# Patient Record
Sex: MALE | Race: WHITE | NOT HISPANIC OR LATINO
[De-identification: names, ages, dates, MRNs, and addresses within clinical notes are randomized per-mention and may not be internally consistent; named-entity substitution may affect disease eponyms.]

---

## 2017-01-26 ENCOUNTER — APPOINTMENT (OUTPATIENT)
Dept: ELECTROPHYSIOLOGY | Facility: CLINIC | Age: 82
End: 2017-01-26

## 2017-02-22 ENCOUNTER — APPOINTMENT (OUTPATIENT)
Dept: ELECTROPHYSIOLOGY | Facility: HOSPITAL | Age: 82
End: 2017-02-22

## 2017-02-22 DIAGNOSIS — I49.5 SICK SINUS SYNDROME: ICD-10-CM

## 2017-09-05 ENCOUNTER — INPATIENT (INPATIENT)
Facility: HOSPITAL | Age: 82
LOS: 14 days | Discharge: OTHER INSTITUTION | DRG: 199 | End: 2017-09-20
Attending: SURGERY | Admitting: SURGERY
Payer: MEDICARE

## 2017-09-05 VITALS
DIASTOLIC BLOOD PRESSURE: 68 MMHG | HEART RATE: 58 BPM | SYSTOLIC BLOOD PRESSURE: 133 MMHG | RESPIRATION RATE: 20 BRPM | WEIGHT: 169.98 LBS | TEMPERATURE: 99 F | OXYGEN SATURATION: 95 %

## 2017-09-05 DIAGNOSIS — J93.9 PNEUMOTHORAX, UNSPECIFIED: ICD-10-CM

## 2017-09-05 LAB
ALBUMIN SERPL ELPH-MCNC: 4.2 G/DL — SIGNIFICANT CHANGE UP (ref 3.3–5)
ALP SERPL-CCNC: 132 U/L — HIGH (ref 40–120)
ALT FLD-CCNC: 28 U/L RC — SIGNIFICANT CHANGE UP (ref 10–45)
ANION GAP SERPL CALC-SCNC: 13 MMOL/L — SIGNIFICANT CHANGE UP (ref 5–17)
APTT BLD: 32.1 SEC — SIGNIFICANT CHANGE UP (ref 27.5–37.4)
AST SERPL-CCNC: 39 U/L — SIGNIFICANT CHANGE UP (ref 10–40)
BASE EXCESS BLDV CALC-SCNC: 2.3 MMOL/L — HIGH (ref -2–2)
BASOPHILS # BLD AUTO: 0 K/UL — SIGNIFICANT CHANGE UP (ref 0–0.2)
BASOPHILS NFR BLD AUTO: 0.1 % — SIGNIFICANT CHANGE UP (ref 0–2)
BILIRUB SERPL-MCNC: 0.6 MG/DL — SIGNIFICANT CHANGE UP (ref 0.2–1.2)
BLD GP AB SCN SERPL QL: NEGATIVE — SIGNIFICANT CHANGE UP
BUN SERPL-MCNC: 23 MG/DL — SIGNIFICANT CHANGE UP (ref 7–23)
CA-I SERPL-SCNC: 1.29 MMOL/L — SIGNIFICANT CHANGE UP (ref 1.12–1.3)
CALCIUM SERPL-MCNC: 10.4 MG/DL — SIGNIFICANT CHANGE UP (ref 8.4–10.5)
CHLORIDE BLDV-SCNC: 105 MMOL/L — SIGNIFICANT CHANGE UP (ref 96–108)
CHLORIDE SERPL-SCNC: 100 MMOL/L — SIGNIFICANT CHANGE UP (ref 96–108)
CO2 BLDV-SCNC: 30 MMOL/L — SIGNIFICANT CHANGE UP (ref 22–30)
CO2 SERPL-SCNC: 27 MMOL/L — SIGNIFICANT CHANGE UP (ref 22–31)
CREAT SERPL-MCNC: 1.17 MG/DL — SIGNIFICANT CHANGE UP (ref 0.5–1.3)
EOSINOPHIL # BLD AUTO: 0.1 K/UL — SIGNIFICANT CHANGE UP (ref 0–0.5)
EOSINOPHIL NFR BLD AUTO: 1.3 % — SIGNIFICANT CHANGE UP (ref 0–6)
GAS PNL BLDV: 136 MMOL/L — SIGNIFICANT CHANGE UP (ref 136–145)
GAS PNL BLDV: SIGNIFICANT CHANGE UP
GLUCOSE BLDV-MCNC: 107 MG/DL — HIGH (ref 70–99)
GLUCOSE SERPL-MCNC: 109 MG/DL — HIGH (ref 70–99)
HCO3 BLDV-SCNC: 29 MMOL/L — SIGNIFICANT CHANGE UP (ref 21–29)
HCT VFR BLD CALC: 42.5 % — SIGNIFICANT CHANGE UP (ref 39–50)
HCT VFR BLDA CALC: 43 % — SIGNIFICANT CHANGE UP (ref 39–50)
HGB BLD CALC-MCNC: 14 G/DL — SIGNIFICANT CHANGE UP (ref 13–17)
HGB BLD-MCNC: 14 G/DL — SIGNIFICANT CHANGE UP (ref 13–17)
INR BLD: 1.04 RATIO — SIGNIFICANT CHANGE UP (ref 0.88–1.16)
LACTATE BLDV-MCNC: 2.6 MMOL/L — HIGH (ref 0.7–2)
LYMPHOCYTES # BLD AUTO: 1.6 K/UL — SIGNIFICANT CHANGE UP (ref 1–3.3)
LYMPHOCYTES # BLD AUTO: 14.7 % — SIGNIFICANT CHANGE UP (ref 13–44)
MCHC RBC-ENTMCNC: 32.8 GM/DL — SIGNIFICANT CHANGE UP (ref 32–36)
MCHC RBC-ENTMCNC: 33.1 PG — SIGNIFICANT CHANGE UP (ref 27–34)
MCV RBC AUTO: 101 FL — HIGH (ref 80–100)
MONOCYTES # BLD AUTO: 1.2 K/UL — HIGH (ref 0–0.9)
MONOCYTES NFR BLD AUTO: 10.9 % — SIGNIFICANT CHANGE UP (ref 2–14)
NEUTROPHILS # BLD AUTO: 7.8 K/UL — HIGH (ref 1.8–7.4)
NEUTROPHILS NFR BLD AUTO: 73.1 % — SIGNIFICANT CHANGE UP (ref 43–77)
PCO2 BLDV: 55 MMHG — HIGH (ref 35–50)
PH BLDV: 7.34 — LOW (ref 7.35–7.45)
PLATELET # BLD AUTO: 257 K/UL — SIGNIFICANT CHANGE UP (ref 150–400)
PO2 BLDV: 37 MMHG — SIGNIFICANT CHANGE UP (ref 25–45)
POTASSIUM BLDV-SCNC: 3.9 MMOL/L — SIGNIFICANT CHANGE UP (ref 3.5–5)
POTASSIUM SERPL-MCNC: 4.2 MMOL/L — SIGNIFICANT CHANGE UP (ref 3.5–5.3)
POTASSIUM SERPL-SCNC: 4.2 MMOL/L — SIGNIFICANT CHANGE UP (ref 3.5–5.3)
PROT SERPL-MCNC: 7.5 G/DL — SIGNIFICANT CHANGE UP (ref 6–8.3)
PROTHROM AB SERPL-ACNC: 11.2 SEC — SIGNIFICANT CHANGE UP (ref 9.8–12.7)
RBC # BLD: 4.21 M/UL — SIGNIFICANT CHANGE UP (ref 4.2–5.8)
RBC # FLD: 11.7 % — SIGNIFICANT CHANGE UP (ref 10.3–14.5)
RH IG SCN BLD-IMP: POSITIVE — SIGNIFICANT CHANGE UP
SAO2 % BLDV: 63 % — LOW (ref 67–88)
SODIUM SERPL-SCNC: 140 MMOL/L — SIGNIFICANT CHANGE UP (ref 135–145)
WBC # BLD: 10.7 K/UL — HIGH (ref 3.8–10.5)
WBC # FLD AUTO: 10.7 K/UL — HIGH (ref 3.8–10.5)

## 2017-09-05 PROCEDURE — 72125 CT NECK SPINE W/O DYE: CPT | Mod: 26

## 2017-09-05 PROCEDURE — 99223 1ST HOSP IP/OBS HIGH 75: CPT | Mod: GC

## 2017-09-05 PROCEDURE — 99285 EMERGENCY DEPT VISIT HI MDM: CPT | Mod: 25,GC

## 2017-09-05 PROCEDURE — 71260 CT THORAX DX C+: CPT | Mod: 26

## 2017-09-05 PROCEDURE — 70450 CT HEAD/BRAIN W/O DYE: CPT | Mod: 26

## 2017-09-05 PROCEDURE — 71010: CPT | Mod: 26

## 2017-09-05 PROCEDURE — 93010 ELECTROCARDIOGRAM REPORT: CPT

## 2017-09-05 PROCEDURE — 99223 1ST HOSP IP/OBS HIGH 75: CPT

## 2017-09-05 PROCEDURE — 74177 CT ABD & PELVIS W/CONTRAST: CPT | Mod: 26

## 2017-09-05 RX ORDER — LATANOPROST 0.05 MG/ML
1 SOLUTION/ DROPS OPHTHALMIC; TOPICAL AT BEDTIME
Qty: 0 | Refills: 0 | Status: DISCONTINUED | OUTPATIENT
Start: 2017-09-05 | End: 2017-09-20

## 2017-09-05 RX ORDER — ENOXAPARIN SODIUM 100 MG/ML
40 INJECTION SUBCUTANEOUS EVERY 24 HOURS
Qty: 0 | Refills: 0 | Status: DISCONTINUED | OUTPATIENT
Start: 2017-09-05 | End: 2017-09-15

## 2017-09-05 RX ORDER — ACETAMINOPHEN 500 MG
1000 TABLET ORAL ONCE
Qty: 0 | Refills: 0 | Status: COMPLETED | OUTPATIENT
Start: 2017-09-05 | End: 2017-09-05

## 2017-09-05 RX ORDER — HALOPERIDOL DECANOATE 100 MG/ML
2.5 INJECTION INTRAMUSCULAR ONCE
Qty: 0 | Refills: 0 | Status: COMPLETED | OUTPATIENT
Start: 2017-09-05 | End: 2017-09-05

## 2017-09-05 RX ORDER — HYDROMORPHONE HYDROCHLORIDE 2 MG/ML
0.5 INJECTION INTRAMUSCULAR; INTRAVENOUS; SUBCUTANEOUS ONCE
Qty: 0 | Refills: 0 | Status: DISCONTINUED | OUTPATIENT
Start: 2017-09-05 | End: 2017-09-05

## 2017-09-05 RX ORDER — CITALOPRAM 10 MG/1
10 TABLET, FILM COATED ORAL DAILY
Qty: 0 | Refills: 0 | Status: DISCONTINUED | OUTPATIENT
Start: 2017-09-05 | End: 2017-09-17

## 2017-09-05 RX ORDER — HALOPERIDOL DECANOATE 100 MG/ML
5 INJECTION INTRAMUSCULAR ONCE
Qty: 0 | Refills: 0 | Status: COMPLETED | OUTPATIENT
Start: 2017-09-05 | End: 2017-09-05

## 2017-09-05 RX ORDER — LIDOCAINE 4 G/100G
1 CREAM TOPICAL EVERY 24 HOURS
Qty: 0 | Refills: 0 | Status: DISCONTINUED | OUTPATIENT
Start: 2017-09-05 | End: 2017-09-20

## 2017-09-05 RX ORDER — FENTANYL CITRATE 50 UG/ML
50 INJECTION INTRAVENOUS ONCE
Qty: 0 | Refills: 0 | Status: DISCONTINUED | OUTPATIENT
Start: 2017-09-05 | End: 2017-09-05

## 2017-09-05 RX ORDER — ACETAMINOPHEN 500 MG
1000 TABLET ORAL ONCE
Qty: 0 | Refills: 0 | Status: COMPLETED | OUTPATIENT
Start: 2017-09-05 | End: 2017-09-06

## 2017-09-05 RX ORDER — ACETAMINOPHEN 500 MG
1000 TABLET ORAL ONCE
Qty: 0 | Refills: 0 | Status: COMPLETED | OUTPATIENT
Start: 2017-09-06 | End: 2017-09-06

## 2017-09-05 RX ORDER — DONEPEZIL HYDROCHLORIDE 10 MG/1
10 TABLET, FILM COATED ORAL AT BEDTIME
Qty: 0 | Refills: 0 | Status: DISCONTINUED | OUTPATIENT
Start: 2017-09-05 | End: 2017-09-20

## 2017-09-05 RX ORDER — LIDOCAINE 4 G/100G
1 CREAM TOPICAL ONCE
Qty: 0 | Refills: 0 | Status: COMPLETED | OUTPATIENT
Start: 2017-09-05 | End: 2017-09-05

## 2017-09-05 RX ORDER — SODIUM CHLORIDE 9 MG/ML
1000 INJECTION, SOLUTION INTRAVENOUS
Qty: 0 | Refills: 0 | Status: DISCONTINUED | OUTPATIENT
Start: 2017-09-05 | End: 2017-09-06

## 2017-09-05 RX ORDER — HYDROMORPHONE HYDROCHLORIDE 2 MG/ML
0.25 INJECTION INTRAMUSCULAR; INTRAVENOUS; SUBCUTANEOUS
Qty: 0 | Refills: 0 | Status: DISCONTINUED | OUTPATIENT
Start: 2017-09-05 | End: 2017-09-07

## 2017-09-05 RX ORDER — MEMANTINE HYDROCHLORIDE 10 MG/1
10 TABLET ORAL EVERY 12 HOURS
Qty: 0 | Refills: 0 | Status: DISCONTINUED | OUTPATIENT
Start: 2017-09-05 | End: 2017-09-11

## 2017-09-05 RX ORDER — LEVOTHYROXINE SODIUM 125 MCG
50 TABLET ORAL DAILY
Qty: 0 | Refills: 0 | Status: DISCONTINUED | OUTPATIENT
Start: 2017-09-05 | End: 2017-09-20

## 2017-09-05 RX ORDER — HYDRALAZINE HCL 50 MG
10 TABLET ORAL ONCE
Qty: 0 | Refills: 0 | Status: COMPLETED | OUTPATIENT
Start: 2017-09-05 | End: 2017-09-05

## 2017-09-05 RX ORDER — DEXMEDETOMIDINE HYDROCHLORIDE IN 0.9% SODIUM CHLORIDE 4 UG/ML
0.4 INJECTION INTRAVENOUS
Qty: 200 | Refills: 0 | Status: DISCONTINUED | OUTPATIENT
Start: 2017-09-05 | End: 2017-09-06

## 2017-09-05 RX ADMIN — HYDROMORPHONE HYDROCHLORIDE 0.5 MILLIGRAM(S): 2 INJECTION INTRAMUSCULAR; INTRAVENOUS; SUBCUTANEOUS at 14:12

## 2017-09-05 RX ADMIN — HYDROMORPHONE HYDROCHLORIDE 0.25 MILLIGRAM(S): 2 INJECTION INTRAMUSCULAR; INTRAVENOUS; SUBCUTANEOUS at 20:55

## 2017-09-05 RX ADMIN — Medication 400 MILLIGRAM(S): at 20:25

## 2017-09-05 RX ADMIN — SODIUM CHLORIDE 75 MILLILITER(S): 9 INJECTION, SOLUTION INTRAVENOUS at 15:57

## 2017-09-05 RX ADMIN — FENTANYL CITRATE 50 MICROGRAM(S): 50 INJECTION INTRAVENOUS at 13:29

## 2017-09-05 RX ADMIN — Medication 400 MILLIGRAM(S): at 12:06

## 2017-09-05 RX ADMIN — HYDROMORPHONE HYDROCHLORIDE 0.25 MILLIGRAM(S): 2 INJECTION INTRAMUSCULAR; INTRAVENOUS; SUBCUTANEOUS at 21:20

## 2017-09-05 RX ADMIN — LIDOCAINE 1 PATCH: 4 CREAM TOPICAL at 01:00

## 2017-09-05 RX ADMIN — Medication 1000 MILLIGRAM(S): at 12:40

## 2017-09-05 RX ADMIN — Medication 1000 MILLIGRAM(S): at 20:55

## 2017-09-05 RX ADMIN — HALOPERIDOL DECANOATE 5 MILLIGRAM(S): 100 INJECTION INTRAMUSCULAR at 20:15

## 2017-09-05 RX ADMIN — MEMANTINE HYDROCHLORIDE 10 MILLIGRAM(S): 10 TABLET ORAL at 18:52

## 2017-09-05 RX ADMIN — LIDOCAINE 1 PATCH: 4 CREAM TOPICAL at 13:37

## 2017-09-05 RX ADMIN — Medication 10 MILLIGRAM(S): at 22:15

## 2017-09-05 RX ADMIN — HALOPERIDOL DECANOATE 5 MILLIGRAM(S): 100 INJECTION INTRAMUSCULAR at 14:30

## 2017-09-05 RX ADMIN — HALOPERIDOL DECANOATE 2.5 MILLIGRAM(S): 100 INJECTION INTRAMUSCULAR at 16:25

## 2017-09-05 RX ADMIN — CITALOPRAM 10 MILLIGRAM(S): 10 TABLET, FILM COATED ORAL at 18:52

## 2017-09-05 NOTE — H&P ADULT - NSHPPHYSICALEXAM_GEN_ALL_CORE
Gen: awake and alert, pleasantly demented  Neuro: EOMI, CN grossly intact, no focal deficits  HEENT: moist mucous membranes, NCAT  Chest: respirations unlabored, mildly tender to palpation on R side, no ecchymosis  abd: soft, nontender, nondistended  Ext: warm, well perfused Gen: awake and alert, pleasantly demented  Neuro: EOMI, CN grossly intact, no focal deficits  HEENT: moist mucous membranes, NCAT  Chest: respirations unlabored, mildly tender to palpation on R side, no ecchymosis  abd: soft, nontender, nondistended  Skin: no rashes or lesions  Ext: warm, well perfused  Psych: confused, otherwise normal mood/affect

## 2017-09-05 NOTE — H&P ADULT - NSHPLABSRESULTS_GEN_ALL_CORE
14.0   10.7  )-----------( 257      ( 05 Sep 2017 11:59 )             42.5     09-05    140  |  100  |  23  ----------------------------<  109<H>  4.2   |  27  |  1.17    Ca    10.4      05 Sep 2017 11:59    TPro  7.5  /  Alb  4.2  /  TBili  0.6  /  DBili  x   /  AST  39  /  ALT  28  /  AlkPhos  132<H>  09-05    < from: CT Cervical Spine No Cont (09.05.17 @ 14:02) >    Brain CT: No acute hemorrhage or major distribution infarct. Diffuse mottling of the left parietal calvarium may be neoplastic in origin. Comparison with prior studies if available is recommended. A follow-up nuclear medicine bone scan can be obtained for further evaluation.    Cervical spine CT: No acute fracture or traumatic subluxation. Multilevel degenerative changes. Right apical pneumothorax. Please see chest CT scan of the same day for further evaluation    < end of copied text >    < from: CT Abdomen and Pelvis w/ IV Cont (09.05.17 @ 14:01) >    1.  Acute mildly displaced  fractures of the right fifth through eighth ribs.  2.  Interval placement of a right-sided pigtail catheter with reexpansion of the right lung with a residual trace pneumothorax.  3.  No evidence of aortic or visceral organ injury.  4.  Mild diffuse bladder wall thickening which may be secondary to underdistention. Correlate with urinalysis to exclude infection.  5.  Enlarged prostate.    < end of copied text >

## 2017-09-05 NOTE — ED PROVIDER NOTE - ATTENDING CONTRIBUTION TO CARE
Attending [employer].  Agree with above.  Pt is an 88 yr old male about whom I received call from Dr. Mcarthur re: pt Teofilo Goldberg Pt seen at [facility] after he fell last night and found to have 5 broken ribs and subq air.  Concern for pneumothorax.  Pt alert and pleasantly demented on arrival.  Pt reportedly slipped and fell in the bathroom unwitnessed by wife.  Denies hitting head but severely demented.  No blood thinners.  Pt has palpable deformity to R anterior chest.  No resp distress or paradoxic CW motion.  Palpable crepitus to R latera chest wall.  No abdominal TTP though wife states he endorsed R abdominal pain last night.  Surgery consult called on arrival after review of outpt CXR.  Imaging broadened, pt moved to trauma capable room for anticipated chest tube.  Surgery recommending chest tube prior to further imaging.

## 2017-09-05 NOTE — ED ADULT NURSE REASSESSMENT NOTE - NS ED NURSE REASSESS COMMENT FT1
chest tube placed to RUQ, to low wall suction, drained 10ml of blood, c/o of pain to chest tube site will notify MD for pain meds

## 2017-09-05 NOTE — ED ADULT NURSE NOTE - PSH
Artificial cardiac pacemaker    H/O carotid endarterectomy  right  H/O shoulder surgery  left bypass  S/P hernia repair    S/P knee replacement  B/L  Stented coronary artery

## 2017-09-05 NOTE — H&P ADULT - ASSESSMENT
88M s/p fall with multiple rib fractures and ptx,  pigtail catheter placed in ED by trauma attending/team.    -CT chest/abd/pelvis  -pain control  -continue pigtail  -SICU for monitoring    pt seen and examined with attending, Dr. Epstein.

## 2017-09-05 NOTE — H&P ADULT - HISTORY OF PRESENT ILLNESS
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  Pt was taking a shower yesterday at 5pm and was found down - water was off and pt started shower at 430pm.  Son helped him up when he fell again onto his rear.  He was complaining of R sided chest pain and his family gave him aspirin and advil and ice packs.  He was still having pain this am so they brought him to an urgent care center where they did a CXT showing rib fractures and ptx and was sent to ED.    In ED, pt was breathing comfortably on room air, pleasantly demented.  Wife, son and daughter in law at bedside. 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  Pt was taking a shower yesterday at 5pm and was found down - water was off and pt started shower at 430pm.  Son helped him up when he fell again onto his rear.  He was complaining of R sided chest pain and his family gave him aspirin and advil and ice packs.  He was still having pain this am so they brought him to an urgent care center where they did a CXR showing rib fractures and ptx and was sent to ED.    In ED, pt was breathing comfortably on room air, pleasantly demented.  Wife, son and daughter in law at bedside.

## 2017-09-05 NOTE — ED ADULT NURSE NOTE - OBJECTIVE STATEMENT
89 y/o M pt w/ pmh of HTN, CAD, hypothyroid, dementia, pacemaker, present to ED sent from right side rib pain, rib fx times 5 and pneumothorax, pt slipped on fall out the shower, - LOC, denies hitting head, fell onto right side, on exam VSS, + pain and tenderness to right side, 89 y/o M pt w/ pmh of HTN, CAD, hypothyroid, dementia, pacemaker, present to ED sent from right side rib pain, rib fx times 5 and pneumothorax, pt slipped on fall out the shower, - LOC, denies hitting head, fell onto right side, on exam VSS, + pain and tenderness to right side, sat 97% on room, labs drawn and sent, MD at bedside, diminish lungs sound to right lung on auscultation, subcutaneous emphysema,

## 2017-09-05 NOTE — H&P ADULT - ATTENDING COMMENTS
Multiple R rib fractures  - multimodal pain control  - given age, multiple fractures, dementia will admit to SICU for close resp monitoring and pulm toilet    R traumatic pneumothorax  - chest tube placed in ED. keep to suction    Dementia, confused and pulling at tubes  - at risk for delirium, will minimize sedating drugs  - will likely need 1:1 to prevent dislodgement of chest tube

## 2017-09-05 NOTE — ED PROVIDER NOTE - PROGRESS NOTE DETAILS
Chest tube placed by Dr. Epstein, patient reports mild pain, will redose pain medicine, discussed with patient and family plan for CT scans and then to ICU for further care. On way upstairs patient became agitated, trying to get out of bed, family at beside, unable to deescalate verbally, 5 of haldol given, patient pulled out 3 way stop cock which was reattached, new Tegaderm applied, chest tube still in appropriate position. SICU resident at beside to take patient upstairs. [employer]:  PT's traumatic injuries appear to be limited to R chest wall.  Chest tube placed by trauma.  Pt admitted to trauma.  Given dementia and delirium he has required 5 mg haldol for pt and staff safety and to enable pt to keep chest tube in place for tx.  Deescalation attempted repeatedly with verbal and staff and family unable to sufficiently deescalate verbally.  Following admission to trauma pt's CT head read with concern for possible malignancy 2/2 changes in parietal bone.  Surgery resident called to update and to discuss with family/advise trauma team.  Unknown if 2/2 known malignancy or new finding.

## 2017-09-05 NOTE — H&P ADULT - NSHPSOCIALHISTORY_GEN_ALL_CORE
Lives at home with wife, son and daughter in law.  Former smoker, family does not allow alcohol any more.

## 2017-09-05 NOTE — H&P ADULT - NSHPREVIEWOFSYSTEMS_GEN_ALL_CORE
Mild cough for about 2 weeks, upset stomach for about 2 days. Otherwise negative except where noted in HPI. Mild cough for about 2 weeks, upset stomach for about 2 days.

## 2017-09-05 NOTE — ED ADULT NURSE NOTE - PMH
CAD (coronary artery disease)    Carotid artery disease    Dementia    Glaucoma    HTN (hypertension)    Hypothyroid    Parotid neoplasm    Skin cancer

## 2017-09-05 NOTE — CONSULT NOTE ADULT - ASSESSMENT
88yM s/p fall w/ R 5-8 rib fractures and PTX    Neuro: Dementia, likely vascular in etiology  - pain control w/ scheduled IV Tylenol and Dilaudid PRN  - c/w home donepezil and memantine  - c/w citalopram for depression  - Precedex gtt for sedation and one to one observation as Pts family states he can become agitated and confused    Resp: R 5-8 rib fractures R PTX, s/p R pigtail placement  - pigtail catheter for decompression of R PTX  - IS, OOB, ambulate  - monitor O2 sats  - f/u daily CXR    CV: h/o HTN, CAD s/p stent and PPM  - holding home metoprolol as HR is 50  - monitor VS q1hr    GI:  - Regular diet    :  - monitor I/Os  - replete lytes PRN    ID:  - no active infectious processes at this time  - monitor WBC w/ daily CBC  - monitor for fevers  - early ambulation and IS use to prevent atelectasis and subsequent pneumonia    Endo:  - no active concerns    Heme:  - monitor CBC  - lovenox for DVT ppx    Dispo: continue w/ SICU care

## 2017-09-05 NOTE — ED PROVIDER NOTE - OBJECTIVE STATEMENT
88 year old male past medical history Coronary Artery Disease, dementia, HTN, hypothyroid, pacemaker, who presents to the ED for right thoracic pain. Patient had fall last night while taking shower, unwitnessed, was on floor for about 30 minutes. Went to urgent care and was found to have rib fractures and pneumothorax. Reports mild right thoracic pain, worse with deep inspiration and cough. No shortness of breath now. Denies hitting head, LOC, neck pain. As per wife acting usual self. No nausea, vomiting, dizziness, lightheadedness. No fevers or chills.     primary medical doctor: Dr. Omar Tariq

## 2017-09-05 NOTE — ED PROVIDER NOTE - MEDICAL DECISION MAKING DETAILS
88 year old male past medical history Coronary Artery Disease, dementia, HTN, hypothyroid, pacemaker, who presents to the ED for right thoracic pain. Patient with multiple rib fractures and pneumothorax, STAT portable called to confirm. Patient stable. Will obtain labwork, trauma Cts, pain control. Will need chest tube. Trauma consult. Will likely need SICU.

## 2017-09-06 LAB
ANION GAP SERPL CALC-SCNC: 11 MMOL/L — SIGNIFICANT CHANGE UP (ref 5–17)
BUN SERPL-MCNC: 19 MG/DL — SIGNIFICANT CHANGE UP (ref 7–23)
CALCIUM SERPL-MCNC: 9.1 MG/DL — SIGNIFICANT CHANGE UP (ref 8.4–10.5)
CHLORIDE SERPL-SCNC: 100 MMOL/L — SIGNIFICANT CHANGE UP (ref 96–108)
CO2 SERPL-SCNC: 25 MMOL/L — SIGNIFICANT CHANGE UP (ref 22–31)
CREAT SERPL-MCNC: 0.87 MG/DL — SIGNIFICANT CHANGE UP (ref 0.5–1.3)
GAS PNL BLDA: SIGNIFICANT CHANGE UP
GAS PNL BLDA: SIGNIFICANT CHANGE UP
GLUCOSE SERPL-MCNC: 138 MG/DL — HIGH (ref 70–99)
HCT VFR BLD CALC: 35.2 % — LOW (ref 39–50)
HCT VFR BLD CALC: 36.6 % — LOW (ref 39–50)
HGB BLD-MCNC: 12.1 G/DL — LOW (ref 13–17)
HGB BLD-MCNC: 12.8 G/DL — LOW (ref 13–17)
MAGNESIUM SERPL-MCNC: 1.9 MG/DL — SIGNIFICANT CHANGE UP (ref 1.6–2.6)
MCHC RBC-ENTMCNC: 34.3 PG — HIGH (ref 27–34)
MCHC RBC-ENTMCNC: 34.4 GM/DL — SIGNIFICANT CHANGE UP (ref 32–36)
MCHC RBC-ENTMCNC: 34.7 PG — HIGH (ref 27–34)
MCHC RBC-ENTMCNC: 34.9 GM/DL — SIGNIFICANT CHANGE UP (ref 32–36)
MCV RBC AUTO: 99.5 FL — SIGNIFICANT CHANGE UP (ref 80–100)
MCV RBC AUTO: 99.9 FL — SIGNIFICANT CHANGE UP (ref 80–100)
PHOSPHATE SERPL-MCNC: 2.6 MG/DL — SIGNIFICANT CHANGE UP (ref 2.5–4.5)
PLATELET # BLD AUTO: 163 K/UL — SIGNIFICANT CHANGE UP (ref 150–400)
PLATELET # BLD AUTO: 180 K/UL — SIGNIFICANT CHANGE UP (ref 150–400)
POTASSIUM SERPL-MCNC: 4.1 MMOL/L — SIGNIFICANT CHANGE UP (ref 3.5–5.3)
POTASSIUM SERPL-SCNC: 4.1 MMOL/L — SIGNIFICANT CHANGE UP (ref 3.5–5.3)
RBC # BLD: 3.53 M/UL — LOW (ref 4.2–5.8)
RBC # BLD: 3.68 M/UL — LOW (ref 4.2–5.8)
RBC # FLD: 11.5 % — SIGNIFICANT CHANGE UP (ref 10.3–14.5)
RBC # FLD: 11.6 % — SIGNIFICANT CHANGE UP (ref 10.3–14.5)
SODIUM SERPL-SCNC: 136 MMOL/L — SIGNIFICANT CHANGE UP (ref 135–145)
WBC # BLD: 10 K/UL — SIGNIFICANT CHANGE UP (ref 3.8–10.5)
WBC # BLD: 7.6 K/UL — SIGNIFICANT CHANGE UP (ref 3.8–10.5)
WBC # FLD AUTO: 10 K/UL — SIGNIFICANT CHANGE UP (ref 3.8–10.5)
WBC # FLD AUTO: 7.6 K/UL — SIGNIFICANT CHANGE UP (ref 3.8–10.5)

## 2017-09-06 PROCEDURE — 93010 ELECTROCARDIOGRAM REPORT: CPT

## 2017-09-06 PROCEDURE — 99233 SBSQ HOSP IP/OBS HIGH 50: CPT | Mod: GC

## 2017-09-06 PROCEDURE — 71010: CPT | Mod: 26,76

## 2017-09-06 RX ORDER — DOCUSATE SODIUM 100 MG
100 CAPSULE ORAL THREE TIMES A DAY
Qty: 0 | Refills: 0 | Status: DISCONTINUED | OUTPATIENT
Start: 2017-09-06 | End: 2017-09-15

## 2017-09-06 RX ORDER — SENNA PLUS 8.6 MG/1
2 TABLET ORAL AT BEDTIME
Qty: 0 | Refills: 0 | Status: DISCONTINUED | OUTPATIENT
Start: 2017-09-06 | End: 2017-09-15

## 2017-09-06 RX ORDER — ATROPA BELLADONNA AND OPIUM 16.2; 6 MG/1; MG/1
1 SUPPOSITORY RECTAL ONCE
Qty: 0 | Refills: 0 | Status: DISCONTINUED | OUTPATIENT
Start: 2017-09-06 | End: 2017-09-07

## 2017-09-06 RX ORDER — TAMSULOSIN HYDROCHLORIDE 0.4 MG/1
0.4 CAPSULE ORAL AT BEDTIME
Qty: 0 | Refills: 0 | Status: DISCONTINUED | OUTPATIENT
Start: 2017-09-06 | End: 2017-09-20

## 2017-09-06 RX ORDER — HALOPERIDOL DECANOATE 100 MG/ML
5 INJECTION INTRAMUSCULAR ONCE
Qty: 0 | Refills: 0 | Status: DISCONTINUED | OUTPATIENT
Start: 2017-09-06 | End: 2017-09-07

## 2017-09-06 RX ORDER — HYDRALAZINE HCL 50 MG
20 TABLET ORAL ONCE
Qty: 0 | Refills: 0 | Status: COMPLETED | OUTPATIENT
Start: 2017-09-06 | End: 2017-09-06

## 2017-09-06 RX ORDER — QUETIAPINE FUMARATE 200 MG/1
25 TABLET, FILM COATED ORAL AT BEDTIME
Qty: 0 | Refills: 0 | Status: DISCONTINUED | OUTPATIENT
Start: 2017-09-06 | End: 2017-09-08

## 2017-09-06 RX ORDER — LIDOCAINE 4 G/100G
1 CREAM TOPICAL THREE TIMES A DAY
Qty: 0 | Refills: 0 | Status: DISCONTINUED | OUTPATIENT
Start: 2017-09-06 | End: 2017-09-10

## 2017-09-06 RX ORDER — DEXTROSE MONOHYDRATE, SODIUM CHLORIDE, AND POTASSIUM CHLORIDE 50; .745; 4.5 G/1000ML; G/1000ML; G/1000ML
1000 INJECTION, SOLUTION INTRAVENOUS
Qty: 0 | Refills: 0 | Status: DISCONTINUED | OUTPATIENT
Start: 2017-09-06 | End: 2017-09-07

## 2017-09-06 RX ORDER — HYDRALAZINE HCL 50 MG
10 TABLET ORAL EVERY 4 HOURS
Qty: 0 | Refills: 0 | Status: DISCONTINUED | OUTPATIENT
Start: 2017-09-06 | End: 2017-09-20

## 2017-09-06 RX ORDER — PANTOPRAZOLE SODIUM 20 MG/1
40 TABLET, DELAYED RELEASE ORAL DAILY
Qty: 0 | Refills: 0 | Status: DISCONTINUED | OUTPATIENT
Start: 2017-09-06 | End: 2017-09-06

## 2017-09-06 RX ADMIN — Medication 100 MILLIGRAM(S): at 15:18

## 2017-09-06 RX ADMIN — LATANOPROST 1 DROP(S): 0.05 SOLUTION/ DROPS OPHTHALMIC; TOPICAL at 22:24

## 2017-09-06 RX ADMIN — Medication 1000 MILLIGRAM(S): at 04:30

## 2017-09-06 RX ADMIN — Medication 10 MILLIGRAM(S): at 22:12

## 2017-09-06 RX ADMIN — SODIUM CHLORIDE 75 MILLILITER(S): 9 INJECTION, SOLUTION INTRAVENOUS at 07:00

## 2017-09-06 RX ADMIN — CITALOPRAM 10 MILLIGRAM(S): 10 TABLET, FILM COATED ORAL at 12:43

## 2017-09-06 RX ADMIN — LIDOCAINE 1 APPLICATION(S): 4 CREAM TOPICAL at 22:52

## 2017-09-06 RX ADMIN — Medication 20 MILLIGRAM(S): at 02:54

## 2017-09-06 RX ADMIN — Medication 400 MILLIGRAM(S): at 09:00

## 2017-09-06 RX ADMIN — SENNA PLUS 2 TABLET(S): 8.6 TABLET ORAL at 22:15

## 2017-09-06 RX ADMIN — TAMSULOSIN HYDROCHLORIDE 0.4 MILLIGRAM(S): 0.4 CAPSULE ORAL at 22:15

## 2017-09-06 RX ADMIN — LIDOCAINE 1 PATCH: 4 CREAM TOPICAL at 12:43

## 2017-09-06 RX ADMIN — QUETIAPINE FUMARATE 25 MILLIGRAM(S): 200 TABLET, FILM COATED ORAL at 22:52

## 2017-09-06 RX ADMIN — Medication 400 MILLIGRAM(S): at 04:00

## 2017-09-06 RX ADMIN — DONEPEZIL HYDROCHLORIDE 10 MILLIGRAM(S): 10 TABLET, FILM COATED ORAL at 22:15

## 2017-09-06 RX ADMIN — Medication 10 MILLIGRAM(S): at 08:03

## 2017-09-06 RX ADMIN — Medication 100 MILLIGRAM(S): at 22:15

## 2017-09-06 RX ADMIN — DEXTROSE MONOHYDRATE, SODIUM CHLORIDE, AND POTASSIUM CHLORIDE 50 MILLILITER(S): 50; .745; 4.5 INJECTION, SOLUTION INTRAVENOUS at 22:15

## 2017-09-06 RX ADMIN — Medication 400 MILLIGRAM(S): at 15:58

## 2017-09-06 RX ADMIN — ENOXAPARIN SODIUM 40 MILLIGRAM(S): 100 INJECTION SUBCUTANEOUS at 12:43

## 2017-09-06 RX ADMIN — MEMANTINE HYDROCHLORIDE 10 MILLIGRAM(S): 10 TABLET ORAL at 19:06

## 2017-09-06 RX ADMIN — Medication 1000 MILLIGRAM(S): at 16:15

## 2017-09-06 NOTE — CONSULT NOTE ADULT - ASSESSMENT
87 y/o M w/ hx of HTN HLD CAD s/p PPM, dementia aaox 1-2 at baseline, glaucoma, hypothyroidism and parotid neoplasm who presented s/p fall in the bathtub found to have  R rib 5-8 fractures and pneumothorax s/p chest tube     Rib Fx / pneumonthorax s/p chest tube c/w pain control monitor management as per primary team    Bladder wall thickening - consider UA/UC r/o UTI in setting of fall in elderly demented pt    HTN HLD CAD s/p PPM - bp hr controlled monitor c/w telemetry     Dementia - c/w namenda sedation PRN in effort to prevent further exacerbation of pneumo / rib fx     DVT PPX      case discussed with Outpt PCP Dr Omar Tariq and Pulmonary Dr Vides.  pt daughter counseled extensively

## 2017-09-06 NOTE — PROVIDER CONTACT NOTE (OTHER) - BACKGROUND
Pt admitted s/p fall with R rib fx, pneumothorax. PMH: CAD, dementia, glaucoma, HTN, hypothyroid, parotid neoplasm, skin CA, pacemaker, R carotid endartectomy

## 2017-09-06 NOTE — PROGRESS NOTE ADULT - ASSESSMENT
88yM s/p fall w/ R 5-8 rib fractures and PTX    Neuro: Dementia, likely vascular in etiology  - pain control w/ scheduled IV Tylenol and Dilaudid PRN  - c/w home donepezil and memantine  - c/w citalopram for depression  - Precedex gtt for sedation and one to one observation as Pts family states he can become agitated and confused    Resp: R 5-8 rib fractures R PTX, s/p R pigtail placement  - pigtail catheter for decompression of R PTX  - pigtail to WS t/d  - IS, OOB, ambulate  - monitor O2 sats  - f/u daily CXR    CV: h/o HTN, CAD s/p stent and PPM  - holding home metoprolol as HR is 50  - monitor VS q1hr    GI:  - Regular diet    :  - monitor I/Os  - replete lytes PRN  - Read for urinary retention  - Flomax    ID:  - no active infectious processes at this time  - monitor WBC w/ daily CBC  - monitor for fevers  - early ambulation and IS use to prevent atelectasis and subsequent pneumonia    Endo:  - c/w home synthroid    Heme:  - monitor CBC  - lovenox for DVT ppx    Dispo: continue w/ SICU care

## 2017-09-06 NOTE — PROGRESS NOTE ADULT - SUBJECTIVE AND OBJECTIVE BOX
ACS Progress Note    S: Patient seen and examined. Agitated overnight requiring precedex, which he was on when examined this AM. Patient had urinary retention last night and received a brunner catheter. Hypertensive requiring multiple pushes of hydralazine.    O:  Vital Signs Last 24 Hrs  T(C): 36.6 (06 Sep 2017 11:00), Max: 37.2 (06 Sep 2017 07:00)  T(F): 97.9 (06 Sep 2017 11:00), Max: 98.9 (06 Sep 2017 07:00)  HR: 49 (06 Sep 2017 12:00) (49 - 54)  BP: 97/52 (06 Sep 2017 12:00) (91/52 - 205/86)  BP(mean): 72 (06 Sep 2017 12:00) (69 - 137)  RR: 17 (06 Sep 2017 12:00) (14 - 34)  SpO2: 95% (06 Sep 2017 12:00) (91% - 98%)    I&O's Detail    05 Sep 2017 07:01  -  06 Sep 2017 07:00  --------------------------------------------------------  IN:    dexmedetomidine Infusion: 333.8 mL    IV PiggyBack: 200 mL    lactated ringers.: 1125 mL    Oral Fluid: 150 mL  Total IN: 1808.8 mL    OUT:    Indwelling Catheter - Urethral: 970 mL  Total OUT: 970 mL    Total NET: 838.8 mL    06 Sep 2017 07:01  -  06 Sep 2017 12:55  --------------------------------------------------------  IN:    dexmedetomidine Infusion: 33 mL    lactated ringers.: 375 mL  Total IN: 408 mL    OUT:    Indwelling Catheter - Urethral: 640 mL  Total OUT: 640 mL    Total NET: -232 mL    MEDICATIONS  (STANDING):  lactated ringers. 1000 milliLiter(s) (75 mL/Hr) IV Continuous <Continuous>  acetaminophen  IVPB. 1000 milliGRAM(s) IV Intermittent once  enoxaparin Injectable 40 milliGRAM(s) SubCutaneous every 24 hours  citalopram 10 milliGRAM(s) Oral daily  donepezil 10 milliGRAM(s) Oral at bedtime  latanoprost 0.005% Ophthalmic Solution 1 Drop(s) Both EYES at bedtime  memantine 10 milliGRAM(s) Oral every 12 hours  levothyroxine 50 MICROGram(s) Oral daily  lidocaine   Patch 1 Patch Transdermal every 24 hours  docusate sodium 100 milliGRAM(s) Oral three times a day  senna 2 Tablet(s) Oral at bedtime  tamsulosin 0.4 milliGRAM(s) Oral at bedtime    MEDICATIONS  (PRN):  HYDROmorphone  Injectable 0.25 milliGRAM(s) IV Push every 3 hours PRN Severe Pain (7 - 10)  hydrALAZINE Injectable 10 milliGRAM(s) IV Push every 4 hours PRN Systolic greater than 180                       12.1   7.6   )-----------( 163      ( 06 Sep 2017 05:46 )             35.2     09-06    136  |  100  |  19  ----------------------------<  138<H>  4.1   |  25  |  0.87    Ca    9.1      06 Sep 2017 05:46  Phos  2.6     09-06  Mg     1.9     09-06    TPro  7.5  /  Alb  4.2  /  TBili  0.6  /  DBili  x   /  AST  39  /  ALT  28  /  AlkPhos  132<H>  09-05      Physical Exam:  Gen: Laying in bed, NAD, sedated on precedex  Resp: Unlabored breathing, satting well on RA  Abd: soft, NT, ND    Lines:   IV: patent, in place.

## 2017-09-06 NOTE — PROGRESS NOTE ADULT - ASSESSMENT
87 y/o M w/ hx of CAD s/p PPM, dementia, glaucoma, hypothyroisim and parotid neoplasm who presented with R rib 5-8 fractures and pneumothorax after falling in bathtub. He is s/p R chest PTC placment with improvement of PTX. Agitation/confusion has been an issue for patient, requiring sedation with precedex overnight.    - R chest PTC, to water seal, consider removal if lung remains expanded  - Daily CXR  - Pain control  - Wean sedation as tolerated, take measures to orient patient  - Read for urinary retention  - BP control  - Monitor vitals, UOP  - f/u labs  - DVT ppx  - Care per SICU  - Dispo planning    Antwon Marie MD

## 2017-09-06 NOTE — PROGRESS NOTE ADULT - ATTENDING COMMENTS
I examined this patient on AM rounds with the SICU team.  All relevant studies reviewed.  Agree with above.  I spoke with the patient's daughter at length.  Issues:  Dementia with behavioral disturbance:  alternates between somnolence and agitation (similar to home) taken off precedex this morning.    Acute respiratory failure following traumatic closed fracture of 4 ribs and evacuation of right sided pneumothorax with "pigtail."  No distress today.  No residual pneumothorax on CXR  Known CAD of native coronaries.  Has PPM which we will interrogate today given asymptomatic bradycardia (less than 50) without obvious pacing "."  Overall stable.  d/w Trauma team.  Consider floor transfer tomorrow.

## 2017-09-06 NOTE — CONSULT NOTE ADULT - ASSESSMENT
R pneumothorax with multiple R displaced rib fractures post presumed mechanical fall (though unwitnessed)  Dementia  CAD  HYpertension    REC:    Chest tube currently on water seal: remove CT if lung remains expanded on pending PM cxr  DVT prophylaxis  Pain management  Cardiac monitoring

## 2017-09-06 NOTE — PROVIDER CONTACT NOTE (OTHER) - SITUATION
UO for 0700 0cc, large amount of bloody drainage leaking around brunner catheter. Bladder scan 212cc.

## 2017-09-06 NOTE — PROGRESS NOTE ADULT - SUBJECTIVE AND OBJECTIVE BOX
HISTORY  88y Man w/ PMH sig for HTN, CAD s/p stent, dementia, s/p b/l TKR, L "shoulder surgery", R CEA approximately 2 years ago at [facility], and resection of b/l "neck cancer" approximately 15 years ago. He presented to [facility] ED on the morning of 9/5/17 after slipping and falling getting out of the shower. He was found down after approximately 30 minutes. When his son attempted to help him up, he slipped again, landing on his back/buttocks. In the trauma bay, he was found to have a large right sided pneumothorax. A pigtail catheter was placed in the R anterior chest. A follow up chest CT showed resolution of the R PTX and acute mildly displaced  fractures of the R 4-8 ribs. On bedside exam after transfer to SICU, he denies LOC, head ache, vision changes, nausea, emesis, SoB, CP, abd pain, limb pain, numbness, or tingling.    24 HOUR EVENTS: Pt was agitated and confused over night requiring a precedex gtt and multiple pushes of IV haldol. According to family, he frequently sundowns at baseline. He had not urinated since before admission. A Read was placed w/ return of approximately 900 cc of urine. His agitation improved after the Read was placed. He was also intermittently hypertensive overnight requiring two IVP Hydralazine, 10mg and 20mg, w/ appropriate BP response.    SUBJECTIVE/ROS:  [ ] A ten-point review of systems was otherwise negative except as noted.  [x] Due to altered mental status/intubation, subjective information were not able to be obtained from the patient. History was obtained, to the extent possible, from review of the chart and collateral sources of information.      NEURO  RASS: 0 to +2    GCS:     CAM ICU: Positive  Exam: Sedated, agitated when awakened, oriented to person  Meds: dexmedetomidine Infusion 0.4 MICROgram(s)/kG/Hr IV Continuous <Continuous>  acetaminophen  IVPB. 1000 milliGRAM(s) IV Intermittent once  acetaminophen  IVPB. 1000 milliGRAM(s) IV Intermittent once  HYDROmorphone  Injectable 0.25 milliGRAM(s) IV Push every 3 hours PRN Severe Pain (7 - 10)  citalopram 10 milliGRAM(s) Oral daily  donepezil 10 milliGRAM(s) Oral at bedtime  memantine 10 milliGRAM(s) Oral every 12 hours    [x] Adequacy of sedation and pain control has been assessed and adjusted      RESPIRATORY  RR: 15 (09-06-17 @ 06:00) (14 - 34)  SpO2: 94% (09-06-17 @ 06:00) (91% - 98%)  Wt(kg): --  Exam: unlabored, clear to auscultation bilaterally, R pigtail in place w/ serosang drain, no air leak.  ABG - ( 06 Sep 2017 05:43 )  pH: 7.42  /  pCO2: 40    /  pO2: 69    / HCO3: 26    / Base Excess: 1.6   /  SaO2: 95      Lactate: x          Meds:       CARDIOVASCULAR  HR: 49 (09-06-17 @ 06:00) (49 - 58)  BP: 168/74 (09-06-17 @ 06:00) (112/54 - 205/86)  BP(mean): 107 (09-06-17 @ 06:00) (76 - 137)  ABP: --  ABP(mean): --  Wt(kg): --  CVP(cm H2O): --  VBG - ( 05 Sep 2017 11:59 )  pH: 7.34  /  pCO2: 55    /  pO2: 37    / HCO3: 29    / Base Excess: 2.3   /  SaO2: 63     Lactate: 2.6          Exam: regular rate and rhythm  Cardiac Rhythm: Livan, paced at 50bpm  Perfusion     [x]Adequate   [ ]Inadequate  Mentation   [ ]Normal       [x]Reduced  Extremities  [x]Warm         [ ]Cool  Volume Status [ ]Hypervolemic [x]Euvolemic [ ]Hypovolemic  Meds:       GI/NUTRITION  Exam: soft, nontender, nondistended  Diet: Regular  Meds: docusate sodium 100 milliGRAM(s) Oral three times a day  senna 2 Tablet(s) Oral at bedtime      GENITOURINARY  I&O's Detail    09-05 @ 07:01  -  09-06 @ 06:59  --------------------------------------------------------  IN:    dexmedetomidine Infusion: 306.6 mL    IV PiggyBack: 100 mL    lactated ringers.: 1050 mL    Oral Fluid: 150 mL  Total IN: 1606.6 mL    OUT:    Indwelling Catheter - Urethral: 970 mL  Total OUT: 970 mL    Total NET: 636.6 mL        Weight (kg): 77.7 (09-05 @ 14:45)  09-06    136  |  100  |  19  ----------------------------<  138<H>  4.1   |  25  |  0.87    Ca    9.1      06 Sep 2017 05:46  Phos  2.6     09-06  Mg     1.9     09-06    TPro  7.5  /  Alb  4.2  /  TBili  0.6  /  DBili  x   /  AST  39  /  ALT  28  /  AlkPhos  132<H>  09-05    [x] Read catheter, indication: Urinary retention  Meds: lactated ringers. 1000 milliLiter(s) IV Continuous <Continuous>        HEMATOLOGIC  Meds: enoxaparin Injectable 40 milliGRAM(s) SubCutaneous every 24 hours    [x] VTE Prophylaxis                        12.1   7.6   )-----------( 163      ( 06 Sep 2017 05:46 )             35.2     PT/INR - ( 05 Sep 2017 11:59 )   PT: 11.2 sec;   INR: 1.04 ratio         PTT - ( 05 Sep 2017 11:59 )  PTT:32.1 sec  Transfusion     [ ] PRBC   [ ] Platelets   [ ] FFP   [ ] Cryoprecipitate      INFECTIOUS DISEASES  WBC Count: 7.6 K/uL (09-06 @ 05:46)  WBC Count: 10.7 K/uL (09-05 @ 11:59)    RECENT CULTURES:    Meds:       ENDOCRINE  CAPILLARY BLOOD GLUCOSE        Meds: levothyroxine 50 MICROGram(s) Oral daily        ACCESS DEVICES:  [x] Peripheral IV  [ ] Central Venous Line	[ ] R	[ ] L	[ ] IJ	[ ] Fem	[ ] SC	Placed:   [ ] Arterial Line		[ ] R	[ ] L	[ ] Fem	[ ] Rad	[ ] Ax	Placed:   [ ] PICC:					[ ] Mediport  [x] Urinary Catheter, Date Placed: 9/6  [x] Necessity of urinary, arterial, and venous catheters discussed    OTHER MEDICATIONS:  latanoprost 0.005% Ophthalmic Solution 1 Drop(s) Both EYES at bedtime  lidocaine   Patch 1 Patch Transdermal every 24 hours      CODE STATUS:      IMAGING: HISTORY  88y Man w/ PMH sig for HTN, CAD s/p stent, dementia, s/p b/l TKR, L "shoulder surgery", R CEA approximately 2 years ago at [facility], and resection of b/l "neck cancer" approximately 15 years ago. He presented to [facility] ED on the morning of 9/5/17 after slipping and falling getting out of the shower. He was found down after approximately 30 minutes. When his son attempted to help him up, he slipped again, landing on his back/buttocks. In the trauma bay, he was found to have a large right sided pneumothorax. A pigtail catheter was placed in the R anterior chest. A follow up chest CT showed resolution of the R PTX and acute mildly displaced  fractures of the R 4-8 ribs. On bedside exam after transfer to SICU, he denies LOC, head ache, vision changes, nausea, emesis, SoB, CP, abd pain, limb pain, numbness, or tingling.    24 HOUR EVENTS: Pt was agitated and confused over night requiring a precedex gtt and multiple pushes of IV haldol. According to family, he frequently sundowns at baseline. He had not urinated since before admission. A Read was placed w/ return of approximately 900 cc of urine. His agitation improved after the Read was placed. He was also intermittently hypertensive overnight requiring two IVP Hydralazine, 10mg and 20mg, w/ appropriate BP response.    SUBJECTIVE/ROS:  [ ] A ten-point review of systems was otherwise negative except as noted.  [x] Due to altered mental status/intubation, subjective information were not able to be obtained from the patient. History was obtained, to the extent possible, from review of the chart and collateral sources of information.      NEURO  RASS: 0 to +2    GCS:     CAM ICU: Positive  Exam: Sedated, agitated when awakened, oriented to person  Meds: dexmedetomidine Infusion 0.4 MICROgram(s)/kG/Hr IV Continuous <Continuous>  acetaminophen  IVPB. 1000 milliGRAM(s) IV Intermittent once  acetaminophen  IVPB. 1000 milliGRAM(s) IV Intermittent once  HYDROmorphone  Injectable 0.25 milliGRAM(s) IV Push every 3 hours PRN Severe Pain (7 - 10)  citalopram 10 milliGRAM(s) Oral daily  donepezil 10 milliGRAM(s) Oral at bedtime  memantine 10 milliGRAM(s) Oral every 12 hours    [x] Adequacy of sedation and pain control has been assessed and adjusted      RESPIRATORY  RR: 15 (09-06-17 @ 06:00) (14 - 34)  SpO2: 94% (09-06-17 @ 06:00) (91% - 98%)  Wt(kg): --  Exam: unlabored, clear to auscultation bilaterally, R pigtail in place w/ serosang drain, no air leak.  ABG - ( 06 Sep 2017 05:43 )  pH: 7.42  /  pCO2: 40    /  pO2: 69    / HCO3: 26    / Base Excess: 1.6   /  SaO2: 95      Lactate: x          Meds:       CARDIOVASCULAR  HR: 49 (09-06-17 @ 06:00) (49 - 58)  BP: 168/74 (09-06-17 @ 06:00) (112/54 - 205/86)  BP(mean): 107 (09-06-17 @ 06:00) (76 - 137)  ABP: --  ABP(mean): --  Wt(kg): --  CVP(cm H2O): --  VBG - ( 05 Sep 2017 11:59 )  pH: 7.34  /  pCO2: 55    /  pO2: 37    / HCO3: 29    / Base Excess: 2.3   /  SaO2: 63     Lactate: 2.6          Exam: regular rate and rhythm  Cardiac Rhythm: Livan, paced at 50bpm  Perfusion     [x]Adequate   [ ]Inadequate  Mentation   [ ]Normal       [x]Reduced  Extremities  [x]Warm         [ ]Cool  Volume Status [ ]Hypervolemic [x]Euvolemic [ ]Hypovolemic  Meds:       GI/NUTRITION  Exam: soft, nontender, nondistended  Diet: Regular  Meds: docusate sodium 100 milliGRAM(s) Oral three times a day  senna 2 Tablet(s) Oral at bedtime      GENITOURINARY    I&O's Detail    05 Sep 2017 07:01  -  06 Sep 2017 07:00  --------------------------------------------------------  IN:    dexmedetomidine Infusion: 306.6 mL    IV PiggyBack: 200 mL    lactated ringers.: 1050 mL    Oral Fluid: 150 mL  Total IN: 1706.6 mL    OUT:    Indwelling Catheter - Urethral: 970 mL  Total OUT: 970 mL    Total NET: 736.6 mL        Weight (kg): 77.7 (09-05 @ 14:45)  09-06    136  |  100  |  19  ----------------------------<  138<H>  4.1   |  25  |  0.87    Ca    9.1      06 Sep 2017 05:46  Phos  2.6     09-06  Mg     1.9     09-06    TPro  7.5  /  Alb  4.2  /  TBili  0.6  /  DBili  x   /  AST  39  /  ALT  28  /  AlkPhos  132<H>  09-05    [x] Read catheter, indication: Urinary retention  Meds: lactated ringers. 1000 milliLiter(s) IV Continuous <Continuous>        HEMATOLOGIC  Meds: enoxaparin Injectable 40 milliGRAM(s) SubCutaneous every 24 hours    [x] VTE Prophylaxis                        12.1   7.6   )-----------( 163      ( 06 Sep 2017 05:46 )             35.2     PT/INR - ( 05 Sep 2017 11:59 )   PT: 11.2 sec;   INR: 1.04 ratio         PTT - ( 05 Sep 2017 11:59 )  PTT:32.1 sec  Transfusion     [ ] PRBC   [ ] Platelets   [ ] FFP   [ ] Cryoprecipitate      INFECTIOUS DISEASES  WBC Count: 7.6 K/uL (09-06 @ 05:46)  WBC Count: 10.7 K/uL (09-05 @ 11:59)    RECENT CULTURES:    Meds:       ENDOCRINE  CAPILLARY BLOOD GLUCOSE        Meds: levothyroxine 50 MICROGram(s) Oral daily        ACCESS DEVICES:  [x] Peripheral IV  [ ] Central Venous Line	[ ] R	[ ] L	[ ] IJ	[ ] Fem	[ ] SC	Placed:   [ ] Arterial Line		[ ] R	[ ] L	[ ] Fem	[ ] Rad	[ ] Ax	Placed:   [ ] PICC:					[ ] Mediport  [x] Urinary Catheter, Date Placed: 9/6  [x] Necessity of urinary, arterial, and venous catheters discussed    OTHER MEDICATIONS:  latanoprost 0.005% Ophthalmic Solution 1 Drop(s) Both EYES at bedtime  lidocaine   Patch 1 Patch Transdermal every 24 hours      CODE STATUS:      IMAGING:

## 2017-09-06 NOTE — CONSULT NOTE ADULT - ASSESSMENT
Urinary retention  - cont Flomax  - Check urine culture  - Cont CBI 88M with dementia, recent fall s/p multiple rib fracture and PTX s/p chest tube. Found to have urinary retention, 900 cc, with gross hematuria. Likely 2/2 traumatic brunner.   - cont Flomax  - Check urine culture  - Cont CBI, wean as tolerated 88M with dementia, recent fall s/p multiple rib fracture and PTX s/p chest tube. Found to have urinary retention, 900 cc, with gross hematuria. Likely 2/2 traumatic brunner. Patient also tugging at catheter.     - cont Flomax  - Check urine culture  - Cont CBI, wean as tolerated   - Belladonna/Opium suppositories PRN for bladder spasms/discomfort

## 2017-09-07 LAB
ANION GAP SERPL CALC-SCNC: 12 MMOL/L — SIGNIFICANT CHANGE UP (ref 5–17)
BUN SERPL-MCNC: 21 MG/DL — SIGNIFICANT CHANGE UP (ref 7–23)
CA-I BLD-SCNC: 1.26 MMOL/L — SIGNIFICANT CHANGE UP (ref 1.12–1.3)
CALCIUM SERPL-MCNC: 9.6 MG/DL — SIGNIFICANT CHANGE UP (ref 8.4–10.5)
CHLORIDE SERPL-SCNC: 100 MMOL/L — SIGNIFICANT CHANGE UP (ref 96–108)
CO2 SERPL-SCNC: 25 MMOL/L — SIGNIFICANT CHANGE UP (ref 22–31)
CREAT SERPL-MCNC: 1.06 MG/DL — SIGNIFICANT CHANGE UP (ref 0.5–1.3)
GAS PNL BLDA: SIGNIFICANT CHANGE UP
GAS PNL BLDA: SIGNIFICANT CHANGE UP
GLUCOSE SERPL-MCNC: 136 MG/DL — HIGH (ref 70–99)
HCT VFR BLD CALC: 35.1 % — LOW (ref 39–50)
HGB BLD-MCNC: 12.1 G/DL — LOW (ref 13–17)
MAGNESIUM SERPL-MCNC: 2.8 MG/DL — HIGH (ref 1.6–2.6)
MCHC RBC-ENTMCNC: 34.4 GM/DL — SIGNIFICANT CHANGE UP (ref 32–36)
MCHC RBC-ENTMCNC: 34.5 PG — HIGH (ref 27–34)
MCV RBC AUTO: 100 FL — SIGNIFICANT CHANGE UP (ref 80–100)
PHOSPHATE SERPL-MCNC: 2.9 MG/DL — SIGNIFICANT CHANGE UP (ref 2.5–4.5)
PLATELET # BLD AUTO: 194 K/UL — SIGNIFICANT CHANGE UP (ref 150–400)
POTASSIUM SERPL-MCNC: 4.2 MMOL/L — SIGNIFICANT CHANGE UP (ref 3.5–5.3)
POTASSIUM SERPL-SCNC: 4.2 MMOL/L — SIGNIFICANT CHANGE UP (ref 3.5–5.3)
RBC # BLD: 3.5 M/UL — LOW (ref 4.2–5.8)
RBC # FLD: 11.8 % — SIGNIFICANT CHANGE UP (ref 10.3–14.5)
SODIUM SERPL-SCNC: 137 MMOL/L — SIGNIFICANT CHANGE UP (ref 135–145)
WBC # BLD: 12.7 K/UL — HIGH (ref 3.8–10.5)
WBC # FLD AUTO: 12.7 K/UL — HIGH (ref 3.8–10.5)

## 2017-09-07 PROCEDURE — 99233 SBSQ HOSP IP/OBS HIGH 50: CPT | Mod: GC

## 2017-09-07 PROCEDURE — 71010: CPT | Mod: 26,76

## 2017-09-07 PROCEDURE — 99223 1ST HOSP IP/OBS HIGH 75: CPT

## 2017-09-07 RX ORDER — ACETAMINOPHEN 500 MG
1000 TABLET ORAL ONCE
Qty: 0 | Refills: 0 | Status: COMPLETED | OUTPATIENT
Start: 2017-09-07 | End: 2017-09-07

## 2017-09-07 RX ORDER — MAGNESIUM SULFATE 500 MG/ML
2 VIAL (ML) INJECTION ONCE
Qty: 0 | Refills: 0 | Status: COMPLETED | OUTPATIENT
Start: 2017-09-07 | End: 2017-09-07

## 2017-09-07 RX ORDER — HALOPERIDOL DECANOATE 100 MG/ML
5 INJECTION INTRAMUSCULAR ONCE
Qty: 0 | Refills: 0 | Status: COMPLETED | OUTPATIENT
Start: 2017-09-07 | End: 2017-09-08

## 2017-09-07 RX ORDER — HALOPERIDOL DECANOATE 100 MG/ML
5 INJECTION INTRAMUSCULAR ONCE
Qty: 0 | Refills: 0 | Status: COMPLETED | OUTPATIENT
Start: 2017-09-07 | End: 2017-09-07

## 2017-09-07 RX ADMIN — ATROPA BELLADONNA AND OPIUM 1 SUPPOSITORY(S): 16.2; 6 SUPPOSITORY RECTAL at 02:38

## 2017-09-07 RX ADMIN — QUETIAPINE FUMARATE 25 MILLIGRAM(S): 200 TABLET, FILM COATED ORAL at 21:00

## 2017-09-07 RX ADMIN — LIDOCAINE 1 PATCH: 4 CREAM TOPICAL at 12:53

## 2017-09-07 RX ADMIN — DONEPEZIL HYDROCHLORIDE 10 MILLIGRAM(S): 10 TABLET, FILM COATED ORAL at 21:00

## 2017-09-07 RX ADMIN — Medication 1000 MILLIGRAM(S): at 17:30

## 2017-09-07 RX ADMIN — HALOPERIDOL DECANOATE 5 MILLIGRAM(S): 100 INJECTION INTRAMUSCULAR at 02:38

## 2017-09-07 RX ADMIN — Medication 100 MILLIGRAM(S): at 21:00

## 2017-09-07 RX ADMIN — LATANOPROST 1 DROP(S): 0.05 SOLUTION/ DROPS OPHTHALMIC; TOPICAL at 21:01

## 2017-09-07 RX ADMIN — ENOXAPARIN SODIUM 40 MILLIGRAM(S): 100 INJECTION SUBCUTANEOUS at 12:53

## 2017-09-07 RX ADMIN — DEXTROSE MONOHYDRATE, SODIUM CHLORIDE, AND POTASSIUM CHLORIDE 50 MILLILITER(S): 50; .745; 4.5 INJECTION, SOLUTION INTRAVENOUS at 08:08

## 2017-09-07 RX ADMIN — CITALOPRAM 10 MILLIGRAM(S): 10 TABLET, FILM COATED ORAL at 12:53

## 2017-09-07 RX ADMIN — MEMANTINE HYDROCHLORIDE 10 MILLIGRAM(S): 10 TABLET ORAL at 18:11

## 2017-09-07 RX ADMIN — SENNA PLUS 2 TABLET(S): 8.6 TABLET ORAL at 21:00

## 2017-09-07 RX ADMIN — Medication 100 MILLIGRAM(S): at 13:03

## 2017-09-07 RX ADMIN — Medication 1000 MILLIGRAM(S): at 20:49

## 2017-09-07 RX ADMIN — LIDOCAINE 1 PATCH: 4 CREAM TOPICAL at 00:44

## 2017-09-07 RX ADMIN — LIDOCAINE 1 APPLICATION(S): 4 CREAM TOPICAL at 07:13

## 2017-09-07 RX ADMIN — TAMSULOSIN HYDROCHLORIDE 0.4 MILLIGRAM(S): 0.4 CAPSULE ORAL at 21:00

## 2017-09-07 RX ADMIN — ATROPA BELLADONNA AND OPIUM 1 SUPPOSITORY(S): 16.2; 6 SUPPOSITORY RECTAL at 03:36

## 2017-09-07 RX ADMIN — Medication 400 MILLIGRAM(S): at 08:18

## 2017-09-07 RX ADMIN — Medication 400 MILLIGRAM(S): at 20:19

## 2017-09-07 RX ADMIN — Medication 50 GRAM(S): at 02:38

## 2017-09-07 RX ADMIN — Medication 255 MILLIMOLE(S): at 06:50

## 2017-09-07 RX ADMIN — LIDOCAINE 1 APPLICATION(S): 4 CREAM TOPICAL at 21:01

## 2017-09-07 RX ADMIN — LIDOCAINE 1 APPLICATION(S): 4 CREAM TOPICAL at 13:03

## 2017-09-07 NOTE — PROGRESS NOTE ADULT - SUBJECTIVE AND OBJECTIVE BOX
Patient is a 88y old  Male who presents with a chief complaint of unwitnessed fall, multiple rib fractures with ptx on xray at urgent center (05 Sep 2017 13:34)      SUBJECTIVE / OVERNIGHT EVENTS:    pt resting in bed confused conversive family friend bedside.  pt denies any pain fever chills      Vital Signs Last 24 Hrs  T(C): 37.5 (07 Sep 2017 15:00), Max: 37.5 (07 Sep 2017 15:00)  T(F): 99.5 (07 Sep 2017 15:00), Max: 99.5 (07 Sep 2017 15:00)  HR: 77 (07 Sep 2017 15:00) (58 - 99)  BP: 123/63 (07 Sep 2017 15:00) (110/54 - 208/89)  BP(mean): 91 (07 Sep 2017 15:00) (78 - 137)  RR: 28 (07 Sep 2017 15:00) (18 - 28)  SpO2: 94% (07 Sep 2017 15:00) (93% - 95%)  I&O's Summary    06 Sep 2017 07:01  -  07 Sep 2017 07:00  --------------------------------------------------------  IN: 1595.5 mL / OUT: 830 mL / NET: 765.5 mL    07 Sep 2017 07:01  -  07 Sep 2017 16:31  --------------------------------------------------------  IN: 550 mL / OUT: 375 mL / NET: 175 mL            LABS:                        12.1   12.7  )-----------( 194      ( 07 Sep 2017 05:23 )             35.1     09-07    137  |  100  |  21  ----------------------------<  136<H>  4.2   |  25  |  1.06    Ca    9.6      07 Sep 2017 05:23  Phos  2.9     09-07  Mg     2.8     09-07        CAPILLARY BLOOD GLUCOSE                RADIOLOGY & ADDITIONAL TESTS:    Imaging Personally Reviewed:  [x] YES  [ ] NO    Consultant(s) Notes Reviewed:  [x] YES  [ ] NO      MEDICATIONS  (STANDING):  enoxaparin Injectable 40 milliGRAM(s) SubCutaneous every 24 hours  citalopram 10 milliGRAM(s) Oral daily  donepezil 10 milliGRAM(s) Oral at bedtime  latanoprost 0.005% Ophthalmic Solution 1 Drop(s) Both EYES at bedtime  memantine 10 milliGRAM(s) Oral every 12 hours  levothyroxine 50 MICROGram(s) Oral daily  lidocaine   Patch 1 Patch Transdermal every 24 hours  docusate sodium 100 milliGRAM(s) Oral three times a day  senna 2 Tablet(s) Oral at bedtime  tamsulosin 0.4 milliGRAM(s) Oral at bedtime  lidocaine 2% Gel 1 Application(s) Topical three times a day    MEDICATIONS  (PRN):  HYDROmorphone  Injectable 0.25 milliGRAM(s) IV Push every 3 hours PRN Severe Pain (7 - 10)  hydrALAZINE Injectable 10 milliGRAM(s) IV Push every 4 hours PRN Systolic greater than 180  QUEtiapine 25 milliGRAM(s) Oral at bedtime PRN aggitation      Care Discussed with Consultants/Other Providers [x] YES  [ ] NO    HEALTH ISSUES - PROBLEM Dx:

## 2017-09-07 NOTE — PROVIDER CONTACT NOTE (OTHER) - SITUATION
CBI restarted for hematuria.  patient c/o pain at brunner, continually reaching for cath and becoming increasingly agitated and aggressive.  no response to haldol ordered by Anand SHANNON.

## 2017-09-07 NOTE — PROVIDER CONTACT NOTE (OTHER) - ASSESSMENT
severe agitation, hitting RN and PCA, trying to get out of bed, no response to haldol given as per MD order. VSS for patient baseline. CBI maintained, noted leaking bloody drainage around site. pt continually pulling at brunner cath.

## 2017-09-07 NOTE — PROGRESS NOTE ADULT - ATTENDING COMMENTS
Examined on AM multidisciplinary rounds with the SICU team.   All relevant studies reviewed.  Agree with above.  Issues include:  Respiratory failure secondary to trauma with hypoxemia.  Moderate A-a gradient but no respiratory distress.   Pneumothorax:  resolved "pigtail" removed  Dementia with agitation alternating with somnolence - baseline  Bradycardia:  s/p interogation of PPM.  Functioning.  No further episodes.  Closed fracture of multiple ribs.  Appears to have adequate pain control.  Will monitor respiratory status throughout the day, recheck ABG (A-a gradient)

## 2017-09-07 NOTE — PROGRESS NOTE ADULT - ASSESSMENT
Postraumatic (fall) multiple R rib fractures and large R lpneumothorax   Respansion R lung post R chest tube insertion  Dementia    REC    Mobilize with PT as tolerated  f/u CXR post CT removal  DVT prophylaxis  Aspiration precautions

## 2017-09-07 NOTE — PROGRESS NOTE PEDS - ASSESSMENT
88M with dementia, recent fall s/p multiple rib fracture and PTX s/p chest tube, now with resolving traumatic gross hematuria    - wean CBI as tolerated  - leave brunner in place

## 2017-09-07 NOTE — PROGRESS NOTE ADULT - ASSESSMENT
88M s/p fall w/ R 5-8 rib fractures and PTX, urinary retention & now hematuria s/p brunner insertion, requiring continuous bladder irrigation.     Neuro: Dementia, likely vascular in etiology  - pain control w/ scheduled IV Tylenol and Dilaudid PRN  - c/w home donepezil and memantine  - c/w citalopram for depression  - con't home seroquel at bedtime  - one to one observation as family states he can become agitated and confused    Resp: R 5-8 rib fractures R PTX, s/p R pigtail placement  - pigtail catheter for decompression of R PTX  - pigtail to WS; remove today.  - IS, OOB, ambulate  - monitor O2 sats  - f/u daily CXR    CV: h/o HTN, CAD s/p stent and PPM  - f/u Cardiology EP interrogation of ppm  - holding home metoprolol as HR is 50  - monitor VS q1hr    GI:  - Regular diet    :  - monitor I/Os  - replete lytes PRN  - 3 way Brunner w/ CBI, per urology  - Flomax    ID:  - no active infectious processes at this time  - monitor WBC w/ daily CBC  - monitor for fevers  - early ambulation and IS use to prevent atelectasis and subsequent pneumonia    Endo:  - c/w home synthroid    Heme:  - monitor CBC  - lovenox for DVT ppx    Dispo: FULL CODE; continue w/ SICU care

## 2017-09-07 NOTE — PROGRESS NOTE ADULT - ASSESSMENT
87 y/o M w/ hx of CAD s/p PPM, dementia, glaucoma, hypothyroisim and parotid neoplasm who presented with R rib 5-8 fractures and pneumothorax after falling in bathtub. He is s/p R chest PTC placment with improvement of PTX. Agitation/confusion has been an issue for patient, requiring sedation with precedex overnight.    - R chest PTC, to water seal, consider removal if lung remains expanded  - Daily CXR  - Pain control  - Wean sedation as tolerated, take measures to orient patient  - Read for urinary retention  - BP control  - Monitor vitals, UOP  - f/u labs  - DVT ppx  - Care per SICU  - Dispo planning    Antwon Marie MD 89 y/o M w/ hx of CAD s/p PPM, dementia, glaucoma, hypothyroisim and parotid neoplasm who presented with R rib 5-8 fractures and pneumothorax after falling in bathtub. He is s/p R chest PTC placment with improvement of PTX. Chest tube water sealed yesterday and patient is breathing well. Agitation/confusion has been an issue for patient, requiring sedation with precedex overnight.    - R chest PTC, to water seal, consider removal if lung remains expanded  - Daily CXR  - Pain control  - Wean sedation as tolerated, take measures to orient patient  - Read for urinary retention  - BP control  - Monitor vitals, UOP  - f/u labs  - DVT ppx  - Care per SICU  - Dispo planning    Antwon Marie MD 87 y/o M w/ hx of CAD s/p PPM, dementia, glaucoma, hypothyroisim and parotid neoplasm who presented with R rib 5-8 fractures and pneumothorax after falling in bathtub. He is s/p R chest PTC placment with improvement of PTX. Chest tube water sealed yesterday and patient is breathing well. Brunner was clogged and urology was consulted overnight to irrigate.     Plan:   - Pigtail chest catheter to WS, likely remove today, follow up with CXR  - Holding metroprolol for bradycardia, VS q1h, EP interrogating ppm  - Regular diet  - 3 way brunner with CBI per urology   - added seroquel at bedtime for agitation  - DVT ppx

## 2017-09-07 NOTE — PROGRESS NOTE PEDS - SUBJECTIVE AND OBJECTIVE BOX
Subjective  urine clear on CBI  occasional bladder spasms overnight    Objective  Vital signs  T(F): , Max: 99 (09-06-17 @ 19:00)  HR: 93 (09-07-17 @ 11:00)  BP: 141/63 (09-07-17 @ 11:00)  SpO2: 93% (09-07-17 @ 11:00)  CBI - clear    Gen: NAD  Abd: soft, non-tender  : Read secure, draining as above    Labs  09-07 @ 05:23    WBC 12.7  / Hct 35.1 (prev 36.6)  / SCr 1.06

## 2017-09-07 NOTE — PROGRESS NOTE ADULT - SUBJECTIVE AND OBJECTIVE BOX
Daily Progress Note    S: Patient seen and examined. Agitated overnight requiring precedex, which he was on when examined this AM. Patient had urinary retention last night and received a brunner catheter. Hypertensive requiring multiple pushes of hydralazine.    O:  Vital Signs Last 24 Hrs  T(C): 36.6 (06 Sep 2017 11:00), Max: 37.2 (06 Sep 2017 07:00)  T(F): 97.9 (06 Sep 2017 11:00), Max: 98.9 (06 Sep 2017 07:00)  HR: 49 (06 Sep 2017 12:00) (49 - 54)  BP: 97/52 (06 Sep 2017 12:00) (91/52 - 205/86)  BP(mean): 72 (06 Sep 2017 12:00) (69 - 137)  RR: 17 (06 Sep 2017 12:00) (14 - 34)  SpO2: 95% (06 Sep 2017 12:00) (91% - 98%)    I&O's Detail    05 Sep 2017 07:01  -  06 Sep 2017 07:00  --------------------------------------------------------  IN:    dexmedetomidine Infusion: 333.8 mL    IV PiggyBack: 200 mL    lactated ringers.: 1125 mL    Oral Fluid: 150 mL  Total IN: 1808.8 mL    OUT:    Indwelling Catheter - Urethral: 970 mL  Total OUT: 970 mL    Total NET: 838.8 mL    06 Sep 2017 07:01  -  06 Sep 2017 12:55  --------------------------------------------------------  IN:    dexmedetomidine Infusion: 33 mL    lactated ringers.: 375 mL  Total IN: 408 mL    OUT:    Indwelling Catheter - Urethral: 640 mL  Total OUT: 640 mL    Total NET: -232 mL    MEDICATIONS  (STANDING):  lactated ringers. 1000 milliLiter(s) (75 mL/Hr) IV Continuous <Continuous>  acetaminophen  IVPB. 1000 milliGRAM(s) IV Intermittent once  enoxaparin Injectable 40 milliGRAM(s) SubCutaneous every 24 hours  citalopram 10 milliGRAM(s) Oral daily  donepezil 10 milliGRAM(s) Oral at bedtime  latanoprost 0.005% Ophthalmic Solution 1 Drop(s) Both EYES at bedtime  memantine 10 milliGRAM(s) Oral every 12 hours  levothyroxine 50 MICROGram(s) Oral daily  lidocaine   Patch 1 Patch Transdermal every 24 hours  docusate sodium 100 milliGRAM(s) Oral three times a day  senna 2 Tablet(s) Oral at bedtime  tamsulosin 0.4 milliGRAM(s) Oral at bedtime    MEDICATIONS  (PRN):  HYDROmorphone  Injectable 0.25 milliGRAM(s) IV Push every 3 hours PRN Severe Pain (7 - 10)  hydrALAZINE Injectable 10 milliGRAM(s) IV Push every 4 hours PRN Systolic greater than 180                       12.1   7.6   )-----------( 163      ( 06 Sep 2017 05:46 )             35.2     09-06    136  |  100  |  19  ----------------------------<  138<H>  4.1   |  25  |  0.87    Ca    9.1      06 Sep 2017 05:46  Phos  2.6     09-06  Mg     1.9     09-06    TPro  7.5  /  Alb  4.2  /  TBili  0.6  /  DBili  x   /  AST  39  /  ALT  28  /  AlkPhos  132<H>  09-05      Physical Exam:  Gen: Laying in bed, NAD, sedated on precedex  Resp: Unlabored breathing, satting well on RA  Abd: soft, NT, ND    Lines:   IV: patent, in place. Daily Progress Note    S: Patient seen and examined am. Overnight, patient's brunner stopped working and was irrigated multiple times with bloody return with clots. pigtail catheter placed to water seal. Patient was also agitated overnight and started on home seroquel at bedtime with haldol given PRN.    O:  Vital Signs Last 24 Hrs  T(C): 36.6 (06 Sep 2017 11:00), Max: 37.2 (06 Sep 2017 07:00)  T(F): 97.9 (06 Sep 2017 11:00), Max: 98.9 (06 Sep 2017 07:00)  HR: 49 (06 Sep 2017 12:00) (49 - 54)  BP: 97/52 (06 Sep 2017 12:00) (91/52 - 205/86)  BP(mean): 72 (06 Sep 2017 12:00) (69 - 137)  RR: 17 (06 Sep 2017 12:00) (14 - 34)  SpO2: 95% (06 Sep 2017 12:00) (91% - 98%)    I&O's Detail    05 Sep 2017 07:01  -  06 Sep 2017 07:00  --------------------------------------------------------  IN:    dexmedetomidine Infusion: 333.8 mL    IV PiggyBack: 200 mL    lactated ringers.: 1125 mL    Oral Fluid: 150 mL  Total IN: 1808.8 mL    OUT:    Indwelling Catheter - Urethral: 970 mL  Total OUT: 970 mL    Total NET: 838.8 mL    06 Sep 2017 07:01  -  06 Sep 2017 12:55  --------------------------------------------------------  IN:    dexmedetomidine Infusion: 33 mL    lactated ringers.: 375 mL  Total IN: 408 mL    OUT:    Indwelling Catheter - Urethral: 640 mL  Total OUT: 640 mL    Total NET: -232 mL    MEDICATIONS  (STANDING):  lactated ringers. 1000 milliLiter(s) (75 mL/Hr) IV Continuous <Continuous>  acetaminophen  IVPB. 1000 milliGRAM(s) IV Intermittent once  enoxaparin Injectable 40 milliGRAM(s) SubCutaneous every 24 hours  citalopram 10 milliGRAM(s) Oral daily  donepezil 10 milliGRAM(s) Oral at bedtime  latanoprost 0.005% Ophthalmic Solution 1 Drop(s) Both EYES at bedtime  memantine 10 milliGRAM(s) Oral every 12 hours  levothyroxine 50 MICROGram(s) Oral daily  lidocaine   Patch 1 Patch Transdermal every 24 hours  docusate sodium 100 milliGRAM(s) Oral three times a day  senna 2 Tablet(s) Oral at bedtime  tamsulosin 0.4 milliGRAM(s) Oral at bedtime    MEDICATIONS  (PRN):  HYDROmorphone  Injectable 0.25 milliGRAM(s) IV Push every 3 hours PRN Severe Pain (7 - 10)  hydrALAZINE Injectable 10 milliGRAM(s) IV Push every 4 hours PRN Systolic greater than 180                       12.1   7.6   )-----------( 163      ( 06 Sep 2017 05:46 )             35.2     09-06    136  |  100  |  19  ----------------------------<  138<H>  4.1   |  25  |  0.87    Ca    9.1      06 Sep 2017 05:46  Phos  2.6     09-06  Mg     1.9     09-06    TPro  7.5  /  Alb  4.2  /  TBili  0.6  /  DBili  x   /  AST  39  /  ALT  28  /  AlkPhos  132<H>  09-05      Physical Exam:  Gen: Laying in bed, NAD  Resp: Unlabored breathing, satting well on RA  Abd: soft, NT, ND    Lines:   IV: patent, in place.

## 2017-09-07 NOTE — PROGRESS NOTE ADULT - ASSESSMENT
89 y/o M w/ hx of HTN HLD CAD s/p PPM, dementia aaox 1-2 at baseline, glaucoma, hypothyroidism and parotid neoplasm who presented s/p fall in the bathtub found to have  R rib 5-8 fractures and pneumothorax s/p chest tube and removal.   Pneumothorax resolved.      Rib Fx / pneumothorax chest tube out repeat cxr no pneumothorax.  pain controlled     Bradycardia ? BB stopped by primary team Cardio consult Dr Jovel called and pending.  dicussed with Dr Jovel.  order ECHO    Bladder wall thickening - consider UA/UC discussed with surgery team r/o UTI in setting of fall in elderly demented pt    HTN HLD CAD s/p PPM - bp  controlled monitor c/w telemetry     Dementia - c/w namenda sedation PRN in effort to prevent further exacerbation of pneumo / rib fx     DVT PPX      case discussed with Outpt PCP Dr Omar Tariq

## 2017-09-07 NOTE — PROGRESS NOTE ADULT - SUBJECTIVE AND OBJECTIVE BOX
88M PMHx HTN, CAD s/p stent, dementia, s/p b/l TKR, L "shoulder surgery", R CEA approximately 2 years ago at [place], and resection of b/l "neck cancer" approximately 15 years ago. He presented to [facility] ED on the morning of 9/5/17 after slipping and falling getting out of the shower. He was found down after approximately 30 minutes. When his son attempted to help him up, he slipped again, landing on his back/buttocks. In the trauma bay, he was found to have a large right sided pneumothorax. A pigtail catheter was placed in the R anterior chest. A follow up chest CT showed resolution of the R PTX and acute mildly displaced fractures of the R 4-8 ribs. Admitted to SICU for observation.    24 HOUR EVENTS:   -Yesterday am UOP via brunner stopped; attempts to flush/reposition failed to return urine.  -brunner removed & replaced, however bloody return w/ clots  -urology consulted, and started continuous bladder irrigation.  -R anterior chest pigtail catheter placed to water seal; CXR stable.  -significantly confused and agitated overnight.  -restarted on home seroquel at bedtime; given 1x 5 IV haldol for agitation. 88M PMHx HTN, CAD s/p stent, dementia, s/p b/l TKR, L "shoulder surgery", R CEA approximately 2 years ago at [place], and resection of b/l "neck cancer" approximately 15 years ago. He presented to [facility] ED on the morning of 9/5/17 after slipping and falling getting out of the shower. He was found down after approximately 30 minutes. When his son attempted to help him up, he slipped again, landing on his back/buttocks. In the trauma bay, he was found to have a large right sided pneumothorax. A pigtail catheter was placed in the R anterior chest. A follow up chest CT showed resolution of the R PTX and acute mildly displaced fractures of the R 4-8 ribs. Admitted to SICU for observation.    24 HOUR EVENTS:   -Yesterday am UOP via brunner stopped; attempts to flush/reposition failed to return urine.  -brunner removed & replaced, however bloody return w/ clots  -urology consulted, and started continuous bladder irrigation.  -R anterior chest pigtail catheter placed to water seal; CXR stable.  -significantly confused and agitated overnight.  -restarted on home seroquel at bedtime; given haldol for agitation overnight      SUBJECTIVE/ROS:  [ ] A ten-point review of systems was otherwise negative except as noted.  [x] Due to altered mental status/intubation, subjective information were not able to be obtained from the patient. History was obtained, to the extent possible, from review of the chart and collateral sources of information.      NEURO  RASS:  +1  Exam: A&Ox1 (self); confused; waxing/waning sensorium.  Meds: HYDROmorphone  Injectable 0.25 milliGRAM(s) IV Push every 3 hours PRN Severe Pain (7 - 10)  citalopram 10 milliGRAM(s) Oral daily  donepezil 10 milliGRAM(s) Oral at bedtime  memantine 10 milliGRAM(s) Oral every 12 hours  QUEtiapine 25 milliGRAM(s) Oral at bedtime PRN aggitation    [x] Adequacy of sedation and pain control has been assessed and adjusted      RESPIRATORY  RR: 26 (09-07-17 @ 05:00) (15 - 29)  SpO2: 94% (09-07-17 @ 05:00) (93% - 97%)  Wt(kg): --  Exam: unlabored, clear to auscultation bilaterally  Mechanical Ventilation:   ABG - ( 06 Sep 2017 13:04 )  pH: 7.47  /  pCO2: 33    /  pO2: 93    / HCO3: 23    / Base Excess: .6    /  SaO2: 98      Lactate: x          [n/a ] Extubation Readiness Assessed  Meds: n/a      CARDIOVASCULAR  HR: 66 (09-07-17 @ 05:00) (49 - 95)  BP: 157/70 (09-07-17 @ 05:00) (91/52 - 208/89)  BP(mean): 100 (09-07-17 @ 05:00) (69 - 137)  ABP: --  ABP(mean): --  Wt(kg): --  CVP(cm H2O): --  VBG - ( 05 Sep 2017 11:59 )  pH: 7.34  /  pCO2: 55    /  pO2: 37    / HCO3: 29    / Base Excess: 2.3   /  SaO2: 63     Lactate: 2.6            Exam: regular  Cardiac Rhythm: paced at 50bpm  Perfusion     [x]Adequate   [ ]Inadequate  Mentation   [ ]Normal       [x]Reduced  Extremities  [x]Warm         [ ]Cool  Volume Status [ ]Hypervolemic [x]Euvolemic [ ]Hypovolemic  Meds: tamsulosin 0.4 milliGRAM(s) Oral at bedtime  hydrALAZINE Injectable 10 milliGRAM(s) IV Push every 4 hours PRN Systolic greater than 180        GI/NUTRITION  Exam: soft, NT, ND  Diet:  Meds: docusate sodium 100 milliGRAM(s) Oral three times a day  senna 2 Tablet(s) Oral at bedtime      GENITOURINARY  I&O's Detail  ************************        09-06    136  |  100  |  19  ----------------------------<  138<H>  4.1   |  25  |  0.87    Ca    9.1      06 Sep 2017 05:46  Phos  2.6     09-06  Mg     1.9     09-06    TPro  7.5  /  Alb  4.2  /  TBili  0.6  /  DBili  x   /  AST  39  /  ALT  28  /  AlkPhos  132<H>  09-05    [x] Brunner catheter, indication: urinary retention & hematuria  Meds: dextrose 5% + sodium chloride 0.45% with potassium chloride 20 mEq/L 1000 milliLiter(s) IV Continuous <Continuous>        HEMATOLOGIC  Meds: enoxaparin Injectable 40 milliGRAM(s) SubCutaneous every 24 hours    [x] VTE Prophylaxis                        12.8   10.0  )-----------( 180      ( 06 Sep 2017 13:10 )             36.6     PT/INR - ( 05 Sep 2017 11:59 )   PT: 11.2 sec;   INR: 1.04 ratio         PTT - ( 05 Sep 2017 11:59 )  PTT:32.1 sec  Transfusion: none     [ ] PRBC   [ ] Platelets   [ ] FFP   [ ] Cryoprecipitate      INFECTIOUS DISEASES  T(C): 37.2 (09-07-17 @ 03:00), Max: 37.2 (09-06-17 @ 07:00)  Wt(kg): --  WBC Count: 10.0 K/uL (09-06 @ 13:10)  WBC Count: 7.6 K/uL (09-06 @ 05:46)    Recent Cultures: none    Meds: none      ENDOCRINE  Capillary Blood Glucose    Meds: levothyroxine 50 MICROGram(s) Oral daily        ACCESS DEVICES:  [x] Peripheral IV  [ ] Central Venous Line	[ ] R	[ ] L	[ ] IJ	[ ] Fem	[ ] SC	Placed:   [ ] Arterial Line		[ ] R	[ ] L	[ ] Fem	[ ] Rad	[ ] Ax	Placed:   [ ] PICC:					[ ] Mediport  [x] Urinary Catheter, Date Placed: 9/6  [x] Necessity of urinary, arterial, and venous catheters discussed    OTHER MEDICATIONS:  latanoprost 0.005% Ophthalmic Solution 1 Drop(s) Both EYES at bedtime  lidocaine   Patch 1 Patch Transdermal every 24 hours  lidocaine 2% Gel 1 Application(s) Topical three times a day      CODE STATUS: FULL    IMAGING:  CXR  9/6/17    Indication: Multiple rib fractures. Right pneumothorax.    Impression:    The heart is normal in size. A right chest tube was placed and there is   reexpansion of the right lung. No pneumothorax. No acute consolidation   could be identified. A pacer is in good position. Subcutaneous emphysema   is seen on the right. Status post left shoulder replacement. Metallic   clips are seen in the soft tissues of the right neck. 88M PMHx HTN, CAD s/p stent, dementia, s/p b/l TKR, L "shoulder surgery", R CEA approximately 2 years ago at [place], and resection of b/l "neck cancer" approximately 15 years ago. He presented to [facility] ED on the morning of 9/5/17 after slipping and falling getting out of the shower. He was found down after approximately 30 minutes. When his son attempted to help him up, he slipped again, landing on his back/buttocks. In the trauma bay, he was found to have a large right sided pneumothorax. A pigtail catheter was placed in the R anterior chest. A follow up chest CT showed resolution of the R PTX and acute mildly displaced fractures of the R 4-8 ribs. Admitted to SICU for observation.    24 HOUR EVENTS:   -Yesterday am UOP via brunner stopped; attempts to flush/reposition failed to return urine.  -brunner removed & replaced, however bloody return w/ clots  -urology consulted, and started continuous bladder irrigation.  -R anterior chest pigtail catheter placed to water seal; CXR stable.  -significantly confused and agitated overnight.  -restarted on home seroquel at bedtime; given haldol for agitation overnight      SUBJECTIVE/ROS:  [ ] A ten-point review of systems was otherwise negative except as noted.  [x] Due to altered mental status/intubation, subjective information were not able to be obtained from the patient. History was obtained, to the extent possible, from review of the chart and collateral sources of information.      NEURO  RASS:  +1  Exam: A&Ox1 (self); confused; waxing/waning sensorium.  Meds: HYDROmorphone  Injectable 0.25 milliGRAM(s) IV Push every 3 hours PRN Severe Pain (7 - 10)  citalopram 10 milliGRAM(s) Oral daily  donepezil 10 milliGRAM(s) Oral at bedtime  memantine 10 milliGRAM(s) Oral every 12 hours  QUEtiapine 25 milliGRAM(s) Oral at bedtime PRN aggitation    [x] Adequacy of sedation and pain control has been assessed and adjusted      RESPIRATORY  RR: 26 (09-07-17 @ 05:00) (15 - 29)  SpO2: 94% (09-07-17 @ 05:00) (93% - 97%)  Wt(kg): --  Exam: unlabored, clear to auscultation bilaterally  Mechanical Ventilation:   ABG - ( 06 Sep 2017 13:04 )  pH: 7.47  /  pCO2: 33    /  pO2: 93    / HCO3: 23    / Base Excess: .6    /  SaO2: 98      Lactate: x          [n/a ] Extubation Readiness Assessed  Meds: n/a      CARDIOVASCULAR  HR: 66 (09-07-17 @ 05:00) (49 - 95)  BP: 157/70 (09-07-17 @ 05:00) (91/52 - 208/89)  BP(mean): 100 (09-07-17 @ 05:00) (69 - 137)  ABP: --  ABP(mean): --  Wt(kg): --  CVP(cm H2O): --  VBG - ( 05 Sep 2017 11:59 )  pH: 7.34  /  pCO2: 55    /  pO2: 37    / HCO3: 29    / Base Excess: 2.3   /  SaO2: 63     Lactate: 2.6            Exam: regular  Cardiac Rhythm: paced at 50bpm  Perfusion     [x]Adequate   [ ]Inadequate  Mentation   [ ]Normal       [x]Reduced  Extremities  [x]Warm         [ ]Cool  Volume Status [ ]Hypervolemic [x]Euvolemic [ ]Hypovolemic  Meds: tamsulosin 0.4 milliGRAM(s) Oral at bedtime  hydrALAZINE Injectable 10 milliGRAM(s) IV Push every 4 hours PRN Systolic greater than 180        GI/NUTRITION  Exam: soft, NT, ND  Diet:  Meds: docusate sodium 100 milliGRAM(s) Oral three times a day  senna 2 Tablet(s) Oral at bedtime      GENITOURINARY  I&O's Detail  I&O's Detail    05 Sep 2017 07:01  -  06 Sep 2017 07:00  --------------------------------------------------------  IN:    dexmedetomidine Infusion: 333.8 mL    IV PiggyBack: 200 mL    lactated ringers.: 1125 mL    Oral Fluid: 150 mL  Total IN: 1808.8 mL    OUT:    Chest Tube: 75 mL    Indwelling Catheter - Urethral: 970 mL  Total OUT: 1045 mL    Total NET: 763.8 mL      06 Sep 2017 07:01  -  07 Sep 2017 06:49  --------------------------------------------------------  IN:    dexmedetomidine Infusion: 33 mL    dextrose 5% + sodium chloride 0.45% with potassium chloride 20 mEq/L: 450 mL    IV PiggyBack: 150 mL    lactated ringers.: 900 mL  Total IN: 1533 mL    OUT:    Chest Tube: 55 mL    Indwelling Catheter - Urethral: 775 mL  Total OUT: 830 mL    Total NET: 703 mL          09-06    136  |  100  |  19  ----------------------------<  138<H>  4.1   |  25  |  0.87    Ca    9.1      06 Sep 2017 05:46  Phos  2.6     09-06  Mg     1.9     09-06    TPro  7.5  /  Alb  4.2  /  TBili  0.6  /  DBili  x   /  AST  39  /  ALT  28  /  AlkPhos  132<H>  09-05    [x] Brunner catheter, indication: urinary retention & hematuria  Meds: dextrose 5% + sodium chloride 0.45% with potassium chloride 20 mEq/L 1000 milliLiter(s) IV Continuous <Continuous>        HEMATOLOGIC  Meds: enoxaparin Injectable 40 milliGRAM(s) SubCutaneous every 24 hours    [x] VTE Prophylaxis                        12.8   10.0  )-----------( 180      ( 06 Sep 2017 13:10 )             36.6     PT/INR - ( 05 Sep 2017 11:59 )   PT: 11.2 sec;   INR: 1.04 ratio         PTT - ( 05 Sep 2017 11:59 )  PTT:32.1 sec  Transfusion: none     [ ] PRBC   [ ] Platelets   [ ] FFP   [ ] Cryoprecipitate      INFECTIOUS DISEASES  T(C): 37.2 (09-07-17 @ 03:00), Max: 37.2 (09-06-17 @ 07:00)  Wt(kg): --  WBC Count: 10.0 K/uL (09-06 @ 13:10)  WBC Count: 7.6 K/uL (09-06 @ 05:46)    Recent Cultures: none    Meds: none      ENDOCRINE  Capillary Blood Glucose    Meds: levothyroxine 50 MICROGram(s) Oral daily        ACCESS DEVICES:  [x] Peripheral IV  [ ] Central Venous Line	[ ] R	[ ] L	[ ] IJ	[ ] Fem	[ ] SC	Placed:   [ ] Arterial Line		[ ] R	[ ] L	[ ] Fem	[ ] Rad	[ ] Ax	Placed:   [ ] PICC:					[ ] Mediport  [x] Urinary Catheter, Date Placed: 9/6  [x] Necessity of urinary, arterial, and venous catheters discussed    OTHER MEDICATIONS:  latanoprost 0.005% Ophthalmic Solution 1 Drop(s) Both EYES at bedtime  lidocaine   Patch 1 Patch Transdermal every 24 hours  lidocaine 2% Gel 1 Application(s) Topical three times a day      CODE STATUS: FULL    IMAGING:  CXR  9/6/17    Indication: Multiple rib fractures. Right pneumothorax.    Impression:    The heart is normal in size. A right chest tube was placed and there is   reexpansion of the right lung. No pneumothorax. No acute consolidation   could be identified. A pacer is in good position. Subcutaneous emphysema   is seen on the right. Status post left shoulder replacement. Metallic   clips are seen in the soft tissues of the right neck.

## 2017-09-07 NOTE — PROCEDURE NOTE - ADDITIONAL PROCEDURE DETAILS
1) Indication for interrogation: Bradycardia noted on Tele  2) Measured data WNL, NL PM function, Pt is not PM dependent  3) Stored data revealed no events for review.  4) Review of stored lazara events on Tele revealed NSR at rate  with undersensing.   5) Changes made: Ventricular output adjusted based on measured ventricular pacing thresholds, this is being done to allow for 2:1 safety margin and to preserve battery life.    [id number]

## 2017-09-07 NOTE — PROGRESS NOTE ADULT - SUBJECTIVE AND OBJECTIVE BOX
Follow-up Pulm Progress Note  Stefan Vides MD  [phone]    No new respiratory events overnight.   Events noted: Chest tube removed this AM  Pre CXR without PTX, segmental ateleectasis  AFebrile and hemodynamically stable  Awake, confused, yet without c/o CP or SOB    Medications:  Vital Signs Last 24 Hrs  T(C): 37.2 (07 Sep 2017 07:00), Max: 37.2 (06 Sep 2017 15:00)  T(F): 98.9 (07 Sep 2017 07:00), Max: 99 (06 Sep 2017 19:00)  HR: 99 (07 Sep 2017 07:00) (49 - 99)  BP: 141/73 (07 Sep 2017 07:00) (97/52 - 208/89)  BP(mean): 92 (07 Sep 2017 07:00) (72 - 137)  RR: 22 (07 Sep 2017 07:00) (15 - 29)  SpO2: 94% (07 Sep 2017 07:00) (93% - 97%)  ABG - ( 06 Sep 2017 13:04 )  pH: 7.47  /  pCO2: 33    /  pO2: 93    / HCO3: 23    / Base Excess: .6    /  SaO2: 98          09-06 @ 07:01  -  09-07 @ 07:00  --------------------------------------------------------  IN: 1595.5 mL / OUT: 830 mL / NET: 765.5 mL        LABS:                        12.1   12.7  )-----------( 194      ( 07 Sep 2017 05:23 )             35.1     09-07    137  |  100  |  21  ----------------------------<  136<H>  4.2   |  25  |  1.06    Ca    9.6      07 Sep 2017 05:23  Phos  2.9     09-07  Mg     2.8     09-07    TPro  7.5  /  Alb  4.2  /  TBili  0.6  /  DBili  x   /  AST  39  /  ALT  28  /  AlkPhos  132<H>  09-05      PT/INR - ( 05 Sep 2017 11:59 )   PT: 11.2 sec;   INR: 1.04 ratio         PTT - ( 05 Sep 2017 11:59 )  PTT:32.1 sec        CULTURES:        Physical Examination:  PULM:   CVS: Regular rate and rhythm, no murmurs, rubs, or gallops  ABD: Soft, non-tender  EXT:  No clubbing, cyanosis, or edema    RADIOLOGY REVIEWED  CXR:    CT chest:    TTE:

## 2017-09-08 ENCOUNTER — TRANSCRIPTION ENCOUNTER (OUTPATIENT)
Age: 82
End: 2017-09-08

## 2017-09-08 DIAGNOSIS — R31.9 HEMATURIA, UNSPECIFIED: ICD-10-CM

## 2017-09-08 LAB
ANION GAP SERPL CALC-SCNC: 13 MMOL/L — SIGNIFICANT CHANGE UP (ref 5–17)
BUN SERPL-MCNC: 21 MG/DL — SIGNIFICANT CHANGE UP (ref 7–23)
CA-I BLD-SCNC: 1.24 MMOL/L — SIGNIFICANT CHANGE UP (ref 1.12–1.3)
CALCIUM SERPL-MCNC: 9.8 MG/DL — SIGNIFICANT CHANGE UP (ref 8.4–10.5)
CHLORIDE SERPL-SCNC: 100 MMOL/L — SIGNIFICANT CHANGE UP (ref 96–108)
CO2 SERPL-SCNC: 26 MMOL/L — SIGNIFICANT CHANGE UP (ref 22–31)
CREAT SERPL-MCNC: 1.11 MG/DL — SIGNIFICANT CHANGE UP (ref 0.5–1.3)
GLUCOSE SERPL-MCNC: 130 MG/DL — HIGH (ref 70–99)
HCT VFR BLD CALC: 33.9 % — LOW (ref 39–50)
HGB BLD-MCNC: 11.6 G/DL — LOW (ref 13–17)
MAGNESIUM SERPL-MCNC: 2.2 MG/DL — SIGNIFICANT CHANGE UP (ref 1.6–2.6)
MCHC RBC-ENTMCNC: 34.4 GM/DL — SIGNIFICANT CHANGE UP (ref 32–36)
MCHC RBC-ENTMCNC: 34.5 PG — HIGH (ref 27–34)
MCV RBC AUTO: 100 FL — SIGNIFICANT CHANGE UP (ref 80–100)
PHOSPHATE SERPL-MCNC: 2.5 MG/DL — SIGNIFICANT CHANGE UP (ref 2.5–4.5)
PLATELET # BLD AUTO: 204 K/UL — SIGNIFICANT CHANGE UP (ref 150–400)
POTASSIUM SERPL-MCNC: 3.9 MMOL/L — SIGNIFICANT CHANGE UP (ref 3.5–5.3)
POTASSIUM SERPL-SCNC: 3.9 MMOL/L — SIGNIFICANT CHANGE UP (ref 3.5–5.3)
RBC # BLD: 3.37 M/UL — LOW (ref 4.2–5.8)
RBC # FLD: 12 % — SIGNIFICANT CHANGE UP (ref 10.3–14.5)
SODIUM SERPL-SCNC: 139 MMOL/L — SIGNIFICANT CHANGE UP (ref 135–145)
WBC # BLD: 10.9 K/UL — HIGH (ref 3.8–10.5)
WBC # FLD AUTO: 10.9 K/UL — HIGH (ref 3.8–10.5)

## 2017-09-08 PROCEDURE — 71010: CPT | Mod: 26

## 2017-09-08 PROCEDURE — 99231 SBSQ HOSP IP/OBS SF/LOW 25: CPT

## 2017-09-08 PROCEDURE — 93010 ELECTROCARDIOGRAM REPORT: CPT

## 2017-09-08 PROCEDURE — 99232 SBSQ HOSP IP/OBS MODERATE 35: CPT

## 2017-09-08 RX ORDER — LIDOCAINE 4 G/100G
1 CREAM TOPICAL
Qty: 0 | Refills: 0 | COMMUNITY
Start: 2017-09-08

## 2017-09-08 RX ORDER — ACETAMINOPHEN 500 MG
325 TABLET ORAL EVERY 4 HOURS
Qty: 0 | Refills: 0 | Status: DISCONTINUED | OUTPATIENT
Start: 2017-09-08 | End: 2017-09-20

## 2017-09-08 RX ORDER — QUETIAPINE FUMARATE 200 MG/1
25 TABLET, FILM COATED ORAL AT BEDTIME
Qty: 0 | Refills: 0 | Status: DISCONTINUED | OUTPATIENT
Start: 2017-09-08 | End: 2017-09-10

## 2017-09-08 RX ORDER — DOCUSATE SODIUM 100 MG
1 CAPSULE ORAL
Qty: 0 | Refills: 0 | COMMUNITY
Start: 2017-09-08

## 2017-09-08 RX ORDER — TAMSULOSIN HYDROCHLORIDE 0.4 MG/1
1 CAPSULE ORAL
Qty: 0 | Refills: 0 | COMMUNITY
Start: 2017-09-08

## 2017-09-08 RX ORDER — SENNA PLUS 8.6 MG/1
2 TABLET ORAL
Qty: 0 | Refills: 0 | COMMUNITY
Start: 2017-09-08

## 2017-09-08 RX ORDER — QUETIAPINE FUMARATE 200 MG/1
1 TABLET, FILM COATED ORAL
Qty: 0 | Refills: 0 | COMMUNITY
Start: 2017-09-08

## 2017-09-08 RX ADMIN — LIDOCAINE 1 APPLICATION(S): 4 CREAM TOPICAL at 21:31

## 2017-09-08 RX ADMIN — LIDOCAINE 1 PATCH: 4 CREAM TOPICAL at 13:00

## 2017-09-08 RX ADMIN — QUETIAPINE FUMARATE 25 MILLIGRAM(S): 200 TABLET, FILM COATED ORAL at 21:30

## 2017-09-08 RX ADMIN — MEMANTINE HYDROCHLORIDE 10 MILLIGRAM(S): 10 TABLET ORAL at 19:33

## 2017-09-08 RX ADMIN — DONEPEZIL HYDROCHLORIDE 10 MILLIGRAM(S): 10 TABLET, FILM COATED ORAL at 21:30

## 2017-09-08 RX ADMIN — TAMSULOSIN HYDROCHLORIDE 0.4 MILLIGRAM(S): 0.4 CAPSULE ORAL at 21:30

## 2017-09-08 RX ADMIN — LIDOCAINE 1 PATCH: 4 CREAM TOPICAL at 00:06

## 2017-09-08 RX ADMIN — LIDOCAINE 1 APPLICATION(S): 4 CREAM TOPICAL at 05:16

## 2017-09-08 RX ADMIN — Medication 50 MICROGRAM(S): at 05:16

## 2017-09-08 RX ADMIN — MEMANTINE HYDROCHLORIDE 10 MILLIGRAM(S): 10 TABLET ORAL at 05:16

## 2017-09-08 RX ADMIN — CITALOPRAM 10 MILLIGRAM(S): 10 TABLET, FILM COATED ORAL at 13:01

## 2017-09-08 RX ADMIN — Medication 100 MILLIGRAM(S): at 05:16

## 2017-09-08 RX ADMIN — ENOXAPARIN SODIUM 40 MILLIGRAM(S): 100 INJECTION SUBCUTANEOUS at 13:00

## 2017-09-08 RX ADMIN — Medication 100 MILLIGRAM(S): at 13:01

## 2017-09-08 RX ADMIN — HALOPERIDOL DECANOATE 5 MILLIGRAM(S): 100 INJECTION INTRAMUSCULAR at 00:06

## 2017-09-08 NOTE — PROVIDER CONTACT NOTE (OTHER) - SITUATION
pt sound wet from the lung, unable to use the spirometer, due to ribs fracture unable to cup and clap. Also pt is restless, is pulling on catheter,  poor output, and some leakage around catheter.

## 2017-09-08 NOTE — DISCHARGE NOTE ADULT - PATIENT PORTAL LINK FT
“You can access the [employer] Patient Portal, offered by [facility], by registering with the following website: [email or web address]”

## 2017-09-08 NOTE — PROGRESS NOTE ADULT - ASSESSMENT
88M s/p fall w/ R 5-8 rib fractures and PTX, urinary retention & now hematuria s/p brunner insertion, requiring continuous bladder irrigation.     Neuro: Dementia, likely vascular in etiology  - pain control w/ scheduled IV Tylenol and Dilaudid PRN  - c/w home donepezil and memantine  - c/w citalopram for depression  - con't home seroquel at bedtime  - one to one observation as family states he can become agitated and confused  -haldol futile overnight    Resp: R 5-8 rib fractures R PTX, s/p R pigtail placement and removal  - IS, OOB, ambulate  - monitor O2 sats  - f/u daily CXR    CV: h/o HTN, CAD s/p stent and PPM  - f/u Cardiology s/p  EP interrogation of ppm  - holding home metoprolol as HR is 50  - monitor VS q1hr    GI:  - Regular diet    :  - monitor I/Os  - replete lytes PRN  - 3 way Brunner holding CBI, per urology  - Flomax    ID:  - no active infectious processes at this time  - monitor WBC w/ daily CBC  - monitor for fevers  - early ambulation and IS use to prevent atelectasis and subsequent pneumonia    Endo:  - c/w home synthroid    Heme:  - monitor CBC  - lovenox for DVT ppx    Dispo: FULL CODE; awaiting bed on the floor pending clinically stable

## 2017-09-08 NOTE — DISCHARGE NOTE ADULT - HOSPITAL COURSE
89 y/o M with PMH CAD s/p PPM, dementia, glaucoma, hypothyroidism and parotid neoplasm presented to an urgent care on 09/05/17 for right thoracic pain S/P unwitnessed fall, and was referred to [facility] ED after being diagnosed with multiple rib fractures and acute mildly displaced fractures of the right 5th through 8th ribs and right pneumothorax. Right sided pigtail catheter was placed in the ED. CT chest abdomen and pelvis showed acute mildly displaced fractures of the right 5th through 8th ribs and interval placement of right pigtail catheter with reexpansion of the right lung. CT brain and CT c-spine were also performed at that time, as below. Patient was monitored in the SICU x 4 days, treated with Lovenox for DVT ppx and pain management. Pts brunner was not draining well d/t traumatic gross hematuria, and CBI was placed. Hematuria resolved. CBI was weened. Brunner remained in place to be assessed as outpatient with urology. Chest tube removed on 09/08/17. Pulm followed. Patient is to follow up with Dr. Collazo and with his primary care provider within 2 weeks of discharge. Medicine evaluated patient. PT recomended... 87 y/o M with PMH CAD s/p PPM, dementia, glaucoma, hypothyroidism and parotid neoplasm presented to an urgent care on 09/05/17 for right thoracic pain S/P unwitnessed fall, and was referred to [facility] ED after being diagnosed with multiple rib fractures and acute mildly displaced fractures of the right 5th through 8th ribs and right pneumothorax. Right sided pigtail catheter was placed in the ED. CT chest abdomen and pelvis showed acute mildly displaced fractures of the right 5th through 8th ribs and interval placement of right pigtail catheter with reexpansion of the right lung. CT brain and CT c-spine were also performed at that time, as below. Patient was monitored in the SICU x 4 days, treated with Lovenox for DVT ppx and pain management. Pts brunner was not draining well d/t traumatic gross hematuria, and CBI was placed. Hematuria resolved. CBI was weened. Brunner remained in place to be assessed as outpatient with urology. Chest tube removed on 09/08/17. Pulm followed. Patient is to follow up with Dr. Collazo and with his primary care provider within 2 weeks of discharge. Medicine evaluated patient. Physical therapy evaluated pt and recommended Subacute Rehabilitation Facility.  Pt  however, is refusing subacute rehab, and will be doing home PT instead.  Pain is now well controlled.  Pt is tolerating a diet, voiding and ambulating.  Pt is ready for discharge in stable condition.  Pt will follow up with Dr. Collazo, and his primary care provider, as an outpatient in one to two weeks. 89 y/o M with PMH CAD s/p PPM, dementia, glaucoma, hypothyroidism and parotid neoplasm presented to an urgent care on 09/05/17 for right thoracic pain S/P unwitnessed fall, and was referred to [facility] ED after being diagnosed with multiple rib fractures and acute mildly displaced fractures of the right 5th through 8th ribs and right pneumothorax. Right sided pigtail catheter was placed in the ED. CT chest abdomen and pelvis showed acute mildly displaced fractures of the right 5th through 8th ribs and interval placement of right pigtail catheter with reexpansion of the right lung. CT brain and CT c-spine were also performed at that time, as below. Patient was monitored in the SICU x 4 days, treated with Lovenox for DVT ppx and pain management. Pts brunner was not draining well d/t traumatic gross hematuria, and CBI was placed. Hematuria resolved. CBI was weened. Brunner remained in place to be assessed as outpatient with urology. Chest tube removed on 09/08/17. Pulm followed. On 9/15, H/H dropped Hct 11.6 -> 7.2 with episode of melena, followed by bloody bowel movement and LOC, Hct 6.0 given 2U pRBC, repeat Hct 8.9, brought back to SICU, hct down to 7.3 on am labs, additional 1U pRBC given. GI to perform EGD on 9/16 which revealed a small hiatus hernia, Congestive gastropathy. Biopsied, One non-bleeding duodenal ulcer with a flat pigmented spot (Ian Class IIc), Multiple non-bleeding duodenal ulcers with a clean ulcer base (Ian Class III). One non-bleeding duodenal ulcer with a nonbleeding visible vessel (Ian Class IIa). Clip (MR conditional) was placed. Normal 2nd part of the duodenum. He was placed on a PPI gtt for three days and transitioned to protonix 40 mg BID. On 9/17, patient received an additional unit of pRBC with an appropriate response. H/H on 9/19 was 10.2/29.2. Medications adjusted to treated hypertension. Metoprolol 25 mg PO q12hrs and systolic BP improved to 150 mmHg. Pt treated for hypernatremia with D5W on 9/18. Treated for hypophosphatemia on 9/17 and 9/19 with repletions. Patient is to follow up with Dr. Valle and with his primary care provider within 2 weeks of discharge. Medicine evaluated patient. Physical therapy evaluated pt and recommended Subacute Rehabilitation Facility.  Pt  however, is refusing subacute rehab, and will be doing home PT instead.  Pain is now well controlled.  Pt is tolerating a diet, voiding and ambulating.  Pt is ready for discharge in stable condition.  Pt will follow up with Dr. Valle, Dr. Griffin (Gastroenterologist) and his primary care provider, as an outpatient in one to two weeks. 87 y/o M with PMH CAD s/p PPM, dementia, glaucoma, hypothyroidism and parotid neoplasm presented to an urgent care on 09/05/17 for right thoracic pain S/P unwitnessed fall, and was referred to [facility] ED after being diagnosed with multiple rib fractures and acute mildly displaced fractures of the right 5th through 8th ribs and right pneumothorax. Right sided pigtail catheter was placed in the ED. CT chest abdomen and pelvis showed acute mildly displaced fractures of the right 5th through 8th ribs and interval placement of right pigtail catheter with reexpansion of the right lung. CT brain and CT c-spine were also performed at that time, as below. Patient was monitored in the SICU x 4 days, treated with Lovenox for DVT ppx and pain management. Pts brunner was not draining well d/t traumatic gross hematuria, and CBI was placed. Hematuria resolved. CBI was weened. Brunner remained in place to be assessed as outpatient with urology. Chest tube removed on 09/08/17. Pulm followed. On 9/15, H/H dropped Hct 11.6 -> 7.2 with episode of melena, followed by bloody bowel movement and LOC, Hct 6.0 given 2U pRBC, repeat Hct 8.9, brought back to SICU, hct down to 7.3 on am labs, additional 1U pRBC given. GI to perform EGD on 9/16 which revealed a small hiatus hernia, Congestive gastropathy. Biopsied, One non-bleeding duodenal ulcer with a flat pigmented spot (Ian Class IIc), Multiple non-bleeding duodenal ulcers with a clean ulcer base (Ian Class III). One non-bleeding duodenal ulcer with a nonbleeding visible vessel (Ian Class IIa). Clip (MR conditional) was placed. Normal 2nd part of the duodenum. He was placed on a PPI gtt for three days and transitioned to protonix 40 mg BID. On 9/17, patient received an additional unit of pRBC with an appropriate response. H/H on 9/19 was 10.2/29.2. Medications adjusted to treated hypertension. Metoprolol 25 mg PO q12hrs and systolic BP improved to 150 mmHg. Pt treated for hypernatremia with D5W on 9/18. Treated for hypophosphatemia on 9/17 and 9/19 with repletions. Patient is to follow up with Dr. Valle and with his primary care provider within 2 weeks of discharge. Medicine evaluated patient. Physical therapy evaluated pt and recommended Subacute Rehabilitation Facility.  Pt  however, is refusing subacute rehab, and will be doing home PT instead.  Pain is now well controlled.  Pt is tolerating a diet, voiding and ambulating.  Pt is ready for discharge in stable condition.  Pt will follow up with Dr. Valle, Dr. Griffin (Gastroenterologist) and his primary care provider, as an outpatient in one to two weeks. 87 y/o M with PMH CAD s/p PPM, dementia, glaucoma, hypothyroidism and parotid neoplasm presented to an urgent care on 09/05/17 for right thoracic pain S/P unwitnessed fall, and was referred to [facility] ED after being diagnosed with multiple rib fractures and acute mildly displaced fractures of the right 5th through 8th ribs and right pneumothorax. Right sided pigtail catheter was placed in the ED. CT chest abdomen and pelvis showed acute mildly displaced fractures of the right 5th through 8th ribs and interval placement of right pigtail catheter with reexpansion of the right lung. CT brain and CT c-spine were also performed at that time, as below. Patient was monitored in the SICU x 4 days, treated with Lovenox for DVT ppx and pain management. Pts brunner was not draining well d/t traumatic gross hematuria, and CBI was placed. Hematuria resolved. CBI was weened. Brunner remained in place to be assessed as outpatient with urology. Chest tube removed on 09/08/17. Pulm followed. On 9/15, H/H dropped Hct 11.6 -> 7.2 with episode of melena, followed by bloody bowel movement and LOC, Hct 6.0 given 2U pRBC, repeat Hct 8.9, brought back to SICU, hct down to 7.3 on am labs, additional 1U pRBC given. GI to perform EGD on 9/16 which revealed a small hiatal hernia, Congestive gastropathy. Biopsied, One non-bleeding duodenal ulcer with a flat pigmented spot (Ian Class IIc), Multiple non-bleeding duodenal ulcers with a clean ulcer base (Ian Class III). One non-bleeding duodenal ulcer with a nonbleeding visible vessel (Ian Class IIa). Clip (MR conditional) was placed. Normal 2nd part of the duodenum. He was placed on a PPI gtt for three days and transitioned to protonix 40 mg BID. On 9/17, patient received an additional unit of pRBC with an appropriate response. H/H on 9/19 was 10.2/29.2. Medications adjusted to treated hypertension. Metoprolol 25 mg PO q12hrs and systolic BP improved to 150 mmHg. Pt treated for hypernatremia with D5W on 9/18. Treated for hypophosphatemia on 9/17 and 9/19 with repletions. Patient is to follow up with Dr. Valle and with his primary care provider within 2 weeks of discharge. Medicine evaluated patient. Physical therapy evaluated pt and recommended Subacute Rehabilitation Facility.  Pt  however, is refusing subacute rehab, and will be doing home PT instead.  Pain is now well controlled.  Pt is tolerating a diet, voiding and ambulating.  Pt is ready for discharge in stable condition with home health aide arrangements made.  Pt will follow up with Dr. Kenyon, Dr. Griffin (Gastroenterologist) and his primary care provider, as an outpatient in one to two weeks. He may also follow up with outpatient psychiatry for recommendations regarding patient's agitation and dementia.

## 2017-09-08 NOTE — PROGRESS NOTE ADULT - SUBJECTIVE AND OBJECTIVE BOX
Patient is a 88y old  Male who presents with a chief complaint of unwitnessed fall, multiple rib fractures with ptx on xray (08 Sep 2017 11:31)      SUBJECTIVE / OVERNIGHT EVENTS:    pt cordial conversive confused.  no events overnight resting in bed pain is well on controlled no cp abd pain n/v/d     Vital Signs Last 24 Hrs  T(C): 36.9 (08 Sep 2017 07:50), Max: 37.5 (07 Sep 2017 15:00)  T(F): 98.4 (08 Sep 2017 07:50), Max: 99.5 (07 Sep 2017 15:00)  HR: 60 (08 Sep 2017 07:50) (60 - 99)  BP: 173/75 (08 Sep 2017 07:50) (118/57 - 173/75)  BP(mean): 97 (08 Sep 2017 06:00) (82 - 111)  RR: 20 (08 Sep 2017 07:50) (20 - 39)  SpO2: 97% (08 Sep 2017 07:50) (92% - 98%)  I&O's Summary    07 Sep 2017 07:01  -  08 Sep 2017 07:00  --------------------------------------------------------  IN: 770 mL / OUT: 920 mL / NET: -150 mL    08 Sep 2017 07:01  -  08 Sep 2017 11:51  --------------------------------------------------------  IN: 520 mL / OUT: 0 mL / NET: 520 mL            LABS:                        11.6   10.9  )-----------( 204      ( 08 Sep 2017 03:33 )             33.9     09-08    139  |  100  |  21  ----------------------------<  130<H>  3.9   |  26  |  1.11    Ca    9.8      08 Sep 2017 03:33  Phos  2.5     09-08  Mg     2.2     09-08        CAPILLARY BLOOD GLUCOSE                RADIOLOGY & ADDITIONAL TESTS:    Imaging Personally Reviewed:  [x] YES  [ ] NO    Consultant(s) Notes Reviewed:  [x] YES  [ ] NO      MEDICATIONS  (STANDING):  enoxaparin Injectable 40 milliGRAM(s) SubCutaneous every 24 hours  citalopram 10 milliGRAM(s) Oral daily  donepezil 10 milliGRAM(s) Oral at bedtime  latanoprost 0.005% Ophthalmic Solution 1 Drop(s) Both EYES at bedtime  memantine 10 milliGRAM(s) Oral every 12 hours  levothyroxine 50 MICROGram(s) Oral daily  lidocaine   Patch 1 Patch Transdermal every 24 hours  docusate sodium 100 milliGRAM(s) Oral three times a day  senna 2 Tablet(s) Oral at bedtime  tamsulosin 0.4 milliGRAM(s) Oral at bedtime  lidocaine 2% Gel 1 Application(s) Topical three times a day    MEDICATIONS  (PRN):  hydrALAZINE Injectable 10 milliGRAM(s) IV Push every 4 hours PRN Systolic greater than 180  QUEtiapine 25 milliGRAM(s) Oral at bedtime PRN aggitation      Care Discussed with Consultants/Other Providers [x] YES  [ ] NO    HEALTH ISSUES - PROBLEM Dx:

## 2017-09-08 NOTE — PROGRESS NOTE ADULT - SUBJECTIVE AND OBJECTIVE BOX
ACS Progress Note/ Transfer Note    S: Patient seen and examined. Again agitated and confused overnight, but seroquel and haldol helped. Pain well controlled with current regimen. Denies nausea/vomiting, tolerating diet.    O:  Vital Signs Last 24 Hrs  T(C): 36.9 (08 Sep 2017 07:50), Max: 37.5 (07 Sep 2017 15:00)  T(F): 98.4 (08 Sep 2017 07:50), Max: 99.5 (07 Sep 2017 15:00)  HR: 60 (08 Sep 2017 07:50) (60 - 99)  BP: 173/75 (08 Sep 2017 07:50) (118/57 - 173/75)  BP(mean): 97 (08 Sep 2017 06:00) (82 - 111)  RR: 20 (08 Sep 2017 07:50) (20 - 39)  SpO2: 97% (08 Sep 2017 07:50) (92% - 98%)    I&O's Detail    07 Sep 2017 07:01  -  08 Sep 2017 07:00  --------------------------------------------------------  IN:    dextrose 5% + sodium chloride 0.45% with potassium chloride 20 mEq/L: 200 mL    IV PiggyBack: 450 mL    Oral Fluid: 120 mL  Total IN: 770 mL    OUT:    Indwelling Catheter - Urethral: 920 mL  Total OUT: 920 mL    Total NET: -150 mL    08 Sep 2017 07:01  -  08 Sep 2017 11:22  --------------------------------------------------------  IN:    Oral Fluid: 520 mL  Total IN: 520 mL    OUT:  Total OUT: 0 mL    Total NET: 520 mL    MEDICATIONS  (STANDING):  enoxaparin Injectable 40 milliGRAM(s) SubCutaneous every 24 hours  citalopram 10 milliGRAM(s) Oral daily  donepezil 10 milliGRAM(s) Oral at bedtime  latanoprost 0.005% Ophthalmic Solution 1 Drop(s) Both EYES at bedtime  memantine 10 milliGRAM(s) Oral every 12 hours  levothyroxine 50 MICROGram(s) Oral daily  lidocaine   Patch 1 Patch Transdermal every 24 hours  docusate sodium 100 milliGRAM(s) Oral three times a day  senna 2 Tablet(s) Oral at bedtime  tamsulosin 0.4 milliGRAM(s) Oral at bedtime  lidocaine 2% Gel 1 Application(s) Topical three times a day    MEDICATIONS  (PRN):  hydrALAZINE Injectable 10 milliGRAM(s) IV Push every 4 hours PRN Systolic greater than 180  QUEtiapine 25 milliGRAM(s) Oral at bedtime PRN aggitation                        11.6   10.9  )-----------( 204      ( 08 Sep 2017 03:33 )             33.9     09-08    139  |  100  |  21  ----------------------------<  130<H>  3.9   |  26  |  1.11    Ca    9.8      08 Sep 2017 03:33  Phos  2.5     09-08  Mg     2.2     09-08    Physical Exam:  Gen: Laying in bed, NAD, patient alert, but oriented only to self  Resp: Unlabored breathing. Chest tube site dressed, dressing c/d/i.  Abd: soft, NT, ND.    Lines:   IV: patent, in place.

## 2017-09-08 NOTE — DISCHARGE NOTE ADULT - CARE PROVIDER_API CALL
Cullen Collazo), Surgery; Surgical Critical Care  [address] [id number]  Phone: [phone]  Fax: [phone]    Omar Roper  Phone: [phone]  Fax: (   )    - Cullen Collazo), Surgery; Surgical Critical Care  [address] [id number]  Phone: [phone]  Fax: [phone]    Omar Roper  Phone: [phone]  Fax: (   )    -    Hoenig, David M (MD), Urology  [facility] Dept of Urology  [address] [place] [address]  Phone: [phone]  Fax: [phone] Cullen Collazo), Surgery; Surgical Critical Care  [address] [id number]  Phone: [phone]  Fax: [phone]    Hoenig, David M (MD), Urology  [facility] Dept of Urology  [address] [place] [address]  Phone: [phone]  Fax: [phone]    Omar Roper  Phone: [phone]  Fax: (   )    -    Gary Griffin), Internal Medicine  [address] [place] [address]  Phone: [phone]  Fax: [phone] Hoenig, David M (MD), Urology  [facility] Dept of Urology  [address] [place] [address]  Phone: [phone]  Fax: [phone]    Gary Griffin), Internal Medicine  [address] [place] [address]  Phone: [phone]  Fax: [phone]    Omar Roper  Phone: [phone]  Fax: (   )    -    Albaro Kenyon), Surgery  [address] [place] [address]  Phone: [phone]  Fax: [phone]

## 2017-09-08 NOTE — PHYSICAL THERAPY INITIAL EVALUATION ADULT - PERTINENT HX OF CURRENT PROBLEM, REHAB EVAL
88M,presents to the ED with multiple R rib fractures and ptx confirmed with CXR.  Pt was taking a shower  and was found down - Son helped him up when he fell again onto his rear. Pt was c/o R sided chest pain and his family gave him aspirin and advil and ice packs.  He was still having pain the next AM so they brought him to an urgent care center where they did a CXR showing rib fractures and ptx and was sent to ED.In ED, pt was breathing comfortably on room air, pleasantly demented. cont below

## 2017-09-08 NOTE — CONSULT NOTE ADULT - ASSESSMENT
1. dementia confusion s/p fall  2 hypertension  3. possible aspiration risk  4. no acute cardiac issues    Recommend:  1. if cannot take po and using hydralazine for hypertenison would add low dose beta blocker to prevent reflex tachycardia  2. Presently there are no acute cardiac issues

## 2017-09-08 NOTE — DISCHARGE NOTE ADULT - ADDITIONAL INSTRUCTIONS
Follow up within 2 weeks with the providers below, call listed numbers to schedule appointments. You may also follow up with Outpatient Psychiatry at [facility] [phone]. Psychiatry recommends giving 0.5 mg of oral haldol up to three times a day as needed for agitation since it is less sedating than the seroquel that Mr. Goldberg came in on, a prescription has been sent to the pharmacy for you. Also, please follow up with Dr. Tariq, your PCP, for possibly resuming your beta blocker, which was stopped due to slow heart rate, and your aricept, which was stopped because it increases risk of GI bleeding.

## 2017-09-08 NOTE — PROGRESS NOTE ADULT - ASSESSMENT
89 y/o M w/ hx of CAD s/p PPM, dementia, glaucoma, hypothyroisim and parotid neoplasm who presented with R rib 5-8 fractures and pneumothorax after falling in bathtub. He is s/p R chest PTC placment with improvement of PTX. Chest tube water sealed yesterday and patient is breathing well. Read was clogged and urology was consulted overnight to irrigate.     Plan:   - Encourage IS  - PT, OOB as tolerated  - Pain control  - Holding metroprolol for bradycardia, VS q4h  - Regular diet  - seroquel at bedtime for agitation  - DVT ppx   - dispo planning: possible discharge today, waiting on PT recry Marie MD

## 2017-09-08 NOTE — PROGRESS NOTE ADULT - ASSESSMENT
Postraumatic (fall) multiple R rib fractures and large R lpneumothorax   Respansion R lung post R chest tube insertion  Dementia    REC    Mobilize with PT as tolerated  Observe off antibiotics  DVT prophylaxis  Aspiration precautions: consider swallow eval

## 2017-09-08 NOTE — PROGRESS NOTE ADULT - SUBJECTIVE AND OBJECTIVE BOX
SICU Daily Progress Note  =====================================================  Interval/Overnight Events:    Chest tube removed yesterday AM. after being placed on waterseal. Chest xray with no evidence of effusion or pneumothorax.  Patient extremely agitated overnight, even with family at bedside, given haldol 5mg x 2 after EKG with normal QTC.  As per urology, CBI discontinued, brunner left in place.    POD #  N/A        	SICU Day #    3    88M PMHx HTN, CAD s/p stent, dementia, s/p b/l TKR, L "shoulder surgery", R CEA approximately 2 years ago at [place], and resection of b/l "neck cancer" approximately 15 years ago. He presented to [facility] ED on the morning of 9/5/17 after slipping and falling getting out of the shower. He was found down after approximately 30 minutes. When his son attempted to help him up, he slipped again, landing on his back/buttocks. In the trauma bay, he was found to have a large right sided pneumothorax. A pigtail catheter was placed in the R anterior chest. A follow up chest CT showed resolution of the R PTX and acute mildly displaced fractures of the R 4-8 ribs. Admitted to SICU for observation.    Allergies: No Known Allergies      MEDICATIONS:   --------------------------------------------------------------------------------------  Neurologic Medications  citalopram 10 milliGRAM(s) Oral daily  donepezil 10 milliGRAM(s) Oral at bedtime  memantine 10 milliGRAM(s) Oral every 12 hours  QUEtiapine 25 milliGRAM(s) Oral at bedtime PRN aggitation    Respiratory Medications    Cardiovascular Medications  tamsulosin 0.4 milliGRAM(s) Oral at bedtime  hydrALAZINE Injectable 10 milliGRAM(s) IV Push every 4 hours PRN Systolic greater than 180    Gastrointestinal Medications  docusate sodium 100 milliGRAM(s) Oral three times a day  senna 2 Tablet(s) Oral at bedtime    Genitourinary Medications    Hematologic/Oncologic Medications  enoxaparin Injectable 40 milliGRAM(s) SubCutaneous every 24 hours    Antimicrobial/Immunologic Medications    Endocrine/Metabolic Medications  levothyroxine 50 MICROGram(s) Oral daily    Topical/Other Medications  latanoprost 0.005% Ophthalmic Solution 1 Drop(s) Both EYES at bedtime  lidocaine   Patch 1 Patch Transdermal every 24 hours  lidocaine 2% Gel 1 Application(s) Topical three times a day    --------------------------------------------------------------------------------------    VITAL SIGNS, INS/OUTS (last 24 hours):  --------------------------------------------------------------------------------------  ((Insert SICU Vitals/Is+Os here))***  --------------------------------------------------------------------------------------    EXAM  NEUROLOGY  RASS:   2	GCS:    Exam: Normal, NAD, alert, oriented x 0-1, appears restless  HEENT  Exam: Normocephalic, atraumatic, EOMI.    RESPIRATORY  Exam: Lungs clear to auscultation, Normal expansion/effort, currently on nasal cannula  CARDIOVASCULAR  Exam: S1, S2.  Regular rate and rhythm.    GI/NUTRITION  Exam: Abdomen soft, Non-tender, Non-distended.   CT site: c/D/i no evidence of leakage  VASCULAR  Exam: Extremities warm, pink, well-perfused.   MUSCULOSKELETAL  Exam: All extremities moving spontaneously without limitations.  SKIN  Exam: Good skin turgor, no skin breakdown.    METABOLIC/FLUIDS/ELECTROLYTES      HEMATOLOGIC  [x] VTE Prophylaxis: enoxaparin Injectable 40 milliGRAM(s) SubCutaneous every 24 hours    Transfusions:	[] PRBC	[] Platelets		[] FFP	[] Cryoprecipitate    INFECTIOUS DISEASE  Antimicrobials/Immunologic Medications:    Day #      of     ***    Tubes/Lines/Drains  ***  [x] Peripheral IV  [] Central Venous Line     	[] R	[] L	[] IJ	[] Fem	[] SC	Date Placed:   [] Arterial Line		[] R	[] L	[] Fem	[] Rad	[] Ax	Date Placed:   [] PICC		[] Midline		[] Mediport  [] Urinary Catheter		Date Placed:   [x] Necessity of urinary, arterial, and venous catheters discussed    LABS  --------------------------------------------------------------------------------------  ((Insert SICU Labs here))***  --------------------------------------------------------------------------------------    OTHER LABORATORY:     IMAGING STUDIES:   CXR:

## 2017-09-08 NOTE — PROGRESS NOTE ADULT - SUBJECTIVE AND OBJECTIVE BOX
Subjective  Continuing agitation overnight    Objective  Vital signs  T(F): , Max: 98.8 (09-07-17 @ 23:00)  HR: 69 (09-08-17 @ 12:31)  BP: 146/71 (09-08-17 @ 12:31)  SpO2: 99% (09-08-17 @ 12:31)  Read 275 - clear yellow    Gen: NAD  Abd: Soft, non-tender  : Read secure, draining as above    Labs  09-08 @ 03:33    WBC 10.9  / Hct 33.9  / SCr 1.11     09-07 @ 05:23    WBC 12.7  / Hct 35.1  / SCr 1.06

## 2017-09-08 NOTE — PHYSICAL THERAPY INITIAL EVALUATION ADULT - GENERAL OBSERVATIONS, REHAB EVAL
Pt received semi-supine in bed +2L O2 NC, IV lock, brunner catheter, 1:1 at bedside for safety in NAD

## 2017-09-08 NOTE — PROGRESS NOTE ADULT - ASSESSMENT
89 y/o M w/ hx of HTN HLD CAD s/p PPM, dementia aaox 1-2 at baseline, glaucoma, hypothyroidism and parotid neoplasm who presented s/p fall in the bathtub found to have  R rib 5-8 fractures and pneumothorax s/p chest tube and removal.   Pneumothorax resolved.      Rib Fx / pneumothorax chest tube out repeat cxr no pneumothorax.  pain controlled     Bradycardia  BB stopped by primary team      Bladder wall thickening - consider UA/UC discussed with surgery team r/o UTI in setting of fall in elderly demented pt    HTN HLD CAD s/p PPM - bp controlled monitor c/w telemetry     Dementia - c/w namenda seroquel PRN     DVT PPX

## 2017-09-08 NOTE — DISCHARGE NOTE ADULT - MEDICATION SUMMARY - MEDICATIONS TO STOP TAKING
I will STOP taking the medications listed below when I get home from the hospital:    metoprolol succinate 50 mg oral tablet, extended release  -- 1 tab(s) by mouth once a day I will STOP taking the medications listed below when I get home from the hospital:    metoprolol succinate 50 mg oral tablet, extended release  -- 1 tab(s) by mouth once a day    donepezil 10 mg oral tablet  -- 1 tab(s) by mouth once a day (at bedtime)

## 2017-09-08 NOTE — DISCHARGE NOTE ADULT - REASON FOR ADMISSION
unwitnessed fall, multiple rib fractures with ptx on xray unwitnessed fall, multiple rib fractures with ptx on xray. Hospital stay complicated by upper GI bleed

## 2017-09-08 NOTE — DISCHARGE NOTE ADULT - MEDICATION SUMMARY - MEDICATIONS TO TAKE
I will START or STAY ON the medications listed below when I get home from the hospital:    tamsulosin 0.4 mg oral capsule  -- 1 cap(s) by mouth once a day (at bedtime)  -- Indication: For BPH    citalopram 10 mg oral tablet  -- 1 tab(s) by mouth once a day  -- Indication: For mood    QUEtiapine 25 mg oral tablet  -- 1 tab(s) by mouth once a day (at bedtime), As needed, aggitation  -- Indication: For agitation    donepezil 10 mg oral tablet  -- 1 tab(s) by mouth once a day (at bedtime)  -- Indication: For dementia    lidocaine 5% topical film  -- Apply on skin to affected area once a day  -- Indication: For Rib fractures    docusate sodium 100 mg oral capsule  -- 1 cap(s) by mouth 3 times a day  -- Indication: For Constipation    senna oral tablet  -- 2 tab(s) by mouth once a day (at bedtime)  -- Indication: For Constipation    Namenda XR 28 mg oral capsule, extended release  -- 1 cap(s) by mouth once a day  -- Indication: For Dementia    latanoprost 0.005% ophthalmic solution  -- 1 drop(s) to each affected eye once a day (at bedtime)  -- Indication: For Glaucoma    levothyroxine 50 mcg (0.05 mg) oral tablet  -- 1 tab(s) by mouth once a day  -- Indication: For Hypothyroidism I will START or STAY ON the medications listed below when I get home from the hospital:    acetaminophen 325 mg oral tablet  -- 1 tab(s) by mouth every 4 hours, As needed, Mild Pain (1 - 3)  -- Indication: For Pain    tamsulosin 0.4 mg oral capsule  -- 1 cap(s) by mouth once a day (at bedtime) MDD:1  -- Indication: For Urinary    citalopram 10 mg oral tablet  -- 1 tab(s) by mouth once a day  -- Indication: For mood    QUEtiapine 25 mg oral tablet  -- 1 tab(s) by mouth once a day (at bedtime), As needed, aggitation MDD:1  -- Indication: For mood    donepezil 10 mg oral tablet  -- 1 tab(s) by mouth once a day (at bedtime)  -- Indication: For dementia    lidocaine 5% topical film  -- Apply on skin to affected area once a day  -- Indication: For Rib fractures    docusate sodium 100 mg oral capsule  -- 1 cap(s) by mouth 3 times a day  -- Indication: For Constipation    senna oral tablet  -- 2 tab(s) by mouth once a day (at bedtime)  -- Indication: For Constipation    Namenda XR 28 mg oral capsule, extended release  -- 1 cap(s) by mouth once a day  -- Indication: For Dementia    latanoprost 0.005% ophthalmic solution  -- 1 drop(s) to each affected eye once a day (at bedtime)  -- Indication: For Glaucoma    levothyroxine 50 mcg (0.05 mg) oral tablet  -- 1 tab(s) by mouth once a day  -- Indication: For Hypothyroidism I will START or STAY ON the medications listed below when I get home from the hospital:    acetaminophen 325 mg oral tablet  -- 1 tab(s) by mouth every 4 hours, As needed, Mild Pain (1 - 3)  -- Indication: For Pain    tamsulosin 0.4 mg oral capsule  -- 1 cap(s) by mouth once a day (at bedtime) MDD:1  -- Indication: For BPH    citalopram 10 mg oral tablet  -- 1 tab(s) by mouth once a day  -- Indication: For mood    haloperidol 0.5 mg oral tablet  -- 1 tab(s) by mouth 3 times a day, As Needed -for agitation   -- It is very important that you take or use this exactly as directed.  Do not skip doses or discontinue unless directed by your doctor.  May cause drowsiness.  Alcohol may intensify this effect.  Use care when operating dangerous machinery.  Obtain medical advice before taking any non-prescription drugs as some may affect the action of this medication.    -- Indication: For Agitation/Dementia    lidocaine 5% topical film  -- Apply on skin to affected area once a day  -- Indication: For Rib fractures    docusate sodium 100 mg oral capsule  -- 1 cap(s) by mouth 3 times a day  -- Indication: For Constipation    senna oral tablet  -- 2 tab(s) by mouth once a day (at bedtime)  -- Indication: For Constipation    Namenda XR 28 mg oral capsule, extended release  -- 1 cap(s) by mouth once a day  -- Indication: For Dementia    latanoprost 0.005% ophthalmic solution  -- 1 drop(s) to each affected eye once a day (at bedtime)  -- Indication: For Glaucoma    pantoprazole 40 mg oral granule, enteric coated  -- 1  by mouth 2 times a day  -- Indication: For GI bleed    levothyroxine 50 mcg (0.05 mg) oral tablet  -- 1 tab(s) by mouth once a day  -- Indication: For Hypothyroidism I will START or STAY ON the medications listed below when I get home from the hospital:    acetaminophen 325 mg oral tablet  -- 1 tab(s) by mouth every 4 hours, As needed, Mild Pain (1 - 3)  -- Indication: For Pain    tamsulosin 0.4 mg oral capsule  -- 1 cap(s) by mouth once a day (at bedtime) MDD:1  -- Indication: For Urinary retention    citalopram 10 mg oral tablet  -- 1 tab(s) by mouth once a day  -- Indication: For mood    haloperidol 0.5 mg oral tablet  -- 1 tab(s) by mouth 3 times a day, As Needed -for agitation   -- It is very important that you take or use this exactly as directed.  Do not skip doses or discontinue unless directed by your doctor.  May cause drowsiness.  Alcohol may intensify this effect.  Use care when operating dangerous machinery.  Obtain medical advice before taking any non-prescription drugs as some may affect the action of this medication.    -- Indication: For Agitation/Dementia    lidocaine 5% topical film  -- Apply on skin to affected area once a day  -- Indication: For Rib fractures    docusate sodium 100 mg oral capsule  -- 1 cap(s) by mouth 3 times a day  -- Indication: For Constipation    senna oral tablet  -- 2 tab(s) by mouth once a day (at bedtime)  -- Indication: For Constipation    Namenda XR 28 mg oral capsule, extended release  -- 1 cap(s) by mouth once a day  -- Indication: For Dementia    latanoprost 0.005% ophthalmic solution  -- 1 drop(s) to each affected eye once a day (at bedtime)  -- Indication: For Glaucoma    pantoprazole 40 mg oral granule, enteric coated  -- 1 granules by mouth 2 times a day   -- Indication: For Reflux    levothyroxine 50 mcg (0.05 mg) oral tablet  -- 1 tab(s) by mouth once a day  -- Indication: For Hypothyroidism

## 2017-09-08 NOTE — DISCHARGE NOTE ADULT - DURABLE MEDICAL EQUIPMENT AGENCY
[facility] surgical for a 3:1 commode and RW for delivery to patient's home  [facility] for a 3:1 commode , wheel chair and RW for delivery to patient's home    /  [phone] [facility] surgical for a 3:1 commode  and RW for delivery to patient's home    /  [phone]

## 2017-09-08 NOTE — DISCHARGE NOTE ADULT - HOME CARE AGENCY
[facility] care for home P/T and evaluation for a [facility]   [phone]  Home care will call for an appointment day after discharge.

## 2017-09-08 NOTE — PROGRESS NOTE ADULT - SUBJECTIVE AND OBJECTIVE BOX
Follow-up Pulm Progress Note  Stefan Vides MD  [phone]    No new respiratory events overnight.   AFebrile and hemodynamically stable  Sleepy OOB in NAD, rousable and confused  CXR: HAZEL, no PTX or effusion    Vital Signs Last 24 Hrs  T(C): 36.8 (08 Sep 2017 12:09), Max: 37.5 (07 Sep 2017 15:00)  T(F): 98.3 (08 Sep 2017 12:09), Max: 99.5 (07 Sep 2017 15:00)  HR: 69 (08 Sep 2017 12:31) (60 - 99)  BP: 146/71 (08 Sep 2017 12:31) (118/57 - 173/75)  BP(mean): 97 (08 Sep 2017 06:00) (82 - 111)  RR: 19 (08 Sep 2017 12:09) (19 - 39)  SpO2: 99% (08 Sep 2017 12:31) (92% - 99%)    ABG - ( 07 Sep 2017 16:24 )  pH: 7.48  /  pCO2: 31    /  pO2: 149   / HCO3: 23    / Base Excess: .3    /  SaO2: 100                            11.6   10.9  )-----------( 204      ( 08 Sep 2017 03:33 )             33.9       09-08    139  |  100  |  21  ----------------------------<  130<H>  3.9   |  26  |  1.11    Ca    9.8      08 Sep 2017 03:33  Phos  2.5     09-08  Mg     2.2     09-08      Physical Examination:  PULM: Without wheeze or rhonchi  CVS: Regular rate and rhythm, no murmurs, rubs, or gallops  ABD: Soft, non-tender  EXT:  No clubbing, cyanosis, or edema    RADIOLOGY REVIEWED  CXR:    CT chest:    TTE:

## 2017-09-08 NOTE — DISCHARGE NOTE ADULT - CARE PROVIDERS DIRECT ADDRESSES
,[email or web address],DirectAddress_Unknown,[email or web address],DirectAddress_Unknown,[email or web address] ,[email or web address],DirectAddress_Unknown,DirectAddress_Unknown,[email or web address]

## 2017-09-08 NOTE — DISCHARGE NOTE ADULT - PLAN OF CARE
Return to pre-admission level of function Follow up with providers listed below, within 2 weeks of discharge. Activity as tolerated. No changes to diet. follow up Dr. Collazo, Surgery, in 7 to 10 days.  Call [phone] for appointment. Please follow up with your Primary Care Physician regarding your hospitalization, and schedule an appointment within two weeks after your discharge to review your hospital course.  Please  review all your current medications, and dose adjust as prescribed by your Primary Care Physician.  Call their office for appointment. Dr. Hoenig, Urology, in one to two weeks.  Call their office [phone], for appointment. Dr. Valle, Surgery, in 7 to 10 days.  Call [phone] for appointment.  [address]  [place], [address] Dr. Hoenig, Urology, in one to two weeks.  Call their office [phone], for appointment. Follow up with Dr. Griffin (Gastroenterologist) in 1 week to follow up biopsy results. Please call to schedule an appointment. Dr. Kenyon, Surgery, in 7 to 10 days.  Call [phone] for appointment.  [address]  [address]  [place], [address] Follow up with Dr. Griffin (Gastroenterologist) in 1 week to follow up biopsy results. Please call to schedule an appointment. Will continue PO protonix as outpatient for GI health.

## 2017-09-08 NOTE — DISCHARGE NOTE ADULT - PROVIDER TOKENS
TOKEN:'[id number]',FREE:[LAST:[Jacquelin],FIRST:[Omar],PHONE:[phone]],FAX:[(   )    -]] TOKEN:'[id number]',FREE:[LAST:[Jacquelin],FIRST:[Omar],PHONE:[phone]],FAX:[(   )    -]],TOKEN:'[id number]' TOKEN:'[id number]',TOKEN:'[id number]',FREE:[LAST:[Jacquelin],FIRST:[Omar],PHONE:[phone]],FAX:[(   )    -]],TOKEN:'[id number]' TOKEN:'[id number]',TOKEN:'[id number]',FREE:[LAST:[Jacquelin],FIRST:[Omar],PHONE:[phone]],FAX:[(   )    -]],TOKEN:'[id number]'

## 2017-09-08 NOTE — PROGRESS NOTE ADULT - ATTENDING COMMENTS
Multiple R rib fractures  - multimodal pain control  - encourage pulm toilet  - on exam, pt with some upper airway sounds. lungs otherwise clear to auscultation.    R traumatic pneumothorax  - resolved, chest tube out    Hematuria  - brunner in place, off CBI now  - f/u urology recs re: duration of brunner    Dementia with superimposed delirium/agitation  - seroquel HS. transferred out of ICU, hopefully will sleep better on floor and MS will improve  - encourage good sleep hygine

## 2017-09-08 NOTE — DISCHARGE NOTE ADULT - CARE PLAN
Principal Discharge DX:	Rib fractures  Goal:	Return to pre-admission level of function  Instructions for follow-up, activity and diet:	Follow up with providers listed below, within 2 weeks of discharge. Activity as tolerated. No changes to diet.  Secondary Diagnosis:	Pneumothorax Principal Discharge DX:	Rib fractures  Goal:	Return to pre-admission level of function  Instructions for follow-up, activity and diet:	Follow up with providers listed below, within 2 weeks of discharge. Activity as tolerated. No changes to diet.  Secondary Diagnosis:	Pneumothorax  Goal:	follow up  Instructions for follow-up, activity and diet:	Dr. Collazo, Surgery, in 7 to 10 days.  Call [phone] for appointment.  Secondary Diagnosis:	Carotid artery disease  Goal:	follow up  Instructions for follow-up, activity and diet:	Please follow up with your Primary Care Physician regarding your hospitalization, and schedule an appointment within two weeks after your discharge to review your hospital course.  Please  review all your current medications, and dose adjust as prescribed by your Primary Care Physician.  Call their office for appointment.  Secondary Diagnosis:	Hypothyroid  Goal:	follow up  Instructions for follow-up, activity and diet:	Please follow up with your Primary Care Physician regarding your hospitalization, and schedule an appointment within two weeks after your discharge to review your hospital course.  Please  review all your current medications, and dose adjust as prescribed by your Primary Care Physician.  Call their office for appointment.  Secondary Diagnosis:	Hematuria  Goal:	follow up  Instructions for follow-up, activity and diet:	Dr. Hoenig, Urology, in one to two weeks.  Call their office [phone], for appointment. Principal Discharge DX:	Rib fractures  Goal:	Return to pre-admission level of function  Instructions for follow-up, activity and diet:	Follow up with providers listed below, within 2 weeks of discharge. Activity as tolerated. No changes to diet.  Secondary Diagnosis:	Pneumothorax  Goal:	follow up  Instructions for follow-up, activity and diet:	Dr. Valle, Surgery, in 7 to 10 days.  Call [phone] for appointment.  [address] [facility] [address]  [address]  [place], [address]  Secondary Diagnosis:	Hematuria  Goal:	follow up  Instructions for follow-up, activity and diet:	Dr. Hoenig, Urology, in one to two weeks.  Call their office [phone], for appointment.  Secondary Diagnosis:	GI bleed  Goal:	follow up  Instructions for follow-up, activity and diet:	Follow up with Dr. Griffin (Gastroenterologist) in 1 week to follow up biopsy results. Please call to schedule an appointment. Principal Discharge DX:	Rib fractures  Goal:	Return to pre-admission level of function  Instructions for follow-up, activity and diet:	Follow up with providers listed below, within 2 weeks of discharge. Activity as tolerated. No changes to diet.  Secondary Diagnosis:	Pneumothorax  Goal:	follow up  Instructions for follow-up, activity and diet:	Dr. Kenyno, Surgery, in 7 to 10 days.  Call [phone] for appointment.  [address]  [address]  [place], [address]  Secondary Diagnosis:	Hematuria  Goal:	follow up  Instructions for follow-up, activity and diet:	Dr. Hoenig, Urology, in one to two weeks.  Call their office [phone], for appointment.  Secondary Diagnosis:	GI bleed  Goal:	follow up  Instructions for follow-up, activity and diet:	Follow up with Dr. Griffin (Gastroenterologist) in 1 week to follow up biopsy results. Please call to schedule an appointment. Will continue PO protonix as outpatient for GI health.

## 2017-09-08 NOTE — PHYSICAL THERAPY INITIAL EVALUATION ADULT - ADDITIONAL COMMENTS
As per care coordination note pt lives in a private home with spouse, son and daughter in law. Pt lives on first floor and has one step to enter. States pt was independent prior to admission and had assist for ADLs from family as needed. Pt has HHA 1 day/week.

## 2017-09-09 PROCEDURE — 99232 SBSQ HOSP IP/OBS MODERATE 35: CPT

## 2017-09-09 RX ORDER — ACETAMINOPHEN 500 MG
1 TABLET ORAL
Qty: 0 | Refills: 0 | COMMUNITY
Start: 2017-09-09

## 2017-09-09 RX ORDER — TAMSULOSIN HYDROCHLORIDE 0.4 MG/1
1 CAPSULE ORAL
Qty: 30 | Refills: 0 | OUTPATIENT
Start: 2017-09-09 | End: 2017-10-09

## 2017-09-09 RX ORDER — QUETIAPINE FUMARATE 200 MG/1
1 TABLET, FILM COATED ORAL
Qty: 14 | Refills: 0 | OUTPATIENT
Start: 2017-09-09 | End: 2017-09-23

## 2017-09-09 RX ADMIN — LIDOCAINE 1 PATCH: 4 CREAM TOPICAL at 01:30

## 2017-09-09 RX ADMIN — CITALOPRAM 10 MILLIGRAM(S): 10 TABLET, FILM COATED ORAL at 11:59

## 2017-09-09 RX ADMIN — SENNA PLUS 2 TABLET(S): 8.6 TABLET ORAL at 21:43

## 2017-09-09 RX ADMIN — LATANOPROST 1 DROP(S): 0.05 SOLUTION/ DROPS OPHTHALMIC; TOPICAL at 21:44

## 2017-09-09 RX ADMIN — MEMANTINE HYDROCHLORIDE 10 MILLIGRAM(S): 10 TABLET ORAL at 17:58

## 2017-09-09 RX ADMIN — Medication 50 MICROGRAM(S): at 07:00

## 2017-09-09 RX ADMIN — DONEPEZIL HYDROCHLORIDE 10 MILLIGRAM(S): 10 TABLET, FILM COATED ORAL at 21:43

## 2017-09-09 RX ADMIN — LIDOCAINE 1 PATCH: 4 CREAM TOPICAL at 11:59

## 2017-09-09 RX ADMIN — MEMANTINE HYDROCHLORIDE 10 MILLIGRAM(S): 10 TABLET ORAL at 07:00

## 2017-09-09 RX ADMIN — ENOXAPARIN SODIUM 40 MILLIGRAM(S): 100 INJECTION SUBCUTANEOUS at 11:59

## 2017-09-09 RX ADMIN — TAMSULOSIN HYDROCHLORIDE 0.4 MILLIGRAM(S): 0.4 CAPSULE ORAL at 21:43

## 2017-09-09 RX ADMIN — QUETIAPINE FUMARATE 25 MILLIGRAM(S): 200 TABLET, FILM COATED ORAL at 21:43

## 2017-09-09 NOTE — PROGRESS NOTE ADULT - SUBJECTIVE AND OBJECTIVE BOX
Follow-up Pulm Progress Note  Stefan Vides MD  [phone]    CXR 9/8 clear with R lung re explanded: HAZEL  AFebrile and hemodynamically stable  Intermittent agitation - [place]  Lethargic today, barely rousable: R arm tremors noted intermittently      Vital Signs Last 24 Hrs  T(C): 36.8 (08 Sep 2017 12:09), Max: 37.5 (07 Sep 2017 15:00)  T(F): 98.3 (08 Sep 2017 12:09), Max: 99.5 (07 Sep 2017 15:00)  HR: 69 (08 Sep 2017 12:31) (60 - 99)  BP: 146/71 (08 Sep 2017 12:31) (118/57 - 173/75)  BP(mean): 97 (08 Sep 2017 06:00) (82 - 111)  RR: 19 (08 Sep 2017 12:09) (19 - 39)  SpO2: 99% (08 Sep 2017 12:31) (92% - 99%)    ABG - ( 07 Sep 2017 16:24 )  pH: 7.48  /  pCO2: 31    /  pO2: 149   / HCO3: 23    / Base Excess: .3    /  SaO2: 100                            11.6   10.9  )-----------( 204      ( 08 Sep 2017 03:33 )             33.9       09-08    139  |  100  |  21  ----------------------------<  130<H>  3.9   |  26  |  1.11    Ca    9.8      08 Sep 2017 03:33  Phos  2.5     09-08  Mg     2.2     09-08      Physical Examination:  PULM: Few rhonchi  CVS: Regular rate and rhythm, no murmurs, rubs, or gallops  ABD: Soft, non-tender  EXT:  No clubbing, cyanosis, or edema    RADIOLOGY REVIEWED  CXR:    CT chest:    TTE:

## 2017-09-09 NOTE — PROGRESS NOTE ADULT - SUBJECTIVE AND OBJECTIVE BOX
ACS Progress Note/ Transfer Note    S: Patient was asleep. Patient was reported to be awake the whole night, and self-DCed brunner catheter. Patient was able to void on his own afterward.     O:  Vital Signs Last 24 Hrs  T(C): 36.9 (08 Sep 2017 07:50), Max: 37.5 (07 Sep 2017 15:00)  T(F): 98.4 (08 Sep 2017 07:50), Max: 99.5 (07 Sep 2017 15:00)  HR: 60 (08 Sep 2017 07:50) (60 - 99)  BP: 173/75 (08 Sep 2017 07:50) (118/57 - 173/75)  BP(mean): 97 (08 Sep 2017 06:00) (82 - 111)  RR: 20 (08 Sep 2017 07:50) (20 - 39)  SpO2: 97% (08 Sep 2017 07:50) (92% - 98%)    I&O's Detail    07 Sep 2017 07:01  -  08 Sep 2017 07:00  --------------------------------------------------------  IN:    dextrose 5% + sodium chloride 0.45% with potassium chloride 20 mEq/L: 200 mL    IV PiggyBack: 450 mL    Oral Fluid: 120 mL  Total IN: 770 mL    OUT:    Indwelling Catheter - Urethral: 920 mL  Total OUT: 920 mL    Total NET: -150 mL    08 Sep 2017 07:01  -  08 Sep 2017 11:22  --------------------------------------------------------  IN:    Oral Fluid: 520 mL  Total IN: 520 mL    OUT:  Total OUT: 0 mL    Total NET: 520 mL    MEDICATIONS  (STANDING):  enoxaparin Injectable 40 milliGRAM(s) SubCutaneous every 24 hours  citalopram 10 milliGRAM(s) Oral daily  donepezil 10 milliGRAM(s) Oral at bedtime  latanoprost 0.005% Ophthalmic Solution 1 Drop(s) Both EYES at bedtime  memantine 10 milliGRAM(s) Oral every 12 hours  levothyroxine 50 MICROGram(s) Oral daily  lidocaine   Patch 1 Patch Transdermal every 24 hours  docusate sodium 100 milliGRAM(s) Oral three times a day  senna 2 Tablet(s) Oral at bedtime  tamsulosin 0.4 milliGRAM(s) Oral at bedtime  lidocaine 2% Gel 1 Application(s) Topical three times a day    MEDICATIONS  (PRN):  hydrALAZINE Injectable 10 milliGRAM(s) IV Push every 4 hours PRN Systolic greater than 180  QUEtiapine 25 milliGRAM(s) Oral at bedtime PRN aggitation                        11.6   10.9  )-----------( 204      ( 08 Sep 2017 03:33 )             33.9     09-08    139  |  100  |  21  ----------------------------<  130<H>  3.9   |  26  |  1.11    Ca    9.8      08 Sep 2017 03:33  Phos  2.5     09-08  Mg     2.2     09-08    Physical Exam:  Gen: Laying in bed, asleep, didn't wake up patient was he was kept up all night due to brunner issues.     Lines:   IV: patent, in place.

## 2017-09-09 NOTE — PROGRESS NOTE ADULT - ATTENDING COMMENTS
Multiple R rib fractures  - multimodal pain control  - encourage pulm toilet  - on exam, pt with some upper airway sounds. lungs otherwise clear to auscultation.    R traumatic pneumothorax  - resolved, chest tube out    Hematuria  - resolved, brunner out    Dementia with superimposed delirium/agitation  - seroquel HS  - aspiration precautions  - dysphagia diet

## 2017-09-09 NOTE — PROGRESS NOTE ADULT - ASSESSMENT
Postraumatic (fall) multiple R rib fractures and large R lpneumothorax   Respansion R lung post R chest tube insertion  Dementia with agitation and lethargy: high aspiration risk   R arm tremors - unclear etiology    REC    Aspiration precautions: consider swallow eval  Consider dysphagia diet until swallow eval  Neuro eval if R sided tremors persist  Mobilize with PT as tolerated  Observe off antibiotics  DVT prophylaxis

## 2017-09-09 NOTE — PROGRESS NOTE ADULT - ASSESSMENT
87 y/o M w/ hx of CAD s/p PPM, dementia, glaucoma, hypothyroisim and parotid neoplasm who presented with R rib 5-8 fractures and pneumothorax after falling in bathtub. Patient self d/lowell brnuner yesterday and was able to void on his own.     Plan:   - Monitor UOP after brunner removal   - Encourage IS  - PT, OOB as tolerated  - Pain control  - Holding metroprolol for bradycardia, VS q4h  - Regular diet  - seroquel at bedtime for agitation  - DVT ppx   - Patient/family refers home with PT.

## 2017-09-10 PROCEDURE — 99232 SBSQ HOSP IP/OBS MODERATE 35: CPT

## 2017-09-10 RX ORDER — QUETIAPINE FUMARATE 200 MG/1
25 TABLET, FILM COATED ORAL ONCE
Qty: 0 | Refills: 0 | Status: COMPLETED | OUTPATIENT
Start: 2017-09-10 | End: 2017-09-11

## 2017-09-10 RX ADMIN — MEMANTINE HYDROCHLORIDE 10 MILLIGRAM(S): 10 TABLET ORAL at 20:14

## 2017-09-10 RX ADMIN — LIDOCAINE 1 PATCH: 4 CREAM TOPICAL at 12:17

## 2017-09-10 RX ADMIN — LIDOCAINE 1 PATCH: 4 CREAM TOPICAL at 00:01

## 2017-09-10 RX ADMIN — ENOXAPARIN SODIUM 40 MILLIGRAM(S): 100 INJECTION SUBCUTANEOUS at 12:17

## 2017-09-10 RX ADMIN — DONEPEZIL HYDROCHLORIDE 10 MILLIGRAM(S): 10 TABLET, FILM COATED ORAL at 23:12

## 2017-09-10 RX ADMIN — TAMSULOSIN HYDROCHLORIDE 0.4 MILLIGRAM(S): 0.4 CAPSULE ORAL at 23:12

## 2017-09-10 RX ADMIN — CITALOPRAM 10 MILLIGRAM(S): 10 TABLET, FILM COATED ORAL at 11:16

## 2017-09-10 RX ADMIN — LATANOPROST 1 DROP(S): 0.05 SOLUTION/ DROPS OPHTHALMIC; TOPICAL at 23:12

## 2017-09-10 RX ADMIN — Medication 50 MICROGRAM(S): at 09:19

## 2017-09-10 RX ADMIN — MEMANTINE HYDROCHLORIDE 10 MILLIGRAM(S): 10 TABLET ORAL at 09:19

## 2017-09-10 RX ADMIN — QUETIAPINE FUMARATE 25 MILLIGRAM(S): 200 TABLET, FILM COATED ORAL at 23:12

## 2017-09-10 NOTE — PROGRESS NOTE ADULT - ASSESSMENT
89 y/o M w/ hx of CAD s/p PPM, dementia, glaucoma, hypothyroisim and parotid neoplasm who presented with R rib 5-8 fractures and pneumothorax after falling in bathtub. Patient has been having severe sundowning and becomes very agitated at night.     Plan:   - PT, OOB as tolerated  - Pain control  - Holding metroprolol for bradycardia, VS q4h  - Regular diet  - seroquel at bedtime for agitation, haldol PRN  - DVT ppx   - On constant obs, can try enhanced obs once patient is in bed closer to nursing station  - Dispo: family wants [facility], which was recommended by PT    Antwon Marie MD

## 2017-09-10 NOTE — PROGRESS NOTE ADULT - SUBJECTIVE AND OBJECTIVE BOX
ACS Progress Note    S: Patient seen and examined. Sundowned again last night, but this time was up walking the halls with PCA using rolling walker. Patient has no acute complaints, denies pain. He was very pleasant this afternoon watching the [employer] game. Wife was at bedside, says she would like him to go to rehab. 1:1 was discontinued temporarily, but had to be put back on because the patient became agitated in the evening.     O:  Vital Signs Last 24 Hrs  T(C): 37 (10 Sep 2017 19:58), Max: 37 (10 Sep 2017 19:58)  T(F): 98.6 (10 Sep 2017 19:58), Max: 98.6 (10 Sep 2017 19:58)  HR: 69 (10 Sep 2017 19:58) (63 - 69)  BP: 156/69 (10 Sep 2017 19:58) (154/76 - 171/73)  BP(mean): --  RR: 20 (10 Sep 2017 19:58) (18 - 20)  SpO2: 95% (10 Sep 2017 19:58) (93% - 95%)    I&O's Detail    09 Sep 2017 07:01  -  10 Sep 2017 07:00  --------------------------------------------------------  IN:    Oral Fluid: 580 mL  Total IN: 580 mL    OUT:  Total OUT: 0 mL    Total NET: 580 mL    10 Sep 2017 07:01  -  10 Sep 2017 21:02  --------------------------------------------------------  IN:    Oral Fluid: 350 mL  Total IN: 350 mL    OUT:  Total OUT: 0 mL    Total NET: 350 mL    MEDICATIONS  (STANDING):  enoxaparin Injectable 40 milliGRAM(s) SubCutaneous every 24 hours  citalopram 10 milliGRAM(s) Oral daily  donepezil 10 milliGRAM(s) Oral at bedtime  latanoprost 0.005% Ophthalmic Solution 1 Drop(s) Both EYES at bedtime  memantine 10 milliGRAM(s) Oral every 12 hours  levothyroxine 50 MICROGram(s) Oral daily  lidocaine   Patch 1 Patch Transdermal every 24 hours  docusate sodium 100 milliGRAM(s) Oral three times a day  senna 2 Tablet(s) Oral at bedtime  tamsulosin 0.4 milliGRAM(s) Oral at bedtime  QUEtiapine 25 milliGRAM(s) Oral at bedtime    MEDICATIONS  (PRN):  hydrALAZINE Injectable 10 milliGRAM(s) IV Push every 4 hours PRN Systolic greater than 180  acetaminophen   Tablet. 325 milliGRAM(s) Oral every 4 hours PRN Mild Pain (1 - 3)    Physical Exam:  Gen: Laying in bed, NAD. Oriented to self only.  Resp: Unlabored breathing  Abd: soft, NT, ND    Lines:   IV: patent, in place.

## 2017-09-10 NOTE — PROGRESS NOTE ADULT - ATTENDING COMMENTS
Multiple R rib fractures  - multimodal pain control  - encourage pulm toilet    R traumatic pneumothorax  - resolved, chest tube out    Hematuria  - resolved, brunner out    Dementia with superimposed delirium/agitation  - seroquel HS (increase dose tonight as patient is still not sleeping)  - aspiration precautions  - dysphagia diet

## 2017-09-11 LAB
APPEARANCE UR: ABNORMAL
BACTERIA # UR AUTO: NEGATIVE — SIGNIFICANT CHANGE UP
BILIRUB UR-MCNC: NEGATIVE — SIGNIFICANT CHANGE UP
COLOR SPEC: ABNORMAL
DIFF PNL FLD: ABNORMAL
EPI CELLS # UR: 0 /HPF — SIGNIFICANT CHANGE UP (ref 0–5)
GLUCOSE UR QL: NEGATIVE MG/DL — SIGNIFICANT CHANGE UP
KETONES UR-MCNC: ABNORMAL
LEUKOCYTE ESTERASE UR-ACNC: ABNORMAL
NITRITE UR-MCNC: NEGATIVE — SIGNIFICANT CHANGE UP
PH UR: 5.5 — SIGNIFICANT CHANGE UP (ref 5–8)
PROT UR-MCNC: 100 MG/DL
RBC CASTS # UR COMP ASSIST: 118 /HPF — HIGH (ref 0–4)
SP GR SPEC: 1.03 — HIGH (ref 1.01–1.02)
UROBILINOGEN FLD QL: 1 MG/DL — SIGNIFICANT CHANGE UP
WBC UR QL: 53 /HPF — HIGH (ref 0–5)

## 2017-09-11 PROCEDURE — 99233 SBSQ HOSP IP/OBS HIGH 50: CPT

## 2017-09-11 PROCEDURE — 93010 ELECTROCARDIOGRAM REPORT: CPT

## 2017-09-11 RX ORDER — THIAMINE MONONITRATE (VIT B1) 100 MG
100 TABLET ORAL DAILY
Qty: 0 | Refills: 0 | Status: DISCONTINUED | OUTPATIENT
Start: 2017-09-11 | End: 2017-09-20

## 2017-09-11 RX ORDER — IPRATROPIUM/ALBUTEROL SULFATE 18-103MCG
3 AEROSOL WITH ADAPTER (GRAM) INHALATION THREE TIMES A DAY
Qty: 0 | Refills: 0 | Status: DISCONTINUED | OUTPATIENT
Start: 2017-09-11 | End: 2017-09-20

## 2017-09-11 RX ADMIN — TAMSULOSIN HYDROCHLORIDE 0.4 MILLIGRAM(S): 0.4 CAPSULE ORAL at 22:03

## 2017-09-11 RX ADMIN — SENNA PLUS 2 TABLET(S): 8.6 TABLET ORAL at 22:03

## 2017-09-11 RX ADMIN — Medication 3 MILLILITER(S): at 13:26

## 2017-09-11 RX ADMIN — Medication 3 MILLILITER(S): at 22:00

## 2017-09-11 RX ADMIN — LIDOCAINE 1 PATCH: 4 CREAM TOPICAL at 11:51

## 2017-09-11 RX ADMIN — Medication 50 MICROGRAM(S): at 07:31

## 2017-09-11 RX ADMIN — Medication 100 MILLIGRAM(S): at 22:03

## 2017-09-11 RX ADMIN — Medication 325 MILLIGRAM(S): at 22:30

## 2017-09-11 RX ADMIN — LIDOCAINE 1 PATCH: 4 CREAM TOPICAL at 23:56

## 2017-09-11 RX ADMIN — QUETIAPINE FUMARATE 25 MILLIGRAM(S): 200 TABLET, FILM COATED ORAL at 00:02

## 2017-09-11 RX ADMIN — LIDOCAINE 1 PATCH: 4 CREAM TOPICAL at 00:04

## 2017-09-11 RX ADMIN — CITALOPRAM 10 MILLIGRAM(S): 10 TABLET, FILM COATED ORAL at 11:07

## 2017-09-11 RX ADMIN — LATANOPROST 1 DROP(S): 0.05 SOLUTION/ DROPS OPHTHALMIC; TOPICAL at 22:09

## 2017-09-11 RX ADMIN — MEMANTINE HYDROCHLORIDE 10 MILLIGRAM(S): 10 TABLET ORAL at 07:31

## 2017-09-11 RX ADMIN — Medication 325 MILLIGRAM(S): at 22:00

## 2017-09-11 RX ADMIN — Medication 100 MILLIGRAM(S): at 18:07

## 2017-09-11 RX ADMIN — DONEPEZIL HYDROCHLORIDE 10 MILLIGRAM(S): 10 TABLET, FILM COATED ORAL at 22:03

## 2017-09-11 RX ADMIN — ENOXAPARIN SODIUM 40 MILLIGRAM(S): 100 INJECTION SUBCUTANEOUS at 11:54

## 2017-09-11 NOTE — PROGRESS NOTE ADULT - ASSESSMENT
Postraumatic (fall) multiple R rib fractures and large R lpneumothorax   Respansion R lung post R chest tube insertion  Dementia with agitation and lethargy: high aspiration risk   R arm tremors - unclear etiology    REC    Add duoneb tid  Aspiration precautions: consider swallow eval  Consider dysphagia diet until swallow eval  Neuro eval if R sided tremors persist  Mobilize with PT as tolerated  Observe off antibiotics  DVT prophylaxis

## 2017-09-11 NOTE — PROGRESS NOTE ADULT - ASSESSMENT
87 y/o M w/ hx of HTN HLD CAD s/p PPM, dementia aaox 1-2 at baseline, glaucoma, hypothyroidism and parotid neoplasm who presented s/p fall in the bathtub found to have  R rib 5-8 fractures and pneumothorax s/p chest tube and removal.   Pneumothorax resolved.        Rib Fx - pain controlled c/w incentive spirometry      Bradycardia -resolved hr stable off bb currently     Bladder wall thickening on CT scan - Order UA/UC discussed with Dr Jiménez r/o UTI in setting of fall in elderly demented pt    HTN HLD CAD s/p PPM - bp controlled monitor c/w telemetry     Dementia /Delerium - Psy consult called Dr Still will see pt later today.  dicussed with dr higgins  c/w namenda seroquel PRN     DVT PPX

## 2017-09-11 NOTE — PROGRESS NOTE ADULT - SUBJECTIVE AND OBJECTIVE BOX
Patient is a 88y old  Male who presents with a chief complaint of unwitnessed fall, multiple rib fractures with ptx on xray (08 Sep 2017 11:31)      SUBJECTIVE / OVERNIGHT EVENTS:    pt seated bedside, conversive cordail.  pt confused.  denies any pain or discomfort. no cp abd pain n/v/d    Vital Signs Last 24 Hrs  T(C): 36.8 (11 Sep 2017 09:00), Max: 37 (10 Sep 2017 19:58)  T(F): 98.2 (11 Sep 2017 09:00), Max: 98.6 (10 Sep 2017 19:58)  HR: 71 (11 Sep 2017 09:00) (67 - 71)  BP: 152/71 (11 Sep 2017 09:00) (152/71 - 171/73)  BP(mean): --  RR: 18 (11 Sep 2017 09:00) (18 - 20)  SpO2: 95% (11 Sep 2017 09:00) (94% - 95%)  I&O's Summary    10 Sep 2017 07:01  -  11 Sep 2017 07:00  --------------------------------------------------------  IN: 550 mL / OUT: 0 mL / NET: 550 mL            LABS:            CAPILLARY BLOOD GLUCOSE                RADIOLOGY & ADDITIONAL TESTS:    Imaging Personally Reviewed:  [x] YES  [ ] NO    Consultant(s) Notes Reviewed:  [x] YES  [ ] NO      MEDICATIONS  (STANDING):  enoxaparin Injectable 40 milliGRAM(s) SubCutaneous every 24 hours  citalopram 10 milliGRAM(s) Oral daily  donepezil 10 milliGRAM(s) Oral at bedtime  latanoprost 0.005% Ophthalmic Solution 1 Drop(s) Both EYES at bedtime  memantine 10 milliGRAM(s) Oral every 12 hours  levothyroxine 50 MICROGram(s) Oral daily  lidocaine   Patch 1 Patch Transdermal every 24 hours  docusate sodium 100 milliGRAM(s) Oral three times a day  senna 2 Tablet(s) Oral at bedtime  tamsulosin 0.4 milliGRAM(s) Oral at bedtime    MEDICATIONS  (PRN):  hydrALAZINE Injectable 10 milliGRAM(s) IV Push every 4 hours PRN Systolic greater than 180  acetaminophen   Tablet. 325 milliGRAM(s) Oral every 4 hours PRN Mild Pain (1 - 3)      Care Discussed with Consultants/Other Providers [x] YES  [ ] NO    HEALTH ISSUES - PROBLEM Dx:  Hematuria: Hematuria

## 2017-09-11 NOTE — DIETITIAN INITIAL EVALUATION ADULT. - OTHER INFO
Nutrition assessment for length of stay. Limited nutrition and wt hx at this time. No previous wt hx in [place] flow sheets. Pt's current dosing wt is 171.2 pounds and bed scale wt of 175.2 pounds on 9/8. Pt noted with poor po intake per flow sheets, RN and PCAs. No reports of GI distress. Last bowel movement documented 9/10.

## 2017-09-11 NOTE — PROGRESS NOTE ADULT - SUBJECTIVE AND OBJECTIVE BOX
ACS Progress Note    S: Patient seen and examined. Was up and walking the halls again last night. Seroquel was increased to 50mg qHS.    O:  Vital Signs Last 24 Hrs  T(C): 36.8 (11 Sep 2017 14:00), Max: 37 (10 Sep 2017 19:58)  T(F): 98.2 (11 Sep 2017 14:00), Max: 98.6 (10 Sep 2017 19:58)  HR: 70 (11 Sep 2017 14:00) (69 - 71)  BP: 148/77 (11 Sep 2017 14:00) (148/77 - 156/69)  RR: 18 (11 Sep 2017 14:00) (18 - 20)  SpO2: 95% (11 Sep 2017 14:00) (95% - 95%)    I&O's Detail    10 Sep 2017 07:01  -  11 Sep 2017 07:00  --------------------------------------------------------  IN:    Oral Fluid: 550 mL  Total IN: 550 mL    OUT:  Total OUT: 0 mL    Total NET: 550 mL    11 Sep 2017 07:01  -  11 Sep 2017 15:46  --------------------------------------------------------  IN:    Oral Fluid: 240 mL  Total IN: 240 mL    OUT:    Voided: 200 mL  Total OUT: 200 mL    Total NET: 40 mL    MEDICATIONS  (STANDING):  enoxaparin Injectable 40 milliGRAM(s) SubCutaneous every 24 hours  citalopram 10 milliGRAM(s) Oral daily  donepezil 10 milliGRAM(s) Oral at bedtime  latanoprost 0.005% Ophthalmic Solution 1 Drop(s) Both EYES at bedtime  memantine 10 milliGRAM(s) Oral every 12 hours  levothyroxine 50 MICROGram(s) Oral daily  lidocaine   Patch 1 Patch Transdermal every 24 hours  docusate sodium 100 milliGRAM(s) Oral three times a day  senna 2 Tablet(s) Oral at bedtime  tamsulosin 0.4 milliGRAM(s) Oral at bedtime  ALBUTerol/ipratropium for Nebulization 3 milliLiter(s) Nebulizer three times a day    MEDICATIONS  (PRN):  hydrALAZINE Injectable 10 milliGRAM(s) IV Push every 4 hours PRN Systolic greater than 180  acetaminophen   Tablet. 325 milliGRAM(s) Oral every 4 hours PRN Mild Pain (1 - 3)    Physical Exam:  Gen: Laying in bed, NAD, alert and oriented.   Resp: Unlabored breathing  Abd: soft, NTND    Lines:   IV: patent, in place. ACS Progress Note    S: Patient seen and examined at 7am. Was up and walking the halls again last night. Seroquel was increased to 50mg qHS.    O:  Vital Signs Last 24 Hrs  T(C): 36.8 (11 Sep 2017 14:00), Max: 37 (10 Sep 2017 19:58)  T(F): 98.2 (11 Sep 2017 14:00), Max: 98.6 (10 Sep 2017 19:58)  HR: 70 (11 Sep 2017 14:00) (69 - 71)  BP: 148/77 (11 Sep 2017 14:00) (148/77 - 156/69)  RR: 18 (11 Sep 2017 14:00) (18 - 20)  SpO2: 95% (11 Sep 2017 14:00) (95% - 95%)    I&O's Detail    10 Sep 2017 07:01  -  11 Sep 2017 07:00  --------------------------------------------------------  IN:    Oral Fluid: 550 mL  Total IN: 550 mL    OUT:  Total OUT: 0 mL    Total NET: 550 mL    11 Sep 2017 07:01  -  11 Sep 2017 15:46  --------------------------------------------------------  IN:    Oral Fluid: 240 mL  Total IN: 240 mL    OUT:    Voided: 200 mL  Total OUT: 200 mL    Total NET: 40 mL    MEDICATIONS  (STANDING):  enoxaparin Injectable 40 milliGRAM(s) SubCutaneous every 24 hours  citalopram 10 milliGRAM(s) Oral daily  donepezil 10 milliGRAM(s) Oral at bedtime  latanoprost 0.005% Ophthalmic Solution 1 Drop(s) Both EYES at bedtime  memantine 10 milliGRAM(s) Oral every 12 hours  levothyroxine 50 MICROGram(s) Oral daily  lidocaine   Patch 1 Patch Transdermal every 24 hours  docusate sodium 100 milliGRAM(s) Oral three times a day  senna 2 Tablet(s) Oral at bedtime  tamsulosin 0.4 milliGRAM(s) Oral at bedtime  ALBUTerol/ipratropium for Nebulization 3 milliLiter(s) Nebulizer three times a day    MEDICATIONS  (PRN):  hydrALAZINE Injectable 10 milliGRAM(s) IV Push every 4 hours PRN Systolic greater than 180  acetaminophen   Tablet. 325 milliGRAM(s) Oral every 4 hours PRN Mild Pain (1 - 3)    Physical Exam:  Gen: Laying in bed, NAD, alert and oriented.   Resp: Unlabored breathing  Abd: soft, NTND    Lines:   IV: patent, in place.

## 2017-09-11 NOTE — DIETITIAN INITIAL EVALUATION ADULT. - ENERGY NEEDS
IBW: 148 pounds  Pertinent Information: 89 y/o M w/ hx of HTN HLD CAD s/p PPM, dementia aaox 1-2 at baseline, glaucoma, hypothyroidism and parotid neoplasm who presented s/p fall in the bathtub found to have  R rib 5-8 fractures and pneumothorax s/p chest tube and removal.   Pneumothorax resolved.    no edema, no pressure injury

## 2017-09-11 NOTE — PROGRESS NOTE ADULT - SUBJECTIVE AND OBJECTIVE BOX
Follow-up Pulm Progress Note  Stefan Vides MD  [phone]    On dysphagia diet - OOB and non verbal, confused  Wihtout overt tachypnea, yet with rhonchi/few exp wheezes  AFebrile and hemodynamically stable  Intermittent agitation - [place]    Vital Signs Last 24 Hrs  T(C): 36.8 (11 Sep 2017 09:00), Max: 37 (10 Sep 2017 19:58)  T(F): 98.2 (11 Sep 2017 09:00), Max: 98.6 (10 Sep 2017 19:58)  HR: 71 (11 Sep 2017 09:00) (67 - 71)  BP: 152/71 (11 Sep 2017 09:00) (152/71 - 171/73)  BP(mean): --  RR: 18 (11 Sep 2017 09:00) (18 - 20)  SpO2: 95% (11 Sep 2017 09:00) (94% - 95%)      Physical Examination:  PULM: BIlateral scattered exp rhonchi and wheeze  CVS: Regular rate and rhythm, no murmurs, rubs, or gallops  ABD: Soft, non-tender  EXT:  No clubbing, cyanosis, or edema    RADIOLOGY REVIEWED  CXR:    CT chest:    TTE:

## 2017-09-11 NOTE — DIETITIAN INITIAL EVALUATION ADULT. - PT NOT SOURCE
confused/Pt with Dementia, not responded to RD at time of visit, as per PCA and RN pt with change in mood

## 2017-09-11 NOTE — PROGRESS NOTE ADULT - ATTENDING COMMENTS
Pt seen and examined today at 11am, agree with above. Pt with disrupted day/night cycles, likely contributing to delirium in setting of dementia. Seroquel increased. Will try to keep pt awake during the day, with increased activity such as walking with PT and being out of bed. Avoid narcotics. Hypothyroidism: continue Synthroid. R 5-8 rib fractures: continue lidocaine patch, Tylenol. Bowel regimen. Dementia: continue Seroquel and Aricept, Celexa. Pt seen and examined today at 11am, agree with above. Pt without complaints, but unable to obtain full HPI/ROS secondary to delirium and dementia. Pt sleeping but easily arousable currently, denies pain. Pt with disrupted day/night cycles, likely contributing to delirium in setting of dementia. Seroquel increased. Will try to keep pt awake during the day, with increased activity such as walking with PT and being out of bed. Avoid narcotics. Hypothyroidism: continue Synthroid. R 5-8 rib fractures: continue lidocaine patch, Tylenol. Bowel regimen. Dementia: continue Seroquel and Aricept, Celexa.

## 2017-09-11 NOTE — PROGRESS NOTE ADULT - ASSESSMENT
89 y/o M w/ hx of CAD s/p PPM, dementia, glaucoma, hypothyroisim and parotid neoplasm who presented with R rib 5-8 fractures and pneumothorax after falling in bathtub. Patient has been having severe sundowning and becomes very agitated at night.     Plan:   - UA ordered per IM recommendations, f/u result  - PT, OOB as tolerated  - Pain control  - Holding metroprolol for bradycardia, VS q4h  - Regular diet  - seroquel at bedtime for agitation, haldol PRN  - DVT ppx   - On constant obs, can try enhanced obs once patient is in bed closer to nursing station  - Dispo: family wants [facility], which was recommended by PT    Antwon Marie MD

## 2017-09-11 NOTE — DIETITIAN INITIAL EVALUATION ADULT. - NS AS NUTRI INTERV MEALS SNACK
General/healthful diet/Defer texture/consistency to SLP/MD within goals of care, pt currently on Dysphagia 3 soft with nectar thick fluids. Encourage po intake with nutrient dense foods. Provide food preferences as able. Monitor weight, lab values, po intake and GI tolerance. RD to remain available for further nutrition interventions as indicated.

## 2017-09-11 NOTE — CONSULT NOTE ADULT - ASSESSMENT
Dementia with behavioral disturbance.   Discussed with wife at length the risks and benefits of psychiatric meds and she wants only meds prn severe agitation.  Not on a regular alcohol pattern of use so doubt he is a withdrawal risk though ETOH may be contributing to fall, dementia.  No recent syncope so we can continue Aricept.    Recommend  Check QTc and if under 500ms can rx Seroquel 25mg p.o. q8h prn severe agitation not responding to behavioral interventions (talking to him) or Haldol 0.5mg IM q8h prn agitation if he will not take pills.   Told wife to avoid all alcohol and benzodiazepines (given his fall and dementia)  Thiamine 100mg p.o. qd.  D/C Namenda.   Will follow with you here.    Stefan Santillan M.D.  Psychiatry  [phone]

## 2017-09-12 PROCEDURE — 99231 SBSQ HOSP IP/OBS SF/LOW 25: CPT

## 2017-09-12 RX ORDER — CEFTRIAXONE 500 MG/1
1 INJECTION, POWDER, FOR SOLUTION INTRAMUSCULAR; INTRAVENOUS EVERY 24 HOURS
Qty: 0 | Refills: 0 | Status: DISCONTINUED | OUTPATIENT
Start: 2017-09-13 | End: 2017-09-16

## 2017-09-12 RX ORDER — CEFTRIAXONE 500 MG/1
INJECTION, POWDER, FOR SOLUTION INTRAMUSCULAR; INTRAVENOUS
Qty: 0 | Refills: 0 | Status: DISCONTINUED | OUTPATIENT
Start: 2017-09-12 | End: 2017-09-16

## 2017-09-12 RX ORDER — QUETIAPINE FUMARATE 200 MG/1
25 TABLET, FILM COATED ORAL EVERY 8 HOURS
Qty: 0 | Refills: 0 | Status: DISCONTINUED | OUTPATIENT
Start: 2017-09-12 | End: 2017-09-14

## 2017-09-12 RX ORDER — CEFTRIAXONE 500 MG/1
1 INJECTION, POWDER, FOR SOLUTION INTRAMUSCULAR; INTRAVENOUS ONCE
Qty: 0 | Refills: 0 | Status: COMPLETED | OUTPATIENT
Start: 2017-09-12 | End: 2017-09-12

## 2017-09-12 RX ADMIN — Medication 100 MILLIGRAM(S): at 12:26

## 2017-09-12 RX ADMIN — TAMSULOSIN HYDROCHLORIDE 0.4 MILLIGRAM(S): 0.4 CAPSULE ORAL at 21:22

## 2017-09-12 RX ADMIN — Medication 100 MILLIGRAM(S): at 21:22

## 2017-09-12 RX ADMIN — Medication 3 MILLILITER(S): at 16:49

## 2017-09-12 RX ADMIN — Medication 100 MILLIGRAM(S): at 05:57

## 2017-09-12 RX ADMIN — Medication 3 MILLILITER(S): at 05:57

## 2017-09-12 RX ADMIN — CITALOPRAM 10 MILLIGRAM(S): 10 TABLET, FILM COATED ORAL at 12:26

## 2017-09-12 RX ADMIN — CEFTRIAXONE 100 GRAM(S): 500 INJECTION, POWDER, FOR SOLUTION INTRAMUSCULAR; INTRAVENOUS at 12:26

## 2017-09-12 RX ADMIN — Medication 50 MICROGRAM(S): at 05:57

## 2017-09-12 RX ADMIN — SENNA PLUS 2 TABLET(S): 8.6 TABLET ORAL at 21:22

## 2017-09-12 RX ADMIN — LIDOCAINE 1 PATCH: 4 CREAM TOPICAL at 12:26

## 2017-09-12 RX ADMIN — Medication 3 MILLILITER(S): at 21:22

## 2017-09-12 RX ADMIN — LATANOPROST 1 DROP(S): 0.05 SOLUTION/ DROPS OPHTHALMIC; TOPICAL at 21:26

## 2017-09-12 RX ADMIN — ENOXAPARIN SODIUM 40 MILLIGRAM(S): 100 INJECTION SUBCUTANEOUS at 12:26

## 2017-09-12 RX ADMIN — DONEPEZIL HYDROCHLORIDE 10 MILLIGRAM(S): 10 TABLET, FILM COATED ORAL at 21:22

## 2017-09-12 NOTE — PROGRESS NOTE ADULT - CONSTITUTIONAL DETAILS
pale/well-groomed
well-developed/well-groomed
well-nourished/well-groomed
well-groomed/well-developed

## 2017-09-12 NOTE — PROGRESS NOTE ADULT - SUBJECTIVE AND OBJECTIVE BOX
Events noted. Per nurse manager, no agitation. He eats with assistance. He did not require Seroquel nor any prn psych. med over the last 24 hours.       Vital Signs Last 24 Hrs  T(C): 36.8 (12 Sep 2017 13:30), Max: 36.8 (12 Sep 2017 02:38)  T(F): 98.3 (12 Sep 2017 13:30), Max: 98.3 (12 Sep 2017 02:38)  HR: 69 (12 Sep 2017 13:30) (63 - 84)  BP: 171/72 (12 Sep 2017 13:30) (128/66 - 178/69)  BP(mean): --  RR: 18 (12 Sep 2017 13:30) (18 - 20)  SpO2: 95% (12 Sep 2017 13:30) (93% - 98%)                        MEDICATIONS  (STANDING):  enoxaparin Injectable 40 milliGRAM(s) SubCutaneous every 24 hours  citalopram 10 milliGRAM(s) Oral daily  donepezil 10 milliGRAM(s) Oral at bedtime  latanoprost 0.005% Ophthalmic Solution 1 Drop(s) Both EYES at bedtime  levothyroxine 50 MICROGram(s) Oral daily  lidocaine   Patch 1 Patch Transdermal every 24 hours  docusate sodium 100 milliGRAM(s) Oral three times a day  senna 2 Tablet(s) Oral at bedtime  tamsulosin 0.4 milliGRAM(s) Oral at bedtime  ALBUTerol/ipratropium for Nebulization 3 milliLiter(s) Nebulizer three times a day  thiamine 100 milliGRAM(s) Oral daily  cefTRIAXone   IVPB        MEDICATIONS  (PRN):  hydrALAZINE Injectable 10 milliGRAM(s) IV Push every 4 hours PRN Systolic greater than 180  acetaminophen   Tablet. 325 milliGRAM(s) Oral every 4 hours PRN Mild Pain (1 - 3)  QUEtiapine 25 milliGRAM(s) Oral every 8 hours PRN severe agitation      Elderly WM in bed, calm, eyes closed for most of interview, alert and oriented x 1 .  He got agitated when PCA gave him a urinal.  Insight and judgment are impaired. Speech is minimal with low volume. No hallucinations nor delusions. The patient denied suicidal and homicidal ideation and plan. Mood is euthymic and affect flat. Poor Attention and concentration, short term memory, and long term memory.

## 2017-09-12 NOTE — PROGRESS NOTE ADULT - ASSESSMENT
Postraumatic (fall) multiple R rib fractures and large R lpneumothorax   Respansion R lung post R chest tube insertion  Dementia with agitation and lethargy: high aspiration risk   R arm tremors - unclear etiology  Unclear if lpatient has UTI - cultures pending, ? aysmptomatic bacteuria    REC    Consider DC antibiotics  Add duoneb tid  Mobilize with PT as tolerated  DVT prophylaxis

## 2017-09-12 NOTE — PROGRESS NOTE ADULT - ASSESSMENT
Dementia with behavioral disturbance      Recommend  Continue current rx  Social work consult to address help that will be needed at home    Stefan Santillan M.D.  Psychiatry  [phone]

## 2017-09-12 NOTE — PROGRESS NOTE ADULT - SUBJECTIVE AND OBJECTIVE BOX
Follow-up Pulm Progress Note  Stefan Vides MD  [phone]    Alert and interactive today, yet confused  Afebrile on ceftriaxone started for ? positive UA  Tremors resolved    Vital Signs Last 24 Hrs  T(C): 36.8 (12 Sep 2017 13:30), Max: 36.8 (12 Sep 2017 02:38)  T(F): 98.3 (12 Sep 2017 13:30), Max: 98.3 (12 Sep 2017 02:38)  HR: 69 (12 Sep 2017 13:30) (63 - 84)  BP: 171/72 (12 Sep 2017 13:30) (128/66 - 178/69)  BP(mean): --  RR: 18 (12 Sep 2017 13:30) (18 - 20)  SpO2: 95% (12 Sep 2017 13:30) (93% - 98%)      Physical Examination:  PULM: Clear without wheeze or rhonchi  CVS: Regular rate and rhythm, no murmurs, rubs, or gallops  ABD: Soft, non-tender  EXT:  No clubbing, cyanosis, or edema    RADIOLOGY REVIEWED  CXR:    CT chest:    TTE:

## 2017-09-12 NOTE — PROGRESS NOTE ADULT - ATTENDING COMMENTS
Pt seen and examined on 9/12 at 11am, agree with above. At that time, pt asleep in bed but arousable, with baseline advanced dementia. I spoke with pt's son, who indicated that pt's wife was trying to decide between having pt go to rehab vs going home with an aide. Tolerating po. Dementia: per Psych note, pt's wife prefers to stop all pysch meds, which was done.

## 2017-09-12 NOTE — PROGRESS NOTE ADULT - SUBJECTIVE AND OBJECTIVE BOX
ACS Progress Note    S: Patient seen and examined this AM. Patient resting comfortably. Was less agitated last night, only agitated for about an hour per PCA. UA was positive yesterday, ceftriaxone started for 3-5 day course. EKG performed with QTc 479.    O:  Vital Signs Last 24 Hrs  T(C): 36.8 (12 Sep 2017 13:30), Max: 36.8 (12 Sep 2017 02:38)  T(F): 98.3 (12 Sep 2017 13:30), Max: 98.3 (12 Sep 2017 02:38)  HR: 69 (12 Sep 2017 13:30) (63 - 84)  BP: 171/72 (12 Sep 2017 13:30) (128/66 - 178/69)  RR: 18 (12 Sep 2017 13:30) (18 - 20)  SpO2: 95% (12 Sep 2017 13:30) (93% - 98%)    I&O's Detail    11 Sep 2017 07:01  -  12 Sep 2017 07:00  --------------------------------------------------------  IN:    Oral Fluid: 750 mL  Total IN: 750 mL    OUT:    Voided: 250 mL  Total OUT: 250 mL    Total NET: 500 mL    12 Sep 2017 07:01  -  12 Sep 2017 16:08  --------------------------------------------------------  IN:    Oral Fluid: 525 mL  Total IN: 525 mL    OUT:    Voided: 300 mL  Total OUT: 300 mL    Total NET: 225 mL    MEDICATIONS  (STANDING):  enoxaparin Injectable 40 milliGRAM(s) SubCutaneous every 24 hours  citalopram 10 milliGRAM(s) Oral daily  donepezil 10 milliGRAM(s) Oral at bedtime  latanoprost 0.005% Ophthalmic Solution 1 Drop(s) Both EYES at bedtime  levothyroxine 50 MICROGram(s) Oral daily  lidocaine   Patch 1 Patch Transdermal every 24 hours  docusate sodium 100 milliGRAM(s) Oral three times a day  senna 2 Tablet(s) Oral at bedtime  tamsulosin 0.4 milliGRAM(s) Oral at bedtime  ALBUTerol/ipratropium for Nebulization 3 milliLiter(s) Nebulizer three times a day  thiamine 100 milliGRAM(s) Oral daily  cefTRIAXone   IVPB        MEDICATIONS  (PRN):  hydrALAZINE Injectable 10 milliGRAM(s) IV Push every 4 hours PRN Systolic greater than 180  acetaminophen   Tablet. 325 milliGRAM(s) Oral every 4 hours PRN Mild Pain (1 - 3)  QUEtiapine 25 milliGRAM(s) Oral every 8 hours PRN severe agitation    Physical Exam: From 6 AM  Gen: Laying in bed, NAD, resting comfortably  Resp: Unlabored breathing  Abd: non-distended appearing    Lines:   IV: patent, in place.

## 2017-09-12 NOTE — PROGRESS NOTE ADULT - SUBJECTIVE AND OBJECTIVE BOX
Patient is a 88y old  Male who presents with a chief complaint of unwitnessed fall, multiple rib fractures with ptx on xray (08 Sep 2017 11:31)      SUBJECTIVE / OVERNIGHT EVENTS:    pt resting in bed notes some back discomfort from laying in bed. pt confused cordial conversive.  no sob abd pain n/v/d     Vital Signs Last 24 Hrs  T(C): 36.7 (12 Sep 2017 08:52), Max: 36.8 (11 Sep 2017 14:00)  T(F): 98.1 (12 Sep 2017 08:52), Max: 98.3 (12 Sep 2017 02:38)  HR: 72 (12 Sep 2017 08:52) (63 - 84)  BP: 149/62 (12 Sep 2017 08:52) (128/66 - 178/69)  BP(mean): --  RR: 18 (12 Sep 2017 08:52) (18 - 20)  SpO2: 96% (12 Sep 2017 08:52) (93% - 98%)  I&O's Summary    11 Sep 2017 07:01  -  12 Sep 2017 07:00  --------------------------------------------------------  IN: 750 mL / OUT: 250 mL / NET: 500 mL    12 Sep 2017 07:01  -  12 Sep 2017 10:53  --------------------------------------------------------  IN: 250 mL / OUT: 0 mL / NET: 250 mL            LABS:            CAPILLARY BLOOD GLUCOSE            Urinalysis Basic - ( 11 Sep 2017 14:59 )    Color: Le[place] / Appearance: x / S.027 / pH: x  Gluc: x / Ketone: Moderate  / Bili: Negative / Urobili: 1.0 mg/dL   Blood: x / Protein: 100 mg/dL / Nitrite: Negative   Leuk Esterase: Moderate / RBC: 118 /HPF / WBC 53 /HPF   Sq Epi: x / Non Sq Epi: 0 /HPF / Bacteria: Negative        RADIOLOGY & ADDITIONAL TESTS:    Imaging Personally Reviewed:  [x] YES  [ ] NO    Consultant(s) Notes Reviewed:  [x] YES  [ ] NO      MEDICATIONS  (STANDING):  enoxaparin Injectable 40 milliGRAM(s) SubCutaneous every 24 hours  citalopram 10 milliGRAM(s) Oral daily  donepezil 10 milliGRAM(s) Oral at bedtime  latanoprost 0.005% Ophthalmic Solution 1 Drop(s) Both EYES at bedtime  levothyroxine 50 MICROGram(s) Oral daily  lidocaine   Patch 1 Patch Transdermal every 24 hours  docusate sodium 100 milliGRAM(s) Oral three times a day  senna 2 Tablet(s) Oral at bedtime  tamsulosin 0.4 milliGRAM(s) Oral at bedtime  ALBUTerol/ipratropium for Nebulization 3 milliLiter(s) Nebulizer three times a day  thiamine 100 milliGRAM(s) Oral daily    MEDICATIONS  (PRN):  hydrALAZINE Injectable 10 milliGRAM(s) IV Push every 4 hours PRN Systolic greater than 180  acetaminophen   Tablet. 325 milliGRAM(s) Oral every 4 hours PRN Mild Pain (1 - 3)  QUEtiapine 25 milliGRAM(s) Oral every 8 hours PRN severe agitation      Care Discussed with Consultants/Other Providers [x] YES  [ ] NO    HEALTH ISSUES - PROBLEM Dx:  Hematuria: Hematuria

## 2017-09-12 NOTE — PROGRESS NOTE ADULT - ASSESSMENT
89 y/o M w/ hx of HTN HLD CAD s/p PPM, dementia aaox 1-2 at baseline, glaucoma, hypothyroidism and parotid neoplasm who presented s/p fall in the bathtub found to have  R rib 5-8 fractures and pneumothorax s/p chest tube and removal.   Pneumothorax resolved.        Rib Fx - pain controlled c/w incentive spirometry      Bradycardia -resolved hr stable off bb currently     Bladder wall thickening / Cystitis - will start ceftriaxone.  Pyuria, + leukesterase will limit course to 3-5 days.  c/w IV meds while inpt.   may switch to PO Levaquin 500mg daily upon DC.  discussed with Dr Schaefer Trauma surg.    HTN HLD CAD s/p PPM - bp minimally elevated, pain component  avoid aggrieve control in elderly monitor c/w telemetry     Dementia /Delerium - Psy consult appreciated  namenda DC'd c/w seroquel  thiamine      DVT PPX

## 2017-09-12 NOTE — PROGRESS NOTE ADULT - ASSESSMENT
89 y/o M w/ hx of CAD s/p PPM, dementia, glaucoma, hypothyroisim and parotid neoplasm who presented with R rib 5-8 fractures and pneumothorax after falling in bathtub. Patient has been having severe sundowning and becomes very agitated at night.     Plan:   - ceftriaxone 3-5 days for positive UA, can transition to levaquin if patient is ready for discharge  - Duonebs TID added per pulm recs  - PT, OOB as tolerated, IS as tolerated  - seroquel changed to 25 mg q8 hours per psych recommendations, also started on thiamine and namenda discontinued  - Pain control  - Holding metroprolol for bradycardia, VS q4h  - Regular diet  - DVT ppx   - On constant obs, can try enhanced obs once patient is in bed closer to nursing station and agitation decreased  - Dispo: family requesting [facility], which was recommended by PT    Antwon Marie MD

## 2017-09-13 PROCEDURE — 99232 SBSQ HOSP IP/OBS MODERATE 35: CPT

## 2017-09-13 RX ADMIN — Medication 50 MICROGRAM(S): at 05:35

## 2017-09-13 RX ADMIN — Medication 100 MILLIGRAM(S): at 12:17

## 2017-09-13 RX ADMIN — Medication 3 MILLILITER(S): at 05:36

## 2017-09-13 RX ADMIN — CITALOPRAM 10 MILLIGRAM(S): 10 TABLET, FILM COATED ORAL at 12:17

## 2017-09-13 RX ADMIN — TAMSULOSIN HYDROCHLORIDE 0.4 MILLIGRAM(S): 0.4 CAPSULE ORAL at 23:32

## 2017-09-13 RX ADMIN — Medication 100 MILLIGRAM(S): at 23:32

## 2017-09-13 RX ADMIN — Medication 3 MILLILITER(S): at 23:32

## 2017-09-13 RX ADMIN — Medication 325 MILLIGRAM(S): at 03:15

## 2017-09-13 RX ADMIN — LIDOCAINE 1 PATCH: 4 CREAM TOPICAL at 00:26

## 2017-09-13 RX ADMIN — LATANOPROST 1 DROP(S): 0.05 SOLUTION/ DROPS OPHTHALMIC; TOPICAL at 23:32

## 2017-09-13 RX ADMIN — LIDOCAINE 1 PATCH: 4 CREAM TOPICAL at 12:25

## 2017-09-13 RX ADMIN — ENOXAPARIN SODIUM 40 MILLIGRAM(S): 100 INJECTION SUBCUTANEOUS at 12:17

## 2017-09-13 RX ADMIN — Medication 325 MILLIGRAM(S): at 02:45

## 2017-09-13 RX ADMIN — DONEPEZIL HYDROCHLORIDE 10 MILLIGRAM(S): 10 TABLET, FILM COATED ORAL at 23:32

## 2017-09-13 RX ADMIN — CEFTRIAXONE 100 GRAM(S): 500 INJECTION, POWDER, FOR SOLUTION INTRAMUSCULAR; INTRAVENOUS at 12:23

## 2017-09-13 RX ADMIN — QUETIAPINE FUMARATE 25 MILLIGRAM(S): 200 TABLET, FILM COATED ORAL at 17:05

## 2017-09-13 RX ADMIN — Medication 3 MILLILITER(S): at 15:38

## 2017-09-13 RX ADMIN — SENNA PLUS 2 TABLET(S): 8.6 TABLET ORAL at 23:32

## 2017-09-13 NOTE — PROGRESS NOTE ADULT - ASSESSMENT
89 y/o M w/ hx of CAD s/p PPM, dementia, glaucoma, hypothyroisim and parotid neoplasm who presented with R rib 5-8 fractures and pneumothorax after falling in bathtub. Patient has been having severe sundowning and becomes very agitated at night.     Plan:   - ceftriaxone 3-5 days for positive UA, can transition to levaquin if patient is ready for discharge (abx started 9/12)  - Duonebs TID per pulm recs  - PT, OOB as tolerated, IS as tolerated  - seroquel 25 mg q8 hours, thiamine, hold namenda per psych recommendations  - Pain control  - Holding metroprolol for bradycardia, VS q4h  - Regular diet  - DVT ppx   - Continue enhanced obs  - Dispo: family requesting [facility], which was recommended by PT    Antwon Marie MD

## 2017-09-13 NOTE — PROGRESS NOTE ADULT - SUBJECTIVE AND OBJECTIVE BOX
Patient is a 88y old  Male who presents with a chief complaint of unwitnessed fall, multiple rib fractures with ptx on xray (08 Sep 2017 11:31)      SUBJECTIVE / OVERNIGHT EVENTS:    patient seen and examined at bedside  sitting up comfortably, demented, confused  no acute events overnight    Vital Signs Last 24 Hrs  T(C): 36.8 (13 Sep 2017 08:01), Max: 36.9 (12 Sep 2017 21:24)  T(F): 98.3 (13 Sep 2017 08:), Max: 98.5 (12 Sep 2017 21:24)  HR: 65 (13 Sep 2017 08:) (64 - 67)  BP: 159/65 (13 Sep 2017 08:) (159/65 - 179/65)  BP(mean): --  RR: 18 (13 Sep 2017 08:) (18 - 18)  SpO2: 94% (13 Sep 2017 08:) (94% - 97%)  I&O's Summary    12 Sep 2017 07:01  -  13 Sep 2017 07:00  --------------------------------------------------------  IN: 715 mL / OUT: 300 mL / NET: 415 mL    13 Sep 2017 07:01  -  13 Sep 2017 13:50  --------------------------------------------------------  IN: 350 mL / OUT: 200 mL / NET: 150 mL        GENERAL: NAD, AAOx0  HEAD:  Atraumatic, Normocephalic  EYES: EOMI, PERRLA, conjunctiva and sclera clear  NECK: Supple, No JVD, No LAD  CHEST/LUNG: Clear to auscultation bilaterally; No wheeze  HEART: Regular rate and rhythm; No murmurs, rubs, or gallops  ABDOMEN: Soft, Nontender, Nondistended; Bowel sounds present  EXTREMITIES:  2+ Peripheral Pulses, No clubbing, cyanosis, or edema  SKIN: No rashes or lesions      LABS:            CAPILLARY BLOOD GLUCOSE            Urinalysis Basic - ( 11 Sep 2017 14:59 )    Color: Leatha / Appearance: x / S.027 / pH: x  Gluc: x / Ketone: Moderate  / Bili: Negative / Urobili: 1.0 mg/dL   Blood: x / Protein: 100 mg/dL / Nitrite: Negative   Leuk Esterase: Moderate / RBC: 118 /HPF / WBC 53 /HPF   Sq Epi: x / Non Sq Epi: 0 /HPF / Bacteria: Negative        RADIOLOGY & ADDITIONAL TESTS:    Imaging Personally Reviewed:  [x] YES  [ ] NO    Consultant(s) Notes Reviewed:  [x] YES  [ ] NO      MEDICATIONS  (STANDING):  enoxaparin Injectable 40 milliGRAM(s) SubCutaneous every 24 hours  citalopram 10 milliGRAM(s) Oral daily  donepezil 10 milliGRAM(s) Oral at bedtime  latanoprost 0.005% Ophthalmic Solution 1 Drop(s) Both EYES at bedtime  levothyroxine 50 MICROGram(s) Oral daily  lidocaine   Patch 1 Patch Transdermal every 24 hours  docusate sodium 100 milliGRAM(s) Oral three times a day  senna 2 Tablet(s) Oral at bedtime  tamsulosin 0.4 milliGRAM(s) Oral at bedtime  ALBUTerol/ipratropium for Nebulization 3 milliLiter(s) Nebulizer three times a day  thiamine 100 milliGRAM(s) Oral daily  cefTRIAXone   IVPB      cefTRIAXone   IVPB 1 Gram(s) IV Intermittent every 24 hours    MEDICATIONS  (PRN):  hydrALAZINE Injectable 10 milliGRAM(s) IV Push every 4 hours PRN Systolic greater than 180  acetaminophen   Tablet. 325 milliGRAM(s) Oral every 4 hours PRN Mild Pain (1 - 3)  QUEtiapine 25 milliGRAM(s) Oral every 8 hours PRN severe agitation      Care Discussed with Consultants/Other Providers [x] YES  [ ] NO    HEALTH ISSUES - PROBLEM Dx:  Hematuria: Hematuria

## 2017-09-13 NOTE — PROGRESS NOTE ADULT - ASSESSMENT
89 y/o M w/ hx of HTN HLD CAD s/p PPM, dementia aaox 1-2 at baseline, glaucoma, hypothyroidism and parotid neoplasm who presented s/p fall in the bathtub found to have  R rib 5-8 fractures and pneumothorax s/p chest tube and removal.   Pneumothorax resolved.        Rib Fx - pain controlled c/w incentive spirometry, PT eval and d/c planning      Bradycardia -resolved hr stable off bb currently     Bladder wall thickening / Cystitis - will start ceftriaxone.  Pyuria, + leukesterase will limit course to 3-5 days.  c/w IV meds while inpt.   may switch to PO Levaquin 500mg daily upon DC.     HTN HLD CAD s/p PPM - bp minimally elevated, pain component  avoid aggressive control in elderly monitor c/w telemetry     Dementia /Delerium - Psy consult appreciated, c/w seroquel  thiamine      DVT PPX

## 2017-09-13 NOTE — PROGRESS NOTE ADULT - SUBJECTIVE AND OBJECTIVE BOX
Follow-up Pulm Progress Note  Stefan Vides MD  [phone]    Alert and interactive today, yet confused  Afebrile on ceftriaxone started for ? positive UA:; no C&S  No new resp issues    Vital Signs Last 24 Hrs  T(C): 36.8 (13 Sep 2017 08:01), Max: 36.9 (12 Sep 2017 21:24)  T(F): 98.3 (13 Sep 2017 08:01), Max: 98.5 (12 Sep 2017 21:24)  HR: 65 (13 Sep 2017 08:01) (64 - 67)  BP: 159/65 (13 Sep 2017 08:01) (159/65 - 179/65)  BP(mean): --  RR: 18 (13 Sep 2017 08:01) (18 - 18)  SpO2: 94% (13 Sep 2017 08:01) (94% - 97%)      Physical Examination:  PULM: Clear without wheeze or rhonchi  CVS: Regular rate and rhythm, no murmurs, rubs, or gallops  ABD: Soft, non-tender  EXT:  No clubbing, cyanosis, or edema    RADIOLOGY REVIEWED  CXR:    CT chest:    TTE:

## 2017-09-13 NOTE — PROGRESS NOTE ADULT - ASSESSMENT
Postraumatic (fall) multiple R rib fractures and large R lpneumothorax   Respansion R lung post R chest tube insertion  Dementia with agitation and lethargy: high aspiration risk   R arm tremors - unclear etiology  Doubt UTI or urosepsis    REC    Consider DC antibiotics  Add duoneb tid  Mobilize with PT as tolerated  DVT prophylaxis

## 2017-09-13 NOTE — PROGRESS NOTE ADULT - SUBJECTIVE AND OBJECTIVE BOX
ACS Progress Note    S: Patient seen and examined. Patient was agitated only briefly last night and remained on enhanced observation. Doing well this morning, resting comfortably in bed. No acute complaints.    O:  Vital Signs Last 24 Hrs  T(C): 37 (13 Sep 2017 14:12), Max: 37 (13 Sep 2017 14:12)  T(F): 98.6 (13 Sep 2017 14:12), Max: 98.6 (13 Sep 2017 14:12)  HR: 91 (13 Sep 2017 14:12) (64 - 91)  BP: 167/75 (13 Sep 2017 14:12) (159/65 - 179/65)  RR: 18 (13 Sep 2017 14:12) (18 - 18)  SpO2: 95% (13 Sep 2017 14:12) (94% - 97%)    I&O's Detail    12 Sep 2017 07:01  -  13 Sep 2017 07:00  --------------------------------------------------------  IN:    IV PiggyBack: 50 mL    Oral Fluid: 665 mL  Total IN: 715 mL    OUT:    Voided: 300 mL  Total OUT: 300 mL    Total NET: 415 mL    13 Sep 2017 07:01  -  13 Sep 2017 14:57  --------------------------------------------------------  IN:    Oral Fluid: 350 mL  Total IN: 350 mL    OUT:    Voided: 200 mL  Total OUT: 200 mL    Total NET: 150 mL    MEDICATIONS  (STANDING):  enoxaparin Injectable 40 milliGRAM(s) SubCutaneous every 24 hours  citalopram 10 milliGRAM(s) Oral daily  donepezil 10 milliGRAM(s) Oral at bedtime  latanoprost 0.005% Ophthalmic Solution 1 Drop(s) Both EYES at bedtime  levothyroxine 50 MICROGram(s) Oral daily  lidocaine   Patch 1 Patch Transdermal every 24 hours  docusate sodium 100 milliGRAM(s) Oral three times a day  senna 2 Tablet(s) Oral at bedtime  tamsulosin 0.4 milliGRAM(s) Oral at bedtime  ALBUTerol/ipratropium for Nebulization 3 milliLiter(s) Nebulizer three times a day  thiamine 100 milliGRAM(s) Oral daily  cefTRIAXone   IVPB      cefTRIAXone   IVPB 1 Gram(s) IV Intermittent every 24 hours    MEDICATIONS  (PRN):  hydrALAZINE Injectable 10 milliGRAM(s) IV Push every 4 hours PRN Systolic greater than 180  acetaminophen   Tablet. 325 milliGRAM(s) Oral every 4 hours PRN Mild Pain (1 - 3)  QUEtiapine 25 milliGRAM(s) Oral every 8 hours PRN severe agitation    Physical Exam:  Gen: Laying in bed, NAD, alert and oriented.   Resp: Unlabored breathing  Abd: soft, NTND    Lines:   IV: patent, in place.

## 2017-09-13 NOTE — PROGRESS NOTE ADULT - ATTENDING COMMENTS
Pt seen and examined at 11am, agree with above. At that time, pt was in chair, calm and alert. Tolerating po. Awaiting dispo planning.

## 2017-09-14 PROCEDURE — 99231 SBSQ HOSP IP/OBS SF/LOW 25: CPT

## 2017-09-14 RX ORDER — QUETIAPINE FUMARATE 200 MG/1
12.5 TABLET, FILM COATED ORAL EVERY 8 HOURS
Qty: 0 | Refills: 0 | Status: DISCONTINUED | OUTPATIENT
Start: 2017-09-14 | End: 2017-09-17

## 2017-09-14 RX ADMIN — LIDOCAINE 1 PATCH: 4 CREAM TOPICAL at 00:57

## 2017-09-14 RX ADMIN — SENNA PLUS 2 TABLET(S): 8.6 TABLET ORAL at 22:05

## 2017-09-14 RX ADMIN — DONEPEZIL HYDROCHLORIDE 10 MILLIGRAM(S): 10 TABLET, FILM COATED ORAL at 22:05

## 2017-09-14 RX ADMIN — TAMSULOSIN HYDROCHLORIDE 0.4 MILLIGRAM(S): 0.4 CAPSULE ORAL at 22:05

## 2017-09-14 RX ADMIN — Medication 325 MILLIGRAM(S): at 22:05

## 2017-09-14 RX ADMIN — Medication 100 MILLIGRAM(S): at 22:05

## 2017-09-14 RX ADMIN — Medication 3 MILLILITER(S): at 22:03

## 2017-09-14 RX ADMIN — LIDOCAINE 1 PATCH: 4 CREAM TOPICAL at 13:10

## 2017-09-14 RX ADMIN — LATANOPROST 1 DROP(S): 0.05 SOLUTION/ DROPS OPHTHALMIC; TOPICAL at 22:03

## 2017-09-14 RX ADMIN — Medication 3 MILLILITER(S): at 07:01

## 2017-09-14 RX ADMIN — ENOXAPARIN SODIUM 40 MILLIGRAM(S): 100 INJECTION SUBCUTANEOUS at 13:11

## 2017-09-14 RX ADMIN — CEFTRIAXONE 100 GRAM(S): 500 INJECTION, POWDER, FOR SOLUTION INTRAMUSCULAR; INTRAVENOUS at 13:10

## 2017-09-14 RX ADMIN — Medication 50 MICROGRAM(S): at 06:53

## 2017-09-14 RX ADMIN — Medication 3 MILLILITER(S): at 13:11

## 2017-09-14 RX ADMIN — Medication 100 MILLIGRAM(S): at 06:53

## 2017-09-14 NOTE — PROGRESS NOTE ADULT - SUBJECTIVE AND OBJECTIVE BOX
Events noted. Asked by resident to see as pt is sleepy today after receiving prn Seroquel 25mg yesterday at 5:00 p.m. for agitation. Wife told me pt often is sleepy at home and took Ritalin for that. RN says he has periods of being awake today.       Vital Signs Last 24 Hrs  T(C): 36.8 (14 Sep 2017 16:35), Max: 37.3 (13 Sep 2017 23:56)  T(F): 98.2 (14 Sep 2017 16:35), Max: 99.1 (13 Sep 2017 23:56)  HR: 70 (14 Sep 2017 16:35) (63 - 81)  BP: 152/67 (14 Sep 2017 16:35) (103/57 - 162/65)  BP(mean): --  RR: 18 (14 Sep 2017 16:35) (18 - 18)  SpO2: 97% (14 Sep 2017 16:35) (93% - 98%)                        MEDICATIONS  (STANDING):  enoxaparin Injectable 40 milliGRAM(s) SubCutaneous every 24 hours  citalopram 10 milliGRAM(s) Oral daily  donepezil 10 milliGRAM(s) Oral at bedtime  latanoprost 0.005% Ophthalmic Solution 1 Drop(s) Both EYES at bedtime  levothyroxine 50 MICROGram(s) Oral daily  lidocaine   Patch 1 Patch Transdermal every 24 hours  docusate sodium 100 milliGRAM(s) Oral three times a day  senna 2 Tablet(s) Oral at bedtime  tamsulosin 0.4 milliGRAM(s) Oral at bedtime  ALBUTerol/ipratropium for Nebulization 3 milliLiter(s) Nebulizer three times a day  thiamine 100 milliGRAM(s) Oral daily  cefTRIAXone   IVPB      cefTRIAXone   IVPB 1 Gram(s) IV Intermittent every 24 hours    MEDICATIONS  (PRN):  hydrALAZINE Injectable 10 milliGRAM(s) IV Push every 4 hours PRN Systolic greater than 180  acetaminophen   Tablet. 325 milliGRAM(s) Oral every 4 hours PRN Mild Pain (1 - 3)      Elderly WM in bed, sleeping. Opens eyes briefly, smiles, offers no complaints, and returned to sleep.

## 2017-09-14 NOTE — PROGRESS NOTE ADULT - SUBJECTIVE AND OBJECTIVE BOX
ACS Progress Note    S: Patient seen and examined in the morning, sleeping comfortably with unlabored breathing. Patient was again seen later in the afternoon where patient was groggy, but responding to voice and stimuli.     O:  Vital Signs Last 24 Hrs  T(C): 37 (13 Sep 2017 14:12), Max: 37 (13 Sep 2017 14:12)  T(F): 98.6 (13 Sep 2017 14:12), Max: 98.6 (13 Sep 2017 14:12)  HR: 91 (13 Sep 2017 14:12) (64 - 91)  BP: 167/75 (13 Sep 2017 14:12) (159/65 - 179/65)  RR: 18 (13 Sep 2017 14:12) (18 - 18)  SpO2: 95% (13 Sep 2017 14:12) (94% - 97%)    I&O's Detail    12 Sep 2017 07:01  -  13 Sep 2017 07:00  --------------------------------------------------------  IN:    IV PiggyBack: 50 mL    Oral Fluid: 665 mL  Total IN: 715 mL    OUT:    Voided: 300 mL  Total OUT: 300 mL    Total NET: 415 mL    13 Sep 2017 07:01  -  13 Sep 2017 14:57  --------------------------------------------------------  IN:    Oral Fluid: 350 mL  Total IN: 350 mL    OUT:    Voided: 200 mL  Total OUT: 200 mL    Total NET: 150 mL    MEDICATIONS  (STANDING):  enoxaparin Injectable 40 milliGRAM(s) SubCutaneous every 24 hours  citalopram 10 milliGRAM(s) Oral daily  donepezil 10 milliGRAM(s) Oral at bedtime  latanoprost 0.005% Ophthalmic Solution 1 Drop(s) Both EYES at bedtime  levothyroxine 50 MICROGram(s) Oral daily  lidocaine   Patch 1 Patch Transdermal every 24 hours  docusate sodium 100 milliGRAM(s) Oral three times a day  senna 2 Tablet(s) Oral at bedtime  tamsulosin 0.4 milliGRAM(s) Oral at bedtime  ALBUTerol/ipratropium for Nebulization 3 milliLiter(s) Nebulizer three times a day  thiamine 100 milliGRAM(s) Oral daily  cefTRIAXone   IVPB      cefTRIAXone   IVPB 1 Gram(s) IV Intermittent every 24 hours    MEDICATIONS  (PRN):  hydrALAZINE Injectable 10 milliGRAM(s) IV Push every 4 hours PRN Systolic greater than 180  acetaminophen   Tablet. 325 milliGRAM(s) Oral every 4 hours PRN Mild Pain (1 - 3)  QUEtiapine 25 milliGRAM(s) Oral every 8 hours PRN severe agitation    Physical Exam:  Gen: Laying in bed, a little groggy   Resp: Unlabored breathing  Abd: soft, NTND    Lines:   IV: patent, in place.

## 2017-09-14 NOTE — PROGRESS NOTE ADULT - ASSESSMENT
Dementia with behavioral disturbance  Sedation from Seroquel. Rule out infection (UA with 53 WBC and mod. leukoesterase)      Recommend  Lower prn Seroquel to 12.5mg q8h prn severe agitation  Check ROBERT, C&S    Stefan Santillan M.D.  Psychiatry  [phone]

## 2017-09-14 NOTE — PROGRESS NOTE ADULT - ASSESSMENT
Postraumatic (fall) multiple R rib fractures and large R lpneumothorax   Respansion R lung post R chest tube insertion  Dementia with agitation and lethargy: high aspiration risk   R arm tremors - unclear etiology  Doubt UTI or urosepsis    REC    Consider DC antibiotics  Add duoneb tid  Mobilize with PT as tolerated  DVT prophylaxis  DC planning primary team

## 2017-09-14 NOTE — PROGRESS NOTE ADULT - ASSESSMENT
87 y/o M w/ hx of CAD s/p PPM, dementia, glaucoma, hypothyroisim and parotid neoplasm who presented with R rib 5-8 fractures and pneumothorax after falling in bathtub. Patient has been having severe sundowning and becomes very agitated at night.     Plan:   - Psych reassessed patient and rec Lower prn Seroquel to 12.5mg q8h prn severe agitation.   - ceftriaxone 3-5 days for positive UA, can transition to levaquin if patient is ready for discharge (abx started 9/12), urine culture/sensitivity in case the patient has resistant organism that may be responsible for the mental status change.   - Duonebs TID per pulm recs  - PT, OOB as tolerated, IS as tolerated  - Pain control  - Holding metroprolol for bradycardia, VS q4h  - Regular diet  - DVT ppx   - Continue enhanced obs  - Dispo: family requesting [facility], which was recommended by PT

## 2017-09-14 NOTE — PROGRESS NOTE ADULT - SUBJECTIVE AND OBJECTIVE BOX
Follow-up Pulm Progress Note  Stefan Vides MD  [phone]    Alert and interactive today, yet confused  Afebrile on ceftriaxone No new resp issues: breathing comfortbly supine    Vital Signs Last 24 Hrs  T(C): 36.7 (14 Sep 2017 08:05), Max: 37.3 (13 Sep 2017 23:56)  T(F): 98.1 (14 Sep 2017 08:05), Max: 99.1 (13 Sep 2017 23:56)  HR: 66 (14 Sep 2017 12:13) (63 - 91)  BP: 134/71 (14 Sep 2017 12:13) (103/57 - 167/75)  BP(mean): --  RR: 18 (14 Sep 2017 12:13) (18 - 18)  SpO2: 95% (14 Sep 2017 12:13) (93% - 98%)      Physical Examination:  PULM: Clear without wheeze or rhonchi  CVS: Regular rate and rhythm, no murmurs, rubs, or gallops  ABD: Soft, non-tender  EXT:  No clubbing, cyanosis, or edema    RADIOLOGY REVIEWED  CXR:    CT chest:    TTE:

## 2017-09-14 NOTE — PROGRESS NOTE ADULT - ASSESSMENT
89 y/o M w/ hx of HTN HLD CAD s/p PPM, dementia aaox 1-2 at baseline, glaucoma, hypothyroidism and parotid neoplasm who presented s/p fall in the bathtub found to have  R rib 5-8 fractures and pneumothorax s/p chest tube and removal.   Pneumothorax resolved.        Rib Fx - pain controlled c/w incentive spirometry, PT eval and d/c planning      Bradycardia -resolved hr stable off bb currently     Bladder wall thickening / Cystitis - will continue with ceftriaxone.  Pyuria, + leuk esterase will limit course to 3-5 days.  c/w IV meds while inpt.   may switch to PO Levaquin 500mg daily upon DC.  urine culture at this point will be of little use as patient has been on antibiotics for some time now    HTN HLD CAD s/p PPM - bp minimally elevated, pain component  avoid aggressive control in elderly monitor c/w telemetry     Dementia /Delerium - Psy consult appreciated, c/w lower dose seroquel  thiamine      DVT PPX      stable for d/c to rehab from medical perspective

## 2017-09-14 NOTE — PROGRESS NOTE ADULT - ATTENDING COMMENTS
Pt seen and examined 9/14 at noon, agree with above. Dementia: stable, appreciate Psych recs. Hypothyroidism: continue Suynthroid. Dispo planning.

## 2017-09-14 NOTE — PROGRESS NOTE ADULT - SUBJECTIVE AND OBJECTIVE BOX
Patient is a 88y old  Male who presents with a chief complaint of unwitnessed fall, multiple rib fractures with ptx on xray (08 Sep 2017 11:31)      SUBJECTIVE / OVERNIGHT EVENTS:        Vital Signs Last 24 Hrs  T(C): 36.6 (14 Sep 2017 21:35), Max: 37.3 (13 Sep 2017 23:56)  T(F): 97.9 (14 Sep 2017 21:35), Max: 99.1 (13 Sep 2017 23:56)  HR: 66 (14 Sep 2017 21:35) (63 - 81)  BP: 119/66 (14 Sep 2017 21:35) (103/57 - 162/65)  BP(mean): --  RR: 18 (14 Sep 2017 21:35) (18 - 18)  SpO2: 97% (14 Sep 2017 21:35) (93% - 98%)  I&O's Summary    13 Sep 2017 07:01  -  14 Sep 2017 07:00  --------------------------------------------------------  IN: 1180 mL / OUT: 200 mL / NET: 980 mL    14 Sep 2017 07:01  -  14 Sep 2017 22:25  --------------------------------------------------------  IN: 225 mL / OUT: 500 mL / NET: -275 mL        GENERAL: NAD, AAOx0  HEAD:  Atraumatic, Normocephalic  EYES: EOMI, PERRLA, conjunctiva and sclera clear  NECK: Supple, No JVD, No LAD  CHEST/LUNG: Clear to auscultation bilaterally; No wheeze  HEART: Regular rate and rhythm; No murmurs, rubs, or gallops  ABDOMEN: Soft, Nontender, Nondistended; Bowel sounds present  EXTREMITIES:  2+ Peripheral Pulses, No clubbing, cyanosis, or edema  SKIN: No rashes or lesions        LABS:            CAPILLARY BLOOD GLUCOSE                RADIOLOGY & ADDITIONAL TESTS:    Imaging Personally Reviewed:  [x] YES  [ ] NO    Consultant(s) Notes Reviewed:  [x] YES  [ ] NO      MEDICATIONS  (STANDING):  enoxaparin Injectable 40 milliGRAM(s) SubCutaneous every 24 hours  citalopram 10 milliGRAM(s) Oral daily  donepezil 10 milliGRAM(s) Oral at bedtime  latanoprost 0.005% Ophthalmic Solution 1 Drop(s) Both EYES at bedtime  levothyroxine 50 MICROGram(s) Oral daily  lidocaine   Patch 1 Patch Transdermal every 24 hours  docusate sodium 100 milliGRAM(s) Oral three times a day  senna 2 Tablet(s) Oral at bedtime  tamsulosin 0.4 milliGRAM(s) Oral at bedtime  ALBUTerol/ipratropium for Nebulization 3 milliLiter(s) Nebulizer three times a day  thiamine 100 milliGRAM(s) Oral daily  cefTRIAXone   IVPB      cefTRIAXone   IVPB 1 Gram(s) IV Intermittent every 24 hours    MEDICATIONS  (PRN):  hydrALAZINE Injectable 10 milliGRAM(s) IV Push every 4 hours PRN Systolic greater than 180  acetaminophen   Tablet. 325 milliGRAM(s) Oral every 4 hours PRN Mild Pain (1 - 3)  QUEtiapine 12.5 milliGRAM(s) Oral every 8 hours PRN agitation      Care Discussed with Consultants/Other Providers [x] YES  [ ] NO    HEALTH ISSUES - PROBLEM Dx:  Hematuria: Hematuria

## 2017-09-15 LAB
ANION GAP SERPL CALC-SCNC: 13 MMOL/L — SIGNIFICANT CHANGE UP (ref 5–17)
ANION GAP SERPL CALC-SCNC: 14 MMOL/L — SIGNIFICANT CHANGE UP (ref 5–17)
ANION GAP SERPL CALC-SCNC: 19 MMOL/L — HIGH (ref 5–17)
APPEARANCE UR: CLEAR — SIGNIFICANT CHANGE UP
APTT BLD: 27.4 SEC — LOW (ref 27.5–37.4)
APTT BLD: 28.4 SEC — SIGNIFICANT CHANGE UP (ref 27.5–37.4)
BACTERIA # UR AUTO: NEGATIVE — SIGNIFICANT CHANGE UP
BILIRUB UR-MCNC: NEGATIVE — SIGNIFICANT CHANGE UP
BLD GP AB SCN SERPL QL: NEGATIVE — SIGNIFICANT CHANGE UP
BLD GP AB SCN SERPL QL: NEGATIVE — SIGNIFICANT CHANGE UP
BUN SERPL-MCNC: 46 MG/DL — HIGH (ref 7–23)
BUN SERPL-MCNC: 48 MG/DL — HIGH (ref 7–23)
BUN SERPL-MCNC: 50 MG/DL — HIGH (ref 7–23)
BUN SERPL-MCNC: 53 MG/DL — HIGH (ref 7–23)
BUN SERPL-MCNC: 55 MG/DL — HIGH (ref 7–23)
CALCIUM SERPL-MCNC: 8.5 MG/DL — SIGNIFICANT CHANGE UP (ref 8.4–10.5)
CALCIUM SERPL-MCNC: 8.9 MG/DL — SIGNIFICANT CHANGE UP (ref 8.4–10.5)
CALCIUM SERPL-MCNC: 9.3 MG/DL — SIGNIFICANT CHANGE UP (ref 8.4–10.5)
CALCIUM SERPL-MCNC: 9.5 MG/DL — SIGNIFICANT CHANGE UP (ref 8.4–10.5)
CALCIUM SERPL-MCNC: 9.6 MG/DL — SIGNIFICANT CHANGE UP (ref 8.4–10.5)
CHLORIDE SERPL-SCNC: 110 MMOL/L — HIGH (ref 96–108)
CHLORIDE SERPL-SCNC: 110 MMOL/L — HIGH (ref 96–108)
CHLORIDE SERPL-SCNC: 111 MMOL/L — HIGH (ref 96–108)
CHLORIDE SERPL-SCNC: 112 MMOL/L — HIGH (ref 96–108)
CHLORIDE SERPL-SCNC: 114 MMOL/L — HIGH (ref 96–108)
CO2 SERPL-SCNC: 18 MMOL/L — LOW (ref 22–31)
CO2 SERPL-SCNC: 21 MMOL/L — LOW (ref 22–31)
CO2 SERPL-SCNC: 25 MMOL/L — SIGNIFICANT CHANGE UP (ref 22–31)
COLOR SPEC: YELLOW — SIGNIFICANT CHANGE UP
CREAT SERPL-MCNC: 1.02 MG/DL — SIGNIFICANT CHANGE UP (ref 0.5–1.3)
CREAT SERPL-MCNC: 1.06 MG/DL — SIGNIFICANT CHANGE UP (ref 0.5–1.3)
CREAT SERPL-MCNC: 1.08 MG/DL — SIGNIFICANT CHANGE UP (ref 0.5–1.3)
CREAT SERPL-MCNC: 1.14 MG/DL — SIGNIFICANT CHANGE UP (ref 0.5–1.3)
CREAT SERPL-MCNC: 1.15 MG/DL — SIGNIFICANT CHANGE UP (ref 0.5–1.3)
CULTURE RESULTS: NO GROWTH — SIGNIFICANT CHANGE UP
DIFF PNL FLD: ABNORMAL
EPI CELLS # UR: 3 /HPF — SIGNIFICANT CHANGE UP (ref 0–5)
GAS PNL BLDA: SIGNIFICANT CHANGE UP
GAS PNL BLDV: SIGNIFICANT CHANGE UP
GLUCOSE SERPL-MCNC: 112 MG/DL — HIGH (ref 70–99)
GLUCOSE SERPL-MCNC: 124 MG/DL — HIGH (ref 70–99)
GLUCOSE SERPL-MCNC: 153 MG/DL — HIGH (ref 70–99)
GLUCOSE SERPL-MCNC: 161 MG/DL — HIGH (ref 70–99)
GLUCOSE SERPL-MCNC: 233 MG/DL — HIGH (ref 70–99)
GLUCOSE UR QL: NEGATIVE MG/DL — SIGNIFICANT CHANGE UP
HCT VFR BLD CALC: 18 % — CRITICAL LOW (ref 39–50)
HCT VFR BLD CALC: 21.8 % — LOW (ref 39–50)
HCT VFR BLD CALC: 23 % — LOW (ref 39–50)
HCT VFR BLD CALC: 23.7 % — LOW (ref 39–50)
HCT VFR BLD CALC: 24.5 % — LOW (ref 39–50)
HCT VFR BLD CALC: 25.8 % — LOW (ref 39–50)
HGB BLD-MCNC: 6.1 G/DL — CRITICAL LOW (ref 13–17)
HGB BLD-MCNC: 7.2 G/DL — LOW (ref 13–17)
HGB BLD-MCNC: 7.6 G/DL — LOW (ref 13–17)
HGB BLD-MCNC: 8.1 G/DL — LOW (ref 13–17)
HGB BLD-MCNC: 8.3 G/DL — LOW (ref 13–17)
HGB BLD-MCNC: 8.9 G/DL — LOW (ref 13–17)
HYALINE CASTS # UR AUTO: 5 /LPF — SIGNIFICANT CHANGE UP (ref 0–7)
INR BLD: 1.33 RATIO — HIGH (ref 0.88–1.16)
INR BLD: 1.36 RATIO — HIGH (ref 0.88–1.16)
KETONES UR-MCNC: ABNORMAL
LEUKOCYTE ESTERASE UR-ACNC: ABNORMAL
MAGNESIUM SERPL-MCNC: 2.1 MG/DL — SIGNIFICANT CHANGE UP (ref 1.6–2.6)
MCHC RBC-ENTMCNC: 32.3 PG — SIGNIFICANT CHANGE UP (ref 27–34)
MCHC RBC-ENTMCNC: 32.5 PG — SIGNIFICANT CHANGE UP (ref 27–34)
MCHC RBC-ENTMCNC: 33 GM/DL — SIGNIFICANT CHANGE UP (ref 32–36)
MCHC RBC-ENTMCNC: 33.2 GM/DL — SIGNIFICANT CHANGE UP (ref 32–36)
MCHC RBC-ENTMCNC: 33.2 PG — SIGNIFICANT CHANGE UP (ref 27–34)
MCHC RBC-ENTMCNC: 33.9 GM/DL — SIGNIFICANT CHANGE UP (ref 32–36)
MCHC RBC-ENTMCNC: 33.9 GM/DL — SIGNIFICANT CHANGE UP (ref 32–36)
MCHC RBC-ENTMCNC: 34.1 GM/DL — SIGNIFICANT CHANGE UP (ref 32–36)
MCHC RBC-ENTMCNC: 34.1 PG — HIGH (ref 27–34)
MCHC RBC-ENTMCNC: 34.4 PG — HIGH (ref 27–34)
MCHC RBC-ENTMCNC: 34.6 PG — HIGH (ref 27–34)
MCHC RBC-ENTMCNC: 34.7 GM/DL — SIGNIFICANT CHANGE UP (ref 32–36)
MCV RBC AUTO: 102 FL — HIGH (ref 80–100)
MCV RBC AUTO: 102 FL — HIGH (ref 80–100)
MCV RBC AUTO: 103 FL — HIGH (ref 80–100)
MCV RBC AUTO: 95.3 FL — SIGNIFICANT CHANGE UP (ref 80–100)
MCV RBC AUTO: 95.7 FL — SIGNIFICANT CHANGE UP (ref 80–100)
MCV RBC AUTO: 97.8 FL — SIGNIFICANT CHANGE UP (ref 80–100)
NITRITE UR-MCNC: NEGATIVE — SIGNIFICANT CHANGE UP
PH UR: 5.5 — SIGNIFICANT CHANGE UP (ref 5–8)
PHOSPHATE SERPL-MCNC: 2.8 MG/DL — SIGNIFICANT CHANGE UP (ref 2.5–4.5)
PLATELET # BLD AUTO: 239 K/UL — SIGNIFICANT CHANGE UP (ref 150–400)
PLATELET # BLD AUTO: 263 K/UL — SIGNIFICANT CHANGE UP (ref 150–400)
PLATELET # BLD AUTO: 282 K/UL — SIGNIFICANT CHANGE UP (ref 150–400)
PLATELET # BLD AUTO: 352 K/UL — SIGNIFICANT CHANGE UP (ref 150–400)
PLATELET # BLD AUTO: 357 K/UL — SIGNIFICANT CHANGE UP (ref 150–400)
PLATELET # BLD AUTO: 360 K/UL — SIGNIFICANT CHANGE UP (ref 150–400)
POTASSIUM SERPL-MCNC: 3.8 MMOL/L — SIGNIFICANT CHANGE UP (ref 3.5–5.3)
POTASSIUM SERPL-MCNC: 3.9 MMOL/L — SIGNIFICANT CHANGE UP (ref 3.5–5.3)
POTASSIUM SERPL-MCNC: 4 MMOL/L — SIGNIFICANT CHANGE UP (ref 3.5–5.3)
POTASSIUM SERPL-MCNC: 4.1 MMOL/L — SIGNIFICANT CHANGE UP (ref 3.5–5.3)
POTASSIUM SERPL-MCNC: 4.2 MMOL/L — SIGNIFICANT CHANGE UP (ref 3.5–5.3)
POTASSIUM SERPL-SCNC: 3.8 MMOL/L — SIGNIFICANT CHANGE UP (ref 3.5–5.3)
POTASSIUM SERPL-SCNC: 3.9 MMOL/L — SIGNIFICANT CHANGE UP (ref 3.5–5.3)
POTASSIUM SERPL-SCNC: 4 MMOL/L — SIGNIFICANT CHANGE UP (ref 3.5–5.3)
POTASSIUM SERPL-SCNC: 4.1 MMOL/L — SIGNIFICANT CHANGE UP (ref 3.5–5.3)
POTASSIUM SERPL-SCNC: 4.2 MMOL/L — SIGNIFICANT CHANGE UP (ref 3.5–5.3)
PROT UR-MCNC: ABNORMAL MG/DL
PROTHROM AB SERPL-ACNC: 14.4 SEC — HIGH (ref 9.8–12.7)
PROTHROM AB SERPL-ACNC: 14.8 SEC — HIGH (ref 9.8–12.7)
RBC # BLD: 1.76 M/UL — LOW (ref 4.2–5.8)
RBC # BLD: 2.23 M/UL — LOW (ref 4.2–5.8)
RBC # BLD: 2.24 M/UL — LOW (ref 4.2–5.8)
RBC # BLD: 2.41 M/UL — LOW (ref 4.2–5.8)
RBC # BLD: 2.49 M/UL — LOW (ref 4.2–5.8)
RBC # BLD: 2.69 M/UL — LOW (ref 4.2–5.8)
RBC # FLD: 12 % — SIGNIFICANT CHANGE UP (ref 10.3–14.5)
RBC # FLD: 13.7 % — SIGNIFICANT CHANGE UP (ref 10.3–14.5)
RBC # FLD: 13.7 % — SIGNIFICANT CHANGE UP (ref 10.3–14.5)
RBC # FLD: 14 % — SIGNIFICANT CHANGE UP (ref 10.3–14.5)
RBC CASTS # UR COMP ASSIST: 28 /HPF — HIGH (ref 0–4)
RH IG SCN BLD-IMP: POSITIVE — SIGNIFICANT CHANGE UP
RH IG SCN BLD-IMP: POSITIVE — SIGNIFICANT CHANGE UP
SODIUM SERPL-SCNC: 147 MMOL/L — HIGH (ref 135–145)
SODIUM SERPL-SCNC: 148 MMOL/L — HIGH (ref 135–145)
SODIUM SERPL-SCNC: 149 MMOL/L — HIGH (ref 135–145)
SODIUM SERPL-SCNC: 149 MMOL/L — HIGH (ref 135–145)
SODIUM SERPL-SCNC: 150 MMOL/L — HIGH (ref 135–145)
SP GR SPEC: 1.03 — HIGH (ref 1.01–1.02)
SPECIMEN SOURCE: SIGNIFICANT CHANGE UP
UROBILINOGEN FLD QL: NEGATIVE MG/DL — SIGNIFICANT CHANGE UP
WBC # BLD: 10.94 K/UL — HIGH (ref 3.8–10.5)
WBC # BLD: 14.2 K/UL — HIGH (ref 3.8–10.5)
WBC # BLD: 14.7 K/UL — HIGH (ref 3.8–10.5)
WBC # BLD: 15.5 K/UL — HIGH (ref 3.8–10.5)
WBC # BLD: 15.9 K/UL — HIGH (ref 3.8–10.5)
WBC # BLD: 20.3 K/UL — HIGH (ref 3.8–10.5)
WBC # FLD AUTO: 10.94 K/UL — HIGH (ref 3.8–10.5)
WBC # FLD AUTO: 14.2 K/UL — HIGH (ref 3.8–10.5)
WBC # FLD AUTO: 14.7 K/UL — HIGH (ref 3.8–10.5)
WBC # FLD AUTO: 15.5 K/UL — HIGH (ref 3.8–10.5)
WBC # FLD AUTO: 15.9 K/UL — HIGH (ref 3.8–10.5)
WBC # FLD AUTO: 20.3 K/UL — HIGH (ref 3.8–10.5)
WBC UR QL: 4 /HPF — SIGNIFICANT CHANGE UP (ref 0–5)

## 2017-09-15 PROCEDURE — 99233 SBSQ HOSP IP/OBS HIGH 50: CPT

## 2017-09-15 PROCEDURE — 71010: CPT | Mod: 26

## 2017-09-15 PROCEDURE — 99291 CRITICAL CARE FIRST HOUR: CPT

## 2017-09-15 RX ORDER — SODIUM CHLORIDE 9 MG/ML
1000 INJECTION, SOLUTION INTRAVENOUS
Qty: 0 | Refills: 0 | Status: DISCONTINUED | OUTPATIENT
Start: 2017-09-15 | End: 2017-09-16

## 2017-09-15 RX ORDER — PANTOPRAZOLE SODIUM 20 MG/1
8 TABLET, DELAYED RELEASE ORAL
Qty: 80 | Refills: 0 | Status: DISCONTINUED | OUTPATIENT
Start: 2017-09-15 | End: 2017-09-18

## 2017-09-15 RX ADMIN — ENOXAPARIN SODIUM 40 MILLIGRAM(S): 100 INJECTION SUBCUTANEOUS at 11:53

## 2017-09-15 RX ADMIN — Medication 325 MILLIGRAM(S): at 01:48

## 2017-09-15 RX ADMIN — Medication 3 MILLILITER(S): at 21:12

## 2017-09-15 RX ADMIN — LATANOPROST 1 DROP(S): 0.05 SOLUTION/ DROPS OPHTHALMIC; TOPICAL at 22:11

## 2017-09-15 RX ADMIN — QUETIAPINE FUMARATE 12.5 MILLIGRAM(S): 200 TABLET, FILM COATED ORAL at 10:33

## 2017-09-15 RX ADMIN — Medication 3 MILLILITER(S): at 06:58

## 2017-09-15 RX ADMIN — CEFTRIAXONE 100 GRAM(S): 500 INJECTION, POWDER, FOR SOLUTION INTRAMUSCULAR; INTRAVENOUS at 11:52

## 2017-09-15 RX ADMIN — Medication 50 MICROGRAM(S): at 06:58

## 2017-09-15 RX ADMIN — Medication 100 MILLIGRAM(S): at 11:57

## 2017-09-15 RX ADMIN — Medication 100 MILLIGRAM(S): at 06:58

## 2017-09-15 RX ADMIN — LIDOCAINE 1 PATCH: 4 CREAM TOPICAL at 01:48

## 2017-09-15 RX ADMIN — CITALOPRAM 10 MILLIGRAM(S): 10 TABLET, FILM COATED ORAL at 11:57

## 2017-09-15 RX ADMIN — LIDOCAINE 1 PATCH: 4 CREAM TOPICAL at 11:56

## 2017-09-15 RX ADMIN — LIDOCAINE 1 PATCH: 4 CREAM TOPICAL at 23:18

## 2017-09-15 NOTE — PROGRESS NOTE ADULT - ASSESSMENT
87 y/o M w/ hx of CAD s/p PPM, dementia, glaucoma, hypothyroisim and parotid neoplasm who presented with R rib 5-8 fractures and pneumothorax after falling in bathtub. Patient has been having severe sundowning and becomes very agitated at night.     Plan:   - Psych reassessed patient and rec holding Seroquel unless there is very severe agitation  - ceftriaxone 3-5 days for positive UA, can transition to levaquin if patient is ready for discharge (abx started 9/12), f/u urine culture/sensitivity in case the patient has resistant organism that may be responsible for the mental status change.   - Duonebs TID per pulm recs  - PT, OOB as tolerated, IS as tolerated  - Pain control  - Holding metroprolol for bradycardia, VS q4h  - Regular diet  - DVT ppx   - Continue enhanced obs  - Dispo: family wants home with home services    Antwon Marie MD

## 2017-09-15 NOTE — PROGRESS NOTE ADULT - SUBJECTIVE AND OBJECTIVE BOX
Events noted. Per his RN, more alert today though still closes his eyes often. Took his meds. He needed no prn Seroquel. His wife and daughter last night told me that the pt talks to imaginary people in the room over the past few days. This is a change in his MS.       Vital Signs Last 24 Hrs  T(C): 36.7 (15 Sep 2017 08:00), Max: 36.8 (14 Sep 2017 16:35)  T(F): 98.1 (15 Sep 2017 08:00), Max: 98.2 (14 Sep 2017 16:35)  HR: 74 (15 Sep 2017 08:00) (65 - 75)  BP: 129/64 (15 Sep 2017 08:00) (108/67 - 152/67)  BP(mean): --  RR: 16 (15 Sep 2017 08:00) (16 - 18)  SpO2: 95% (15 Sep 2017 08:00) (95% - 97%)        Urinalysis (09.15.17 @ 03:23)    Glucose Qualitative, Urine: Negative mg/dL    Blood, Urine: Small    pH Urine: 5.5    Color: Yellow    Urine Appearance: Clear    Bilirubin: Negative    Ketone - Urine: Small    Specific Gravity: 1.029    Protein, Urine: Trace mg/dL    Urobilinogen: Negative mg/dL    Nitrite: Negative    Leukocyte Esterase Concentration: Small                    MEDICATIONS  (STANDING):  enoxaparin Injectable 40 milliGRAM(s) SubCutaneous every 24 hours  citalopram 10 milliGRAM(s) Oral daily  donepezil 10 milliGRAM(s) Oral at bedtime  latanoprost 0.005% Ophthalmic Solution 1 Drop(s) Both EYES at bedtime  levothyroxine 50 MICROGram(s) Oral daily  lidocaine   Patch 1 Patch Transdermal every 24 hours  docusate sodium 100 milliGRAM(s) Oral three times a day  senna 2 Tablet(s) Oral at bedtime  tamsulosin 0.4 milliGRAM(s) Oral at bedtime  ALBUTerol/ipratropium for Nebulization 3 milliLiter(s) Nebulizer three times a day  thiamine 100 milliGRAM(s) Oral daily  cefTRIAXone   IVPB      cefTRIAXone   IVPB 1 Gram(s) IV Intermittent every 24 hours    MEDICATIONS  (PRN):  hydrALAZINE Injectable 10 milliGRAM(s) IV Push every 4 hours PRN Systolic greater than 180  acetaminophen   Tablet. 325 milliGRAM(s) Oral every 4 hours PRN Mild Pain (1 - 3)  QUEtiapine 12.5 milliGRAM(s) Oral every 8 hours PRN agitation      Elderly WM in bed, eyes closed but opened eyes to touch and smiled, spoke for a few seconds incoherently, and then returned to close his eyes and not answer me.

## 2017-09-15 NOTE — PROGRESS NOTE ADULT - ASSESSMENT
Postraumatic (fall) multiple R rib fractures and large R lpneumothorax   Respansion R lung post R chest tube insertion  Dementia with agitation and lethargy: high aspiration risk   R arm tremors - unclear etiology  Doubt  urosepsis    REC    Add duoneb tid  Mobilize with PT as tolerated  DVT prophylaxis  DC planning primary team

## 2017-09-15 NOTE — PROGRESS NOTE ADULT - ASSESSMENT
Dementia. Possible delirium    Recommend  Hold prn Seroquel unless he is extremely agitated.  Followup CBC  Enhanced supervision    Stefan Santillan M.D.  Psychiatry  [phone]

## 2017-09-15 NOTE — PROGRESS NOTE ADULT - ATTENDING COMMENTS
Pt seen and examined 9/15 at 9pm, agree with above. Pt s/p RRT after becoming lethargic with a large, bloody bowel movement. Acute posthemorrhagic anemia from GIB, received transfusion. Lactic acidosis responded to resuscitation. Protonix gtt. I discussed plan for GI consultation for likely EGD, transfusion, frequent CBC monitoring, with pt's son and daughter.

## 2017-09-15 NOTE — PROGRESS NOTE ADULT - SUBJECTIVE AND OBJECTIVE BOX
Follow-up Pulm Progress Note  Stefan Vides MD  [phone]    Breathing comfortably supine  Afebrile and hemodynamically stable  Incr WBC on ceftriaxone for putative UTI    Vital Signs Last 24 Hrs  T(C): 36.7 (14 Sep 2017 08:05), Max: 37.3 (13 Sep 2017 23:56)  T(F): 98.1 (14 Sep 2017 08:05), Max: 99.1 (13 Sep 2017 23:56)  HR: 66 (14 Sep 2017 12:13) (63 - 91)  BP: 134/71 (14 Sep 2017 12:13) (103/57 - 167/75)  BP(mean): --  RR: 18 (14 Sep 2017 12:13) (18 - 18)  SpO2: 95% (14 Sep 2017 12:13) (93% - 98%)      Physical Examination:  PULM: Clear without wheeze or rhonchi  CVS: Regular rate and rhythm, no murmurs, rubs, or gallops  ABD: Soft, non-tender  EXT:  No clubbing, cyanosis, or edema    RADIOLOGY REVIEWED  CXR:    CT chest:    TTE:

## 2017-09-15 NOTE — PROGRESS NOTE ADULT - SUBJECTIVE AND OBJECTIVE BOX
ACS Progress Note    S: Patient seen this AM. Was agitated last night, but less so than previous nights, but family still says he is hallucinating. Resting comfortably now.    O:  Vital Signs Last 24 Hrs  T(C): 36.7 (15 Sep 2017 08:00), Max: 36.8 (14 Sep 2017 16:35)  T(F): 98.1 (15 Sep 2017 08:00), Max: 98.2 (14 Sep 2017 16:35)  HR: 74 (15 Sep 2017 08:00) (65 - 75)  BP: 129/64 (15 Sep 2017 08:00) (108/67 - 152/67)  RR: 16 (15 Sep 2017 08:00) (16 - 18)  SpO2: 95% (15 Sep 2017 08:00) (95% - 97%)    I&O's Detail    14 Sep 2017 07:01  -  15 Sep 2017 07:00  --------------------------------------------------------  IN:    IV PiggyBack: 50 mL    Oral Fluid: 175 mL  Total IN: 225 mL    OUT:    Intermittent Catheterization - Urethral: 500 mL  Total OUT: 500 mL    Total NET: -275 mL    MEDICATIONS  (STANDING):  enoxaparin Injectable 40 milliGRAM(s) SubCutaneous every 24 hours  citalopram 10 milliGRAM(s) Oral daily  donepezil 10 milliGRAM(s) Oral at bedtime  latanoprost 0.005% Ophthalmic Solution 1 Drop(s) Both EYES at bedtime  levothyroxine 50 MICROGram(s) Oral daily  lidocaine   Patch 1 Patch Transdermal every 24 hours  docusate sodium 100 milliGRAM(s) Oral three times a day  senna 2 Tablet(s) Oral at bedtime  tamsulosin 0.4 milliGRAM(s) Oral at bedtime  ALBUTerol/ipratropium for Nebulization 3 milliLiter(s) Nebulizer three times a day  thiamine 100 milliGRAM(s) Oral daily  cefTRIAXone   IVPB      cefTRIAXone   IVPB 1 Gram(s) IV Intermittent every 24 hours    MEDICATIONS  (PRN):  hydrALAZINE Injectable 10 milliGRAM(s) IV Push every 4 hours PRN Systolic greater than 180  acetaminophen   Tablet. 325 milliGRAM(s) Oral every 4 hours PRN Mild Pain (1 - 3)  QUEtiapine 12.5 milliGRAM(s) Oral every 8 hours PRN agitation    Physical Exam:  Gen: resting comfortably  Resp: breathing without distress    Lines:   IV: patent, in place.

## 2017-09-16 ENCOUNTER — RESULT REVIEW (OUTPATIENT)
Age: 82
End: 2017-09-16

## 2017-09-16 LAB
ANION GAP SERPL CALC-SCNC: 12 MMOL/L — SIGNIFICANT CHANGE UP (ref 5–17)
APTT BLD: 27.5 SEC — SIGNIFICANT CHANGE UP (ref 27.5–37.4)
BUN SERPL-MCNC: 54 MG/DL — HIGH (ref 7–23)
CA-I BLD-SCNC: 1.24 MMOL/L — SIGNIFICANT CHANGE UP (ref 1.12–1.3)
CALCIUM SERPL-MCNC: 8.5 MG/DL — SIGNIFICANT CHANGE UP (ref 8.4–10.5)
CHLORIDE SERPL-SCNC: 115 MMOL/L — HIGH (ref 96–108)
CO2 SERPL-SCNC: 23 MMOL/L — SIGNIFICANT CHANGE UP (ref 22–31)
CREAT SERPL-MCNC: 1.14 MG/DL — SIGNIFICANT CHANGE UP (ref 0.5–1.3)
GAS PNL BLDA: SIGNIFICANT CHANGE UP
GLUCOSE SERPL-MCNC: 118 MG/DL — HIGH (ref 70–99)
HCT VFR BLD CALC: 20.7 % — CRITICAL LOW (ref 39–50)
HCT VFR BLD CALC: 22.5 % — LOW (ref 39–50)
HCT VFR BLD CALC: 22.7 % — LOW (ref 39–50)
HCT VFR BLD CALC: 23.4 % — LOW (ref 39–50)
HCT VFR BLD CALC: 24.5 % — LOW (ref 39–50)
HCT VFR BLD CALC: 25 % — LOW (ref 39–50)
HGB BLD-MCNC: 7.3 G/DL — LOW (ref 13–17)
HGB BLD-MCNC: 7.8 G/DL — LOW (ref 13–17)
HGB BLD-MCNC: 7.9 G/DL — LOW (ref 13–17)
HGB BLD-MCNC: 7.9 G/DL — LOW (ref 13–17)
HGB BLD-MCNC: 8.6 G/DL — LOW (ref 13–17)
HGB BLD-MCNC: 8.6 G/DL — LOW (ref 13–17)
INR BLD: 1.32 RATIO — HIGH (ref 0.88–1.16)
MAGNESIUM SERPL-MCNC: 2.2 MG/DL — SIGNIFICANT CHANGE UP (ref 1.6–2.6)
MCHC RBC-ENTMCNC: 31.8 PG — SIGNIFICANT CHANGE UP (ref 27–34)
MCHC RBC-ENTMCNC: 32.1 PG — SIGNIFICANT CHANGE UP (ref 27–34)
MCHC RBC-ENTMCNC: 32.2 PG — SIGNIFICANT CHANGE UP (ref 27–34)
MCHC RBC-ENTMCNC: 32.6 PG — SIGNIFICANT CHANGE UP (ref 27–34)
MCHC RBC-ENTMCNC: 33.3 PG — SIGNIFICANT CHANGE UP (ref 27–34)
MCHC RBC-ENTMCNC: 33.4 PG — SIGNIFICANT CHANGE UP (ref 27–34)
MCHC RBC-ENTMCNC: 33.9 GM/DL — SIGNIFICANT CHANGE UP (ref 32–36)
MCHC RBC-ENTMCNC: 34.5 GM/DL — SIGNIFICANT CHANGE UP (ref 32–36)
MCHC RBC-ENTMCNC: 34.5 GM/DL — SIGNIFICANT CHANGE UP (ref 32–36)
MCHC RBC-ENTMCNC: 35 GM/DL — SIGNIFICANT CHANGE UP (ref 32–36)
MCHC RBC-ENTMCNC: 35.1 GM/DL — SIGNIFICANT CHANGE UP (ref 32–36)
MCHC RBC-ENTMCNC: 35.3 GM/DL — SIGNIFICANT CHANGE UP (ref 32–36)
MCV RBC AUTO: 92.4 FL — SIGNIFICANT CHANGE UP (ref 80–100)
MCV RBC AUTO: 92.8 FL — SIGNIFICANT CHANGE UP (ref 80–100)
MCV RBC AUTO: 93.4 FL — SIGNIFICANT CHANGE UP (ref 80–100)
MCV RBC AUTO: 93.7 FL — SIGNIFICANT CHANGE UP (ref 80–100)
MCV RBC AUTO: 95 FL — SIGNIFICANT CHANGE UP (ref 80–100)
MCV RBC AUTO: 95.1 FL — SIGNIFICANT CHANGE UP (ref 80–100)
PHOSPHATE SERPL-MCNC: 2.9 MG/DL — SIGNIFICANT CHANGE UP (ref 2.5–4.5)
PLATELET # BLD AUTO: 214 K/UL — SIGNIFICANT CHANGE UP (ref 150–400)
PLATELET # BLD AUTO: 227 K/UL — SIGNIFICANT CHANGE UP (ref 150–400)
PLATELET # BLD AUTO: 233 K/UL — SIGNIFICANT CHANGE UP (ref 150–400)
PLATELET # BLD AUTO: 238 K/UL — SIGNIFICANT CHANGE UP (ref 150–400)
PLATELET # BLD AUTO: 242 K/UL — SIGNIFICANT CHANGE UP (ref 150–400)
PLATELET # BLD AUTO: 242 K/UL — SIGNIFICANT CHANGE UP (ref 150–400)
POTASSIUM SERPL-MCNC: 4 MMOL/L — SIGNIFICANT CHANGE UP (ref 3.5–5.3)
POTASSIUM SERPL-SCNC: 4 MMOL/L — SIGNIFICANT CHANGE UP (ref 3.5–5.3)
PROTHROM AB SERPL-ACNC: 14.3 SEC — HIGH (ref 9.8–12.7)
RBC # BLD: 2.18 M/UL — LOW (ref 4.2–5.8)
RBC # BLD: 2.36 M/UL — LOW (ref 4.2–5.8)
RBC # BLD: 2.43 M/UL — LOW (ref 4.2–5.8)
RBC # BLD: 2.49 M/UL — LOW (ref 4.2–5.8)
RBC # BLD: 2.65 M/UL — LOW (ref 4.2–5.8)
RBC # BLD: 2.69 M/UL — LOW (ref 4.2–5.8)
RBC # FLD: 14.3 % — SIGNIFICANT CHANGE UP (ref 10.3–14.5)
RBC # FLD: 14.6 % — HIGH (ref 10.3–14.5)
RBC # FLD: 14.8 % — HIGH (ref 10.3–14.5)
RBC # FLD: 15 % — HIGH (ref 10.3–14.5)
RBC # FLD: 15.1 % — HIGH (ref 10.3–14.5)
RBC # FLD: 15.2 % — HIGH (ref 10.3–14.5)
SODIUM SERPL-SCNC: 150 MMOL/L — HIGH (ref 135–145)
WBC # BLD: 12.9 K/UL — HIGH (ref 3.8–10.5)
WBC # BLD: 13.2 K/UL — HIGH (ref 3.8–10.5)
WBC # BLD: 13.4 K/UL — HIGH (ref 3.8–10.5)
WBC # BLD: 15.1 K/UL — HIGH (ref 3.8–10.5)
WBC # FLD AUTO: 12.9 K/UL — HIGH (ref 3.8–10.5)
WBC # FLD AUTO: 13.2 K/UL — HIGH (ref 3.8–10.5)
WBC # FLD AUTO: 13.4 K/UL — HIGH (ref 3.8–10.5)
WBC # FLD AUTO: 15.1 K/UL — HIGH (ref 3.8–10.5)

## 2017-09-16 PROCEDURE — 43255 EGD CONTROL BLEEDING ANY: CPT | Mod: 59,GC

## 2017-09-16 PROCEDURE — 88305 TISSUE EXAM BY PATHOLOGIST: CPT | Mod: 26

## 2017-09-16 PROCEDURE — 99291 CRITICAL CARE FIRST HOUR: CPT

## 2017-09-16 PROCEDURE — 99223 1ST HOSP IP/OBS HIGH 75: CPT | Mod: 25,GC

## 2017-09-16 PROCEDURE — 88312 SPECIAL STAINS GROUP 1: CPT | Mod: 26

## 2017-09-16 PROCEDURE — 43239 EGD BIOPSY SINGLE/MULTIPLE: CPT | Mod: GC

## 2017-09-16 PROCEDURE — 71010: CPT | Mod: 26

## 2017-09-16 RX ORDER — MIDAZOLAM HYDROCHLORIDE 1 MG/ML
4 INJECTION, SOLUTION INTRAMUSCULAR; INTRAVENOUS ONCE
Qty: 0 | Refills: 0 | Status: DISCONTINUED | OUTPATIENT
Start: 2017-09-16 | End: 2017-09-16

## 2017-09-16 RX ORDER — MIDAZOLAM HYDROCHLORIDE 1 MG/ML
2 INJECTION, SOLUTION INTRAMUSCULAR; INTRAVENOUS ONCE
Qty: 0 | Refills: 0 | Status: DISCONTINUED | OUTPATIENT
Start: 2017-09-16 | End: 2017-09-16

## 2017-09-16 RX ORDER — SODIUM CHLORIDE 9 MG/ML
1000 INJECTION, SOLUTION INTRAVENOUS
Qty: 0 | Refills: 0 | Status: DISCONTINUED | OUTPATIENT
Start: 2017-09-16 | End: 2017-09-18

## 2017-09-16 RX ORDER — ACETAMINOPHEN 500 MG
1000 TABLET ORAL ONCE
Qty: 0 | Refills: 0 | Status: COMPLETED | OUTPATIENT
Start: 2017-09-16 | End: 2017-09-16

## 2017-09-16 RX ORDER — FENTANYL CITRATE 50 UG/ML
25 INJECTION INTRAVENOUS ONCE
Qty: 0 | Refills: 0 | Status: DISCONTINUED | OUTPATIENT
Start: 2017-09-16 | End: 2017-09-16

## 2017-09-16 RX ORDER — FENTANYL CITRATE 50 UG/ML
100 INJECTION INTRAVENOUS ONCE
Qty: 0 | Refills: 0 | Status: DISCONTINUED | OUTPATIENT
Start: 2017-09-16 | End: 2017-09-16

## 2017-09-16 RX ADMIN — FENTANYL CITRATE 100 MICROGRAM(S): 50 INJECTION INTRAVENOUS at 14:45

## 2017-09-16 RX ADMIN — PANTOPRAZOLE SODIUM 10 MG/HR: 20 TABLET, DELAYED RELEASE ORAL at 05:29

## 2017-09-16 RX ADMIN — FENTANYL CITRATE 25 MICROGRAM(S): 50 INJECTION INTRAVENOUS at 15:15

## 2017-09-16 RX ADMIN — Medication 400 MILLIGRAM(S): at 22:10

## 2017-09-16 RX ADMIN — Medication 3 MILLILITER(S): at 16:29

## 2017-09-16 RX ADMIN — FENTANYL CITRATE 100 MICROGRAM(S): 50 INJECTION INTRAVENOUS at 14:30

## 2017-09-16 RX ADMIN — MIDAZOLAM HYDROCHLORIDE 2 MILLIGRAM(S): 1 INJECTION, SOLUTION INTRAMUSCULAR; INTRAVENOUS at 15:00

## 2017-09-16 RX ADMIN — SODIUM CHLORIDE 100 MILLILITER(S): 9 INJECTION, SOLUTION INTRAVENOUS at 05:29

## 2017-09-16 RX ADMIN — MIDAZOLAM HYDROCHLORIDE 4 MILLIGRAM(S): 1 INJECTION, SOLUTION INTRAMUSCULAR; INTRAVENOUS at 14:30

## 2017-09-16 RX ADMIN — Medication 1000 MILLIGRAM(S): at 22:40

## 2017-09-16 RX ADMIN — FENTANYL CITRATE 25 MICROGRAM(S): 50 INJECTION INTRAVENOUS at 15:00

## 2017-09-16 RX ADMIN — Medication 0.5 MILLIGRAM(S): at 23:23

## 2017-09-16 RX ADMIN — Medication 3 MILLILITER(S): at 05:05

## 2017-09-16 RX ADMIN — LIDOCAINE 1 PATCH: 4 CREAM TOPICAL at 12:10

## 2017-09-16 RX ADMIN — Medication 3 MILLILITER(S): at 22:45

## 2017-09-16 NOTE — PROGRESS NOTE ADULT - SUBJECTIVE AND OBJECTIVE BOX
TRAUMA SURGERY PROGRESS NOTE:     Overnight Events:  Nurse overnight denies bloody bowel movements. Family did not wish to bowel prep patient for possible colonoscopy. H/H in morning down trended and was transfused one unit of PRBC's.     Physical Exam  Vital Signs Last 24 Hrs  T(C): 37.2 (16 Sep 2017 11:25), Max: 37.6 (15 Sep 2017 23:00)  T(F): 98.9 (16 Sep 2017 11:25), Max: 99.7 (15 Sep 2017 23:00)  HR: 64 (16 Sep 2017 13:00) (53 - 91)  BP: 130/60 (15 Sep 2017 23:15) (88/54 - 130/60)  BP(mean): 87 (15 Sep 2017 23:15) (64 - 87)  RR: 22 (16 Sep 2017 13:00) (15 - 46)  SpO2: 100% (16 Sep 2017 13:00) (88% - 100%)    Gen: NAD  Abd: Soft, ND, NT  Ext: warm    I&O's Detail    15 Sep 2017 07:01  -  16 Sep 2017 07:00  --------------------------------------------------------  IN:    lactated ringers.: 1400 mL    Oral Fluid: 120 mL    Packed Red Blood Cells: 350 mL    pantoprazole Infusion: 150 mL  Total IN: 2020 mL    OUT:  Total OUT: 0 mL    Total NET: 2020 mL      16 Sep 2017 07:01  -  16 Sep 2017 13:30  --------------------------------------------------------  IN:    dextrose 5% + sodium chloride 0.45%.: 100 mL    lactated ringers.: 400 mL    pantoprazole Infusion: 40 mL  Total IN: 540 mL    OUT:  Total OUT: 0 mL    Total NET: 540 mL          Labs:                        7.8    13.2  )-----------( 233      ( 16 Sep 2017 13:23 )             22.7     09-16    150<H>  |  115<H>  |  54<H>  ----------------------------<  118<H>  4.0   |  23  |  1.14    Ca    8.5      16 Sep 2017 04:43  Phos  2.9       Mg     2.2     -      PT/INR - ( 16 Sep 2017 04:43 )   PT: 14.3 sec;   INR: 1.32 ratio         PTT - ( 16 Sep 2017 04:43 )  PTT:27.5 sec  Urinalysis Basic - ( 15 Sep 2017 03:23 )    Color: Yellow / Appearance: Clear / S.029 / pH: x  Gluc: x / Ketone: Small  / Bili: Negative / Urobili: Negative mg/dL   Blood: x / Protein: Trace mg/dL / Nitrite: Negative   Leuk Esterase: Small / RBC: 28 /HPF / WBC 4 /HPF   Sq Epi: x / Non Sq Epi: 3 /HPF / Bacteria: Negative      ABG - ( 16 Sep 2017 04:38 )  pH: 7.43  /  pCO2: 38    /  pO2: 98    / HCO3: 25    / Base Excess: 1.1   /  SaO2: 99

## 2017-09-16 NOTE — PROGRESS NOTE ADULT - SUBJECTIVE AND OBJECTIVE BOX
Events noted. Pt. now in SICU for GI bleed. He is agitated. To undergo endoscopy now.       Vital Signs Last 24 Hrs  T(C): 37.2 (16 Sep 2017 11:25), Max: 37.6 (15 Sep 2017 23:00)  T(F): 98.9 (16 Sep 2017 11:25), Max: 99.7 (15 Sep 2017 23:00)  HR: 61 (16 Sep 2017 14:00) (53 - 91)  BP: 130/60 (15 Sep 2017 23:15) (88/54 - 130/60)  BP(mean): 87 (15 Sep 2017 23:15) (64 - 87)  RR: 21 (16 Sep 2017 14:00) (15 - 46)  SpO2: 100% (16 Sep 2017 14:00) (88% - 100%)                          7.8    13.2  )-----------( 233      ( 16 Sep 2017 13:23 )             22.7       09-16    150<H>  |  115<H>  |  54<H>  ----------------------------<  118<H>  4.0   |  23  |  1.14    Ca    8.5      16 Sep 2017 04:43  Phos  2.9     09-16  Mg     2.2     09-16                MEDICATIONS  (STANDING):  citalopram 10 milliGRAM(s) Oral daily  donepezil 10 milliGRAM(s) Oral at bedtime  latanoprost 0.005% Ophthalmic Solution 1 Drop(s) Both EYES at bedtime  levothyroxine 50 MICROGram(s) Oral daily  lidocaine   Patch 1 Patch Transdermal every 24 hours  tamsulosin 0.4 milliGRAM(s) Oral at bedtime  ALBUTerol/ipratropium for Nebulization 3 milliLiter(s) Nebulizer three times a day  thiamine 100 milliGRAM(s) Oral daily  pantoprazole Infusion 8 mG/Hr (10 mL/Hr) IV Continuous <Continuous>  dextrose 5% + sodium chloride 0.45%. 1000 milliLiter(s) (100 mL/Hr) IV Continuous <Continuous>  fentaNYL    Injectable 100 MICROGram(s) IV Push once  midazolam Injectable 4 milliGRAM(s) IV Push once    MEDICATIONS  (PRN):  hydrALAZINE Injectable 10 milliGRAM(s) IV Push every 4 hours PRN Systolic greater than 180  acetaminophen   Tablet. 325 milliGRAM(s) Oral every 4 hours PRN Mild Pain (1 - 3)  QUEtiapine 12.5 milliGRAM(s) Oral every 8 hours PRN agitation      Elderly WM in bed, agitated, alert and oriented x 0 .  Insight and judgment are poor. Speech is minimal. No hallucinations nor delusions.  Attention and concentration poor with fluctuating levels of alertness.

## 2017-09-16 NOTE — PROGRESS NOTE ADULT - ASSESSMENT
87 yo male s/p fall with GI bleed  -GI consulted for upper and lower endoscopy. Will determine if possible to scope lower even with out bowel prep.   -Continue to trend H/H and protonix gtt.  -Appreciate SICU care

## 2017-09-16 NOTE — PROGRESS NOTE ADULT - SUBJECTIVE AND OBJECTIVE BOX
Follow-up Pulm Progress Note  Stefan Vides MD  [phone]    Events Noted: tx to sicu for acute decrease in Hb to 6.1 due to GIB  Stable lpost transfusion  Breathing comfortably supine  Afebrile and hemodynamically stable  CXR: [facility]    Vital Signs Last 24 Hrs  T(C): 37.2 (16 Sep 2017 07:00), Max: 37.6 (15 Sep 2017 23:00)  T(F): 99 (16 Sep 2017 07:00), Max: 99.7 (15 Sep 2017 23:00)  HR: 68 (16 Sep 2017 09:00) (55 - 91)  BP: 130/60 (15 Sep 2017 23:15) (88/54 - 130/60)  BP(mean): 87 (15 Sep 2017 23:15) (64 - 87)  RR: 25 (16 Sep 2017 09:00) (15 - 46)  SpO2: 100% (16 Sep 2017 09:00) (88% - 100%)    ABG - ( 16 Sep 2017 04:38 )  pH: 7.43  /  pCO2: 38    /  pO2: 98    / HCO3: 25    / Base Excess: 1.1   /  SaO2: 99                              7.9    12.9  )-----------( 242      ( 16 Sep 2017 09:06 )             23.4       09-16    150<H>  |  115<H>  |  54<H>  ----------------------------<  118<H>  4.0   |  23  |  1.14    Ca    8.5      16 Sep 2017 04:43  Phos  2.9     09-16  Mg     2.2     09-16      Physical Examination:  PULM: Clear without wheeze or rhonchi  CVS: Regular rate and rhythm, no murmurs, rubs, or gallops  ABD: Soft, non-tender  EXT:  No clubbing, cyanosis, or edema    RADIOLOGY REVIEWED  CXR: [facility]    CT chest:    TTE:

## 2017-09-16 NOTE — CONSULT NOTE ADULT - ATTENDING COMMENTS
I examined this patient with the SICU resident and PA.  All relevant studies reviewed and I agree with the resident documentation above.  Elderly man with significant dementia with agitation and combativeness, s/p "fall in bathroom" with right sided rib fractures (at least 4 contiguous) with pneumothorax.  Hypoxemic on initial evaluation.  PPM in place.  Known coronary artery and carotid disease.  S/p several surgical procedures (see above).  Moderately hypoxic in unit.  Will need pulse oximeter trending, good pain control, pulmonary toilet, and repeat CXR to assess for evolution of a pulmonary contusion.
I examined this patient with the SICU resident in the afternoon of 5 September 2017.  All relevant studies reviewed.   Discussed with Dr. Epstein of trauma.  Agree with data above.  Issues:  Demetia with behavioiral disturbance - anxiety and combativeness.  Continue medications  Acute respiratory failure s/p trauma  Pneumothorax - s/p pigtail catheter placement.    ICU admission, pain control, reassurance.
please see updated note above
Pt seen and examined.  Urine crystal clear on CBI rapid at this time.  appears comfortable.  brunner draining.  slow CBI, will observe.  plan to await OOB and eventual d/c of brunner catheter when not needed

## 2017-09-16 NOTE — PROGRESS NOTE ADULT - SUBJECTIVE AND OBJECTIVE BOX
History:  88M PMHx of CAD s/p stent and PPM, and dementia who presented on 9/5/2017 s/p mechanical fall. Patient was initially admitted to the SICU for respiratory monitoring as the patient had sustained right 4th-8th rib fractures and a large right pneumothorax s/p pigtail catheter placement. He was stabilized from a respiratory standpoint and the right pigtail catheter was d/c'ed with no evidence of pneumothorax afterwards. He was then transferred to the floors on 9/8/2017, after which CBC was not checked and patient was hemodynamically stable. On 9/15/17 repeat CBC noted w/ significant drop in h/h (7.2/21.8 from 11.6/33.9). Later in day patient was found minimally responsive and had a very large bowel movement with blood clots, hypotensive with a systolic BP in the 80s. labs notable for further drop in h/h (6.1/18.0). BP responsive to IVF bolus, w/ improved mental status. Patient transferred to SICU, A-line placed. NGT lavage performed w/out evidence of blood from NGT. 2U transfused w/ appropriate response. GI was consulted and recommended making the patient NPO, starting a Protonix gtt, and administered bowel prep for an endoscopy & colonoscopy.    24hr Events:  O/n family refused bowel prep as requested by GI. Serial h/h gradually trending down s/p 2U. NGT removed per request of family. History:  88M PMHx of CAD s/p stent and PPM, and dementia who presented on 9/5/2017 s/p mechanical fall. Patient was initially admitted to the SICU for respiratory monitoring as the patient had sustained right 4th-8th rib fractures and a large right pneumothorax s/p pigtail catheter placement. He was stabilized from a respiratory standpoint and the right pigtail catheter was d/c'ed with no evidence of pneumothorax afterwards. He was then transferred to the floors on 9/8/2017, after which CBC was not checked and patient was hemodynamically stable. On 9/15/17 repeat CBC noted w/ significant drop in h/h (7.2/21.8 from 11.6/33.9). Later in day patient was found minimally responsive and had a very large bowel movement with blood clots, hypotensive with a systolic BP in the 80s. labs notable for further drop in h/h (6.1/18.0). BP responsive to IVF bolus, w/ improved mental status. Patient transferred to SICU, A-line placed. NGT lavage performed w/out evidence of blood from NGT. 2U transfused w/ appropriate response. GI was consulted and recommended making the patient NPO, starting a Protonix gtt, and administered bowel prep for an endoscopy & colonoscopy.    24hr Events:  O/n family refused bowel prep as requested by GI. Serial h/h gradually trending down s/p 2U. NGT removed per request of family.       SUBJECTIVE/ROS:  [ ] A ten-point review of systems was otherwise negative except as noted.  [x ] Due to altered mental status/intubation, subjective information were not able to be obtained from the patient. History was obtained, to the extent possible, from review of the chart and collateral sources of information.      NEURO  RASS:  +1  Exam: disoriented, awake, agitated  Meds: citalopram 10 milliGRAM(s) Oral daily  donepezil 10 milliGRAM(s) Oral at bedtime  acetaminophen   Tablet. 325 milliGRAM(s) Oral every 4 hours PRN Mild Pain (1 - 3)  QUEtiapine 12.5 milliGRAM(s) Oral every 8 hours PRN agitation    [x] Adequacy of sedation and pain control has been assessed and adjusted      RESPIRATORY  RR: 20 (09-16-17 @ 07:00) (16 - 46)  SpO2: 97% (09-16-17 @ 07:00) (94% - 100%)  Wt(kg): --  Exam: unlabored  Mechanical Ventilation: n/a  ABG - ( 16 Sep 2017 04:38 )  pH: 7.43  /  pCO2: 38    /  pO2: 98    / HCO3: 25    / Base Excess: 1.1   /  SaO2: 99      Lactate: x            [n/a ] Extubation Readiness Assessed  Meds: ALBUTerol/ipratropium for Nebulization 3 milliLiter(s) Nebulizer three times a day        CARDIOVASCULAR  HR: 69 (09-16-17 @ 07:00) (68 - 91)  BP: 130/60 (09-15-17 @ 23:15) (88/54 - 130/67)  BP(mean): 87 (09-15-17 @ 23:15) (64 - 87)  ABP: 133/38 (09-16-17 @ 07:00) (98/49 - 162/54)  ABP(mean): 66 (09-16-17 @ 07:00) (64 - 84)  Wt(kg): --  CVP(cm H2O): --  VBG - ( 15 Sep 2017 16:37 )  pH: 7.51  /  pCO2: 24    /  pO2: 250   / HCO3: 19    / Base Excess: -3.3  /  SaO2: 100    Lactate: 6.3        Exam: regular, bradycardic  Cardiac Rhythm: sinus  Perfusion     [x ]Adequate   [ ]Inadequate  Mentation   [ ]Normal       [x ]Reduced  Extremities  [x ]Warm         [ ]Cool  Volume Status [ ]Hypervolemic [ ]Euvolemic [ ]Hypovolemic  Meds: hydrALAZINE Injectable 10 milliGRAM(s) IV Push every 4 hours PRN Systolic greater than 180  tamsulosin 0.4 milliGRAM(s) Oral at bedtime        GI/NUTRITION  Exam: soft, NT/ND  Diet: NPO  Meds: pantoprazole Infusion 8 mG/Hr IV Continuous <Continuous>      GENITOURINARY  I&O's Detail    09-15 @ 07:01  -  09-16 @ 07:00  --------------------------------------------------------  IN:    lactated ringers.: 1400 mL    Oral Fluid: 120 mL    Packed Red Blood Cells: 350 mL    pantoprazole Infusion: 150 mL  Total IN: 2020 mL    OUT:  Total OUT: 0 mL    Total NET: 2020 mL          09-16    150<H>  |  115<H>  |  54<H>  ----------------------------<  118<H>  4.0   |  23  |  1.14    Ca    8.5      16 Sep 2017 04:43  Phos  2.9     09-16  Mg     2.2     09-16      [ n/a] Read catheter, indication: N/A  Meds: thiamine 100 milliGRAM(s) Oral daily  lactated ringers. 1000 milliLiter(s) IV Continuous <Continuous>        HEMATOLOGIC  Meds: holding DVTppx given bleed  [x] VTE Prophylaxis                        7.3    13.2  )-----------( 227      ( 16 Sep 2017 04:43 )             20.7     PT/INR - ( 16 Sep 2017 04:43 )   PT: 14.3 sec;   INR: 1.32 ratio         PTT - ( 16 Sep 2017 04:43 )  PTT:27.5 sec  Transfusion     [3 ] PRBC   [ ] Platelets   [ ] FFP   [ ] Cryoprecipitate      INFECTIOUS DISEASES  T(C): 36.9 (09-16-17 @ 03:00), Max: 37.6 (09-15-17 @ 23:00)  Wt(kg): --  WBC Count: 13.2 K/uL (09-16 @ 04:43)  WBC Count: 15.1 K/uL (09-15 @ 23:56)  WBC Count: 14.7 K/uL (09-15 @ 20:38)  WBC Count: 15.5 K/uL (09-15 @ 18:33)  WBC Count: 20.3 K/uL (09-15 @ 16:42)  WBC Count: 15.9 K/uL (09-15 @ 15:56)  WBC Count: 14.2 K/uL (09-15 @ 11:01)  WBC Count: 10.94 K/uL (09-15 @ 09:13)    Recent Cultures:   9/14 UCx: NGTD    Meds: cefTRIAXone   IVPB      cefTRIAXone   IVPB 1 Gram(s) IV Intermittent every 24 hours        ENDOCRINE  Capillary Blood Glucose  133 (15 Sep 2017 16:25)    Meds: levothyroxine 50 MICROGram(s) Oral daily        ACCESS DEVICES:  [x ] Peripheral IV  [ ] Central Venous Line	[ ] R	[ ] L	[ ] IJ	[ ] Fem	[ ] SC	Placed:   [x ] Arterial Line		[x] R	[ ] L	[ ] Fem	[x ] Rad	[ ] Ax	Placed: 9/15  [ ] PICC:					[ ] Mediport  [ ] Urinary Catheter, Date Placed:   [x ] Necessity of urinary, arterial, and venous catheters discussed    OTHER MEDICATIONS:  latanoprost 0.005% Ophthalmic Solution 1 Drop(s) Both EYES at bedtime  lidocaine   Patch 1 Patch Transdermal every 24 hours      CODE STATUS: FULL    IMAGING: none

## 2017-09-16 NOTE — PROGRESS NOTE ADULT - ATTENDING COMMENTS
Evaluated in AM round  Possible UGI bleed, PPI drip IVF serial H/H prn transfusion  Awaiting GI endoscopy  Doubt UTI, Dc abx

## 2017-09-16 NOTE — PROGRESS NOTE ADULT - ASSESSMENT
GIB with anemia - hemodynamically stable post Tx RBC  Postraumatic (fall) multiple R rib fractures and large R lpneumothorax   Respansion R lung post R chest tube insertion  Dementia with agitation and lethargy:  REC    Monitor CBC and blood pressure per SICU team  Nasal oxygen for sat >= 90%  Mobilize with PT as tolerated  DVT prophylaxis

## 2017-09-16 NOTE — CONSULT NOTE ADULT - ASSESSMENT
Impression: 87yo M with history of CAD and recent mechanical fall with rib fx and subsequent PNA who developed acute symptomatic GI bleed.    Problem List:  1) Melena, ddx includes PUD, h pylori, anigoectasias: from stomach, duodenum, small intestine, right colon; MWT  2) Acute blood loss anemia, 2/2 above  3) Delirium on Dementia  4) h/o CAD, not on antiplatelet therapy    Plan:  - please see endoscopy report in [facility] for details; found 4 large duodenal ulcers, some with bleeding stigmata, treated with clip  - continue NPO status today, can advance to clears tomorrow   - continue PPI ggt x 72 hours, then transition to oral PPI BID  - followup pathology report  - continue to monitor CBC and transfuse Hgb if < 7 Impression: 87yo M with history of CAD and recent mechanical fall with rib fx and subsequent PNA who developed acute symptomatic GI bleed.    Problem List:  1) Melena, ddx includes PUD, h pylori, anigoectasias: from stomach, duodenum, small intestine, right colon; MWT  2) Acute blood loss anemia, 2/2 above  3) Delirium on Dementia  4) h/o CAD, not on antiplatelet therapy    Plan:  - please see endoscopy report in [facility] for details; found 4 large duodenal ulcers, one with a non-bleeding visible vessel that was treated with a clip.   - continue NPO status today, can advance to clears tomorrow   - continue PPI ggt x 72 hours, then transition to oral PPI once daily  - followup pathology report  - continue to monitor CBC and transfuse Hgb if < 7  - if shows signs of recurrent bleeding (drop of blood pressure, Hb drop), will repeat EGD

## 2017-09-16 NOTE — PROGRESS NOTE ADULT - ASSESSMENT
Delirium secondary to GI bleed, UTI    Recommend  Check QTc and if under 500ms can give Seroquel 12.5mg p.o. q8h prn agitation or if he cannot take pills, Haldol 1mg IV q8h prn severe agitation.      Stefan Santillan M.D.  Psychiatry  [phone]

## 2017-09-16 NOTE — PROGRESS NOTE ADULT - ASSESSMENT
88M s/p fall with right 4th-8th rib fractures and resolved right pneumothorax now presenting with an acute GI bleed, readmitted to SICU for monitoring, serial h/h & possible endoscopy by GI.    Neuro:  - IV acetaminophen as needed for pain, avoid narcotics  - Monitor neuro status every 4 hours.  -seroquel PRN  - citalopram, donepezil,     Resp:  - Monitor pulse oximeter.  - Oxygen as needed via nasal cannula for goal SpO2 greater than 92%.  - duonebs    CV:  - Monitor vital signs.  - Will hold all home anti-hypertensives.  - hydralazine PRN for BP control    GI:  - Strict NPO.  - hold off on bowel prep as family refusing  - possible upper endoscopy today  - Protonix gtt.    Renal:  - Monitor I&Os.  - Monitor electrolytes and replete as necessary.  - LR at 100 mL/hr as patient will be NPO.  - flomax    Heme:  - SCDs for mechanical VTE prophylaxis as patient is actively bleeding.  - Trend CBC as per hemorrhage protocol (q1hr for 3 hours, then q4hrs if trending down or bleeding is no longer suspected).  - Trend coags.    ID:  - Continue ceftriaxone for UTI.  - Monitor for clinical evidence of active infection.    Endo:  - Monitor blood glucose on BMP.  - levothyroxine      Please contact SICU resident [phone] with questions/concerns.

## 2017-09-17 LAB
ANION GAP SERPL CALC-SCNC: 8 MMOL/L — SIGNIFICANT CHANGE UP (ref 5–17)
APTT BLD: 24.9 SEC — LOW (ref 27.5–37.4)
BUN SERPL-MCNC: 35 MG/DL — HIGH (ref 7–23)
CA-I BLD-SCNC: 1.27 MMOL/L — SIGNIFICANT CHANGE UP (ref 1.12–1.3)
CALCIUM SERPL-MCNC: 8.3 MG/DL — LOW (ref 8.4–10.5)
CHLORIDE SERPL-SCNC: 116 MMOL/L — HIGH (ref 96–108)
CO2 SERPL-SCNC: 24 MMOL/L — SIGNIFICANT CHANGE UP (ref 22–31)
CREAT SERPL-MCNC: 0.98 MG/DL — SIGNIFICANT CHANGE UP (ref 0.5–1.3)
GAS PNL BLDA: SIGNIFICANT CHANGE UP
GAS PNL BLDA: SIGNIFICANT CHANGE UP
GLUCOSE SERPL-MCNC: 125 MG/DL — HIGH (ref 70–99)
HCT VFR BLD CALC: 22.1 % — LOW (ref 39–50)
HCT VFR BLD CALC: 25.8 % — LOW (ref 39–50)
HCT VFR BLD CALC: 28.1 % — LOW (ref 39–50)
HGB BLD-MCNC: 7.8 G/DL — LOW (ref 13–17)
HGB BLD-MCNC: 8.8 G/DL — LOW (ref 13–17)
HGB BLD-MCNC: 9.8 G/DL — LOW (ref 13–17)
INR BLD: 1.26 RATIO — HIGH (ref 0.88–1.16)
MAGNESIUM SERPL-MCNC: 1.9 MG/DL — SIGNIFICANT CHANGE UP (ref 1.6–2.6)
MAGNESIUM SERPL-MCNC: 2.1 MG/DL — SIGNIFICANT CHANGE UP (ref 1.6–2.6)
MCHC RBC-ENTMCNC: 32.2 PG — SIGNIFICANT CHANGE UP (ref 27–34)
MCHC RBC-ENTMCNC: 32.7 PG — SIGNIFICANT CHANGE UP (ref 27–34)
MCHC RBC-ENTMCNC: 32.7 PG — SIGNIFICANT CHANGE UP (ref 27–34)
MCHC RBC-ENTMCNC: 34.3 GM/DL — SIGNIFICANT CHANGE UP (ref 32–36)
MCHC RBC-ENTMCNC: 35 GM/DL — SIGNIFICANT CHANGE UP (ref 32–36)
MCHC RBC-ENTMCNC: 35.2 GM/DL — SIGNIFICANT CHANGE UP (ref 32–36)
MCV RBC AUTO: 93.1 FL — SIGNIFICANT CHANGE UP (ref 80–100)
MCV RBC AUTO: 93.4 FL — SIGNIFICANT CHANGE UP (ref 80–100)
MCV RBC AUTO: 93.8 FL — SIGNIFICANT CHANGE UP (ref 80–100)
PHOSPHATE SERPL-MCNC: 2.1 MG/DL — LOW (ref 2.5–4.5)
PHOSPHATE SERPL-MCNC: 5.5 MG/DL — HIGH (ref 2.5–4.5)
PLATELET # BLD AUTO: 209 K/UL — SIGNIFICANT CHANGE UP (ref 150–400)
PLATELET # BLD AUTO: 215 K/UL — SIGNIFICANT CHANGE UP (ref 150–400)
PLATELET # BLD AUTO: 217 K/UL — SIGNIFICANT CHANGE UP (ref 150–400)
POTASSIUM SERPL-MCNC: 3.5 MMOL/L — SIGNIFICANT CHANGE UP (ref 3.5–5.3)
POTASSIUM SERPL-SCNC: 3.5 MMOL/L — SIGNIFICANT CHANGE UP (ref 3.5–5.3)
PROTHROM AB SERPL-ACNC: 13.7 SEC — HIGH (ref 9.8–12.7)
RBC # BLD: 2.37 M/UL — LOW (ref 4.2–5.8)
RBC # BLD: 2.75 M/UL — LOW (ref 4.2–5.8)
RBC # BLD: 3.01 M/UL — LOW (ref 4.2–5.8)
RBC # FLD: 14.9 % — HIGH (ref 10.3–14.5)
RBC # FLD: 14.9 % — HIGH (ref 10.3–14.5)
RBC # FLD: 15.1 % — HIGH (ref 10.3–14.5)
SODIUM SERPL-SCNC: 148 MMOL/L — HIGH (ref 135–145)
WBC # BLD: 11.5 K/UL — HIGH (ref 3.8–10.5)
WBC # BLD: 12 K/UL — HIGH (ref 3.8–10.5)
WBC # BLD: 13.7 K/UL — HIGH (ref 3.8–10.5)
WBC # FLD AUTO: 11.5 K/UL — HIGH (ref 3.8–10.5)
WBC # FLD AUTO: 12 K/UL — HIGH (ref 3.8–10.5)
WBC # FLD AUTO: 13.7 K/UL — HIGH (ref 3.8–10.5)

## 2017-09-17 PROCEDURE — 99232 SBSQ HOSP IP/OBS MODERATE 35: CPT | Mod: GC

## 2017-09-17 PROCEDURE — 71010: CPT | Mod: 26

## 2017-09-17 PROCEDURE — 99291 CRITICAL CARE FIRST HOUR: CPT

## 2017-09-17 RX ORDER — IPRATROPIUM/ALBUTEROL SULFATE 18-103MCG
3 AEROSOL WITH ADAPTER (GRAM) INHALATION ONCE
Qty: 0 | Refills: 0 | Status: COMPLETED | OUTPATIENT
Start: 2017-09-17 | End: 2017-09-17

## 2017-09-17 RX ORDER — POTASSIUM CHLORIDE 20 MEQ
10 PACKET (EA) ORAL
Qty: 0 | Refills: 0 | Status: COMPLETED | OUTPATIENT
Start: 2017-09-17 | End: 2017-09-17

## 2017-09-17 RX ORDER — MAGNESIUM SULFATE 500 MG/ML
2 VIAL (ML) INJECTION ONCE
Qty: 0 | Refills: 0 | Status: COMPLETED | OUTPATIENT
Start: 2017-09-17 | End: 2017-09-17

## 2017-09-17 RX ORDER — POTASSIUM PHOSPHATE, MONOBASIC POTASSIUM PHOSPHATE, DIBASIC 236; 224 MG/ML; MG/ML
15 INJECTION, SOLUTION INTRAVENOUS ONCE
Qty: 0 | Refills: 0 | Status: COMPLETED | OUTPATIENT
Start: 2017-09-17 | End: 2017-09-17

## 2017-09-17 RX ORDER — QUETIAPINE FUMARATE 200 MG/1
12.5 TABLET, FILM COATED ORAL ONCE
Qty: 0 | Refills: 0 | Status: COMPLETED | OUTPATIENT
Start: 2017-09-17 | End: 2017-09-17

## 2017-09-17 RX ORDER — QUETIAPINE FUMARATE 200 MG/1
12.5 TABLET, FILM COATED ORAL
Qty: 0 | Refills: 0 | Status: DISCONTINUED | OUTPATIENT
Start: 2017-09-17 | End: 2017-09-18

## 2017-09-17 RX ORDER — CITALOPRAM 10 MG/1
10 TABLET, FILM COATED ORAL AT BEDTIME
Qty: 0 | Refills: 0 | Status: DISCONTINUED | OUTPATIENT
Start: 2017-09-17 | End: 2017-09-20

## 2017-09-17 RX ORDER — MEMANTINE HYDROCHLORIDE 10 MG/1
10 TABLET ORAL EVERY 12 HOURS
Qty: 0 | Refills: 0 | Status: DISCONTINUED | OUTPATIENT
Start: 2017-09-17 | End: 2017-09-20

## 2017-09-17 RX ADMIN — DONEPEZIL HYDROCHLORIDE 10 MILLIGRAM(S): 10 TABLET, FILM COATED ORAL at 22:33

## 2017-09-17 RX ADMIN — LATANOPROST 1 DROP(S): 0.05 SOLUTION/ DROPS OPHTHALMIC; TOPICAL at 22:50

## 2017-09-17 RX ADMIN — SODIUM CHLORIDE 75 MILLILITER(S): 9 INJECTION, SOLUTION INTRAVENOUS at 21:41

## 2017-09-17 RX ADMIN — PANTOPRAZOLE SODIUM 10 MG/HR: 20 TABLET, DELAYED RELEASE ORAL at 16:43

## 2017-09-17 RX ADMIN — Medication 100 MILLIEQUIVALENT(S): at 07:21

## 2017-09-17 RX ADMIN — PANTOPRAZOLE SODIUM 10 MG/HR: 20 TABLET, DELAYED RELEASE ORAL at 22:50

## 2017-09-17 RX ADMIN — CITALOPRAM 10 MILLIGRAM(S): 10 TABLET, FILM COATED ORAL at 22:33

## 2017-09-17 RX ADMIN — LIDOCAINE 1 PATCH: 4 CREAM TOPICAL at 01:09

## 2017-09-17 RX ADMIN — Medication 100 MILLIEQUIVALENT(S): at 09:01

## 2017-09-17 RX ADMIN — Medication 325 MILLIGRAM(S): at 23:03

## 2017-09-17 RX ADMIN — TAMSULOSIN HYDROCHLORIDE 0.4 MILLIGRAM(S): 0.4 CAPSULE ORAL at 22:33

## 2017-09-17 RX ADMIN — SODIUM CHLORIDE 100 MILLILITER(S): 9 INJECTION, SOLUTION INTRAVENOUS at 06:22

## 2017-09-17 RX ADMIN — Medication 3 MILLILITER(S): at 05:10

## 2017-09-17 RX ADMIN — SODIUM CHLORIDE 75 MILLILITER(S): 9 INJECTION, SOLUTION INTRAVENOUS at 09:39

## 2017-09-17 RX ADMIN — POTASSIUM PHOSPHATE, MONOBASIC POTASSIUM PHOSPHATE, DIBASIC 62.5 MILLIMOLE(S): 236; 224 INJECTION, SOLUTION INTRAVENOUS at 06:22

## 2017-09-17 RX ADMIN — LIDOCAINE 1 PATCH: 4 CREAM TOPICAL at 12:10

## 2017-09-17 RX ADMIN — QUETIAPINE FUMARATE 12.5 MILLIGRAM(S): 200 TABLET, FILM COATED ORAL at 10:47

## 2017-09-17 RX ADMIN — Medication 50 GRAM(S): at 10:42

## 2017-09-17 RX ADMIN — Medication 3 MILLILITER(S): at 22:35

## 2017-09-17 RX ADMIN — Medication 100 MILLIGRAM(S): at 12:10

## 2017-09-17 RX ADMIN — PANTOPRAZOLE SODIUM 10 MG/HR: 20 TABLET, DELAYED RELEASE ORAL at 06:22

## 2017-09-17 RX ADMIN — Medication 3 MILLILITER(S): at 13:34

## 2017-09-17 RX ADMIN — LIDOCAINE 1 PATCH: 4 CREAM TOPICAL at 23:46

## 2017-09-17 RX ADMIN — Medication 325 MILLIGRAM(S): at 22:33

## 2017-09-17 RX ADMIN — Medication 100 MILLIEQUIVALENT(S): at 06:22

## 2017-09-17 NOTE — PROGRESS NOTE ADULT - SUBJECTIVE AND OBJECTIVE BOX
Chief Complaint:  Patient is a 88y old  Male who presents with a chief complaint of unwitnessed fall, multiple rib fractures with ptx on xray (08 Sep 2017 11:31)      Interval Events:   no further BMs overnight.  Hgb appears to have stablizied.  Pt not responding to questions this AM, intermittently agitated.     Allergies:  No Known Allergies      Hospital Medications:  donepezil 10 milliGRAM(s) Oral at bedtime  latanoprost 0.005% Ophthalmic Solution 1 Drop(s) Both EYES at bedtime  levothyroxine 50 MICROGram(s) Oral daily  lidocaine   Patch 1 Patch Transdermal every 24 hours  hydrALAZINE Injectable 10 milliGRAM(s) IV Push every 4 hours PRN  tamsulosin 0.4 milliGRAM(s) Oral at bedtime  acetaminophen   Tablet. 325 milliGRAM(s) Oral every 4 hours PRN  ALBUTerol/ipratropium for Nebulization 3 milliLiter(s) Nebulizer three times a day  thiamine 100 milliGRAM(s) Oral daily  pantoprazole Infusion 8 mG/Hr IV Continuous <Continuous>  dextrose 5% + sodium chloride 0.45%. 1000 milliLiter(s) IV Continuous <Continuous>  memantine 10 milliGRAM(s) Oral every 12 hours  QUEtiapine 12.5 milliGRAM(s) Oral <User Schedule>  citalopram 10 milliGRAM(s) Oral at bedtime      PMHX/PSHX:  HTN (hypertension)  Skin cancer  Parotid neoplasm  Carotid artery disease  Dementia  Glaucoma  Hypothyroid  CAD (coronary artery disease)  Artificial cardiac pacemaker  H/O carotid endarterectomy  S/P hernia repair  S/P knee replacement  H/O shoulder surgery  Stented coronary artery      Family history:  Family history of heart disease (Father)  No pertinent family history in first degree relatives      ROS: unable to obtain 2/2 patient mental status       PHYSICAL EXAM:     GENERAL:  NAD  HEENT:  NC/AT  CHEST:  Full & symmetric excursion  ABDOMEN:  Soft, non-tender, non-distended, normoactive bowel sounds  EXTREMITIES:  upper extrem in restraints   NEURO:  somnolent    Vital Signs:  Vital Signs Last 24 Hrs  T(C): 36.7 (17 Sep 2017 11:00), Max: 37.2 (16 Sep 2017 15:00)  T(F): 98 (17 Sep 2017 11:00), Max: 99 (16 Sep 2017 15:00)  HR: 65 (17 Sep 2017 11:00) (53 - 73)  BP: 149/67 (17 Sep 2017 11:00) (129/59 - 149/67)  BP(mean): 97 (17 Sep 2017 11:00) (85 - 100)  RR: 36 (17 Sep 2017 11:00) (15 - 45)  SpO2: 99% (17 Sep 2017 11:00) (95% - 100%)  Daily     Daily Weight in k.6 (17 Sep 2017 01:50)    LABS:                        8.8    12.0  )-----------( 217      ( 17 Sep 2017 09:02 )             25.8     09-17    148<H>  |  116<H>  |  35<H>  ----------------------------<  125<H>  3.5   |  24  |  0.98    Ca    8.3<L>      17 Sep 2017 03:50  Phos  5.5     09-17  Mg     1.9     -17        PT/INR - ( 17 Sep 2017 03:50 )   PT: 13.7 sec;   INR: 1.26 ratio         PTT - ( 17 Sep 2017 03:50 )  PTT:24.9 sec        Imaging:    < from: Upper Endoscopy (17 @ 14:18) >  Findings:       A small hiatus hernia was present.       Diffuse moderately congested mucosa was found at the pylorus. Biopsies were taken with a cold        forceps for Helicobacter pylori testing. Verification of patient identification for the        specimen was done by the physician and nurse using the patient's name and birth date.        Estimated blood loss was minimal.       One non-bleeding cratered duodenal ulcer with a flat pigmented spot (Ian Class IIc) was        found in the duodenal bulb. The lesion was 8 mm in largest dimension.       Two connected non-bleeding cratered duodenal ulcers with a clean ulcer base (Ian Class        III) were found in the first part of the duodenum. The largest lesion was 7 mm in largest        dimension.       One non-bleeding cratered duodenal ulcer with a nonbleeding visible vessel (Ian Class        IIa) was found in the first part of the duodenum. The lesion was 25 mm in largest dimension.        Prevent bleeding, one hemostatic clip was successfully placed (MR conditional). There was no        bleeding during, and at the end, of the procedure.       The 2nd part of the duodenum was normal.                                                                Impression:          - Small hiatus hernia.                       - Congestive gastropathy. Biopsied.                       - One non-bleeding duodenal ulcer with a flat pigmented spot (Ian Class                        IIc).                       - Multiple non-bleeding duodenal ulcers with a clean ulcer base (Ian                        Class III).                       - One non-bleeding duodenal ulcer with a nonbleeding visible vessel (Ian                        Class IIa). Clip (MR conditional) was placed.                       - Normal 2nd part of the duodenum.  Recommendation:      - Return patient to ICU for ongoing care.                       - NPO today.                - Use a proton pump inhibitor IV for 3 days.                       - Await pathology results.                                                                                < end of copied text >

## 2017-09-17 NOTE — PROGRESS NOTE ADULT - ASSESSMENT
87 y/o male s/p fall with right 4th-8th rib fractures and resolved right pneumothorax now presenting with an acute GI bleed.    Neuro:  - IV acetaminophen as needed for pain.  - Monitor neuro status every 4 hours.    Resp:  - Monitor pulse oximeter.  - Oxygen as needed via nasal cannula for goal SpO2 greater than 92%.    CV:  - Monitor vital signs.  - Will hold all home anti-hypertensives.    GI:  -s/p EGD with GI  -bedside speech and swallow this am   - Protonix gtt.    Renal:  - Monitor I&Os.  - Monitor electrolytes and replete as necessary.  - D5 .45%NS since currently NPO    Heme:  - SCDs for mechanical VTE prophylaxis as patient is actively bleeding.  - Trend CBC, slow drop this am, transfusion likely   - Trend coags.    ID:  - Monitor for clinical evidence of active infection.    Endo:  - Monitor blood glucose on BMP.

## 2017-09-17 NOTE — PROGRESS NOTE ADULT - SUBJECTIVE AND OBJECTIVE BOX
Patient is a 88y old  Male who presents with a chief complaint of unwitnessed fall, multiple rib fractures with ptx on xray (08 Sep 2017 11:31)      SUBJECTIVE / OVERNIGHT EVENTS:    lethargic and confused  no specific complaints  remains in SICU    Vital Signs Last 24 Hrs  T(C): 37 (17 Sep 2017 03:00), Max: 37.2 (16 Sep 2017 11:25)  T(F): 98.6 (17 Sep 2017 03:00), Max: 99 (16 Sep 2017 15:00)  HR: 64 (17 Sep 2017 08:00) (53 - 73)  BP: 129/59 (17 Sep 2017 00:00) (129/59 - 129/59)  BP(mean): 85 (17 Sep 2017 00:00) (85 - 85)  RR: 28 (17 Sep 2017 08:00) (15 - 45)  SpO2: 100% (17 Sep 2017 08:00) (95% - 100%)  I&O's Summary    16 Sep 2017 07:01  -  17 Sep 2017 07:00  --------------------------------------------------------  IN: 2950 mL / OUT: 0 mL / NET: 2950 mL        GENERAL: NAD, AAOx1, lethargic, chronic sick appearing  HEAD:  Atraumatic, Normocephalic  EYES: EOMI, PERRLA, conjunctiva and sclera clear  NECK: Supple, No JVD, No LAD  CHEST/LUNG: Clear to auscultation bilaterally; No wheeze  HEART: Regular rate and rhythm; No murmurs, rubs, or gallops  ABDOMEN: Soft, Nontender, Nondistended; Bowel sounds present  EXTREMITIES:  2+ Peripheral Pulses, No clubbing, cyanosis, or edema  SKIN: No rashes or lesions      LABS:                        7.8    11.5  )-----------( 209      ( 17 Sep 2017 03:50 )             22.1     09-17    148<H>  |  116<H>  |  35<H>  ----------------------------<  125<H>  3.5   |  24  |  0.98    Ca    8.3<L>      17 Sep 2017 03:50  Phos  2.1     09-17  Mg     2.1     09-17      PT/INR - ( 17 Sep 2017 03:50 )   PT: 13.7 sec;   INR: 1.26 ratio         PTT - ( 17 Sep 2017 03:50 )  PTT:24.9 sec  CAPILLARY BLOOD GLUCOSE                RADIOLOGY & ADDITIONAL TESTS:    Imaging Personally Reviewed:  [x] YES  [ ] NO    Consultant(s) Notes Reviewed:  [x] YES  [ ] NO      MEDICATIONS  (STANDING):  citalopram 10 milliGRAM(s) Oral daily  donepezil 10 milliGRAM(s) Oral at bedtime  latanoprost 0.005% Ophthalmic Solution 1 Drop(s) Both EYES at bedtime  levothyroxine 50 MICROGram(s) Oral daily  lidocaine   Patch 1 Patch Transdermal every 24 hours  tamsulosin 0.4 milliGRAM(s) Oral at bedtime  ALBUTerol/ipratropium for Nebulization 3 milliLiter(s) Nebulizer three times a day  thiamine 100 milliGRAM(s) Oral daily  pantoprazole Infusion 8 mG/Hr (10 mL/Hr) IV Continuous <Continuous>  dextrose 5% + sodium chloride 0.45%. 1000 milliLiter(s) (100 mL/Hr) IV Continuous <Continuous>  potassium chloride  10 mEq/100 mL IVPB 10 milliEquivalent(s) IV Intermittent every 1 hour    MEDICATIONS  (PRN):  hydrALAZINE Injectable 10 milliGRAM(s) IV Push every 4 hours PRN Systolic greater than 180  acetaminophen   Tablet. 325 milliGRAM(s) Oral every 4 hours PRN Mild Pain (1 - 3)  QUEtiapine 12.5 milliGRAM(s) Oral every 8 hours PRN agitation      Care Discussed with Consultants/Other Providers [x] YES  [ ] NO    HEALTH ISSUES - PROBLEM Dx:  Hematuria: Hematuria

## 2017-09-17 NOTE — PROGRESS NOTE ADULT - ATTENDING COMMENTS
Evaluated in AM round  UGI bleed seems to be resolving, HCT stable  Rx of delirium, start his dementia meds  PPI drip  Dysphasia diet

## 2017-09-17 NOTE — PROGRESS NOTE ADULT - ASSESSMENT
Impression: 87yo M with history of CAD and recent mechanical fall with rib fx and subsequent PNA who developed acute symptomatic GI bleed, found to have large DUs.     Problem List:  1) multiple DUs, one with flat pigmented spot and one with visible non bleeding vessel, s/p clip  2) Acute blood loss anemia, 2/2 above  3) Delirium on Dementia  4) h/o CAD, not on antiplatelet therapy    Plan:  - no signs of further bleeding, thought patient might pass some more melena from digested blood   - can attempt to advance diet to clears if mental status allows  - continue PPI ggt x 72 hours, then transition to oral PPI once daily  - followup pathology report  - continue to monitor CBC and transfuse Hgb if < 7  - if shows signs of recurrent bleeding (drop of blood pressure, Hb drop), will repeat EGD

## 2017-09-17 NOTE — PROGRESS NOTE ADULT - ATTENDING COMMENTS
Pt. s/p EGD yesterday with extensive duodenal ulcerations, 1 visible vessel clipped.  Hemodynamically stable, HR ~75, sBP > 130.   Hb 9.8, with rise from 7.8 yesterday before 2U PRBCs were given (1yesterday afternoon, 1 today), i.e. appropriate rise.  No signs of ongoing GI bleed.  - monitor VS and Hb  - PPI gtt for 72 hrs, then PO BID

## 2017-09-17 NOTE — PROGRESS NOTE ADULT - SUBJECTIVE AND OBJECTIVE BOX
[facility] Surgery Progress Note     Subjective/24hour Events:    Vital Signs:  ICU Vital Signs Last 24 Hrs  T(C): 37 (17 Sep 2017 15:00), Max: 37 (17 Sep 2017 03:00)  T(F): 98.6 (17 Sep 2017 15:00), Max: 98.6 (17 Sep 2017 03:00)  HR: 76 (17 Sep 2017 15:00) (55 - 77)  BP: 143/63 (17 Sep 2017 14:00) (129/59 - 150/65)  BP(mean): 91 (17 Sep 2017 14:00) (85 - 100)  ABP: 169/52 (17 Sep 2017 12:00) (94/42 - 175/45)  ABP(mean): 89 (17 Sep 2017 12:00) (61 - 114)  RR: 26 (17 Sep 2017 15:00) (15 - 45)  SpO2: 97% (17 Sep 2017 15:00) (96% - 100%)      CAPILLARY BLOOD GLUCOSE          I&O's Detail    16 Sep 2017 07:01  -  17 Sep 2017 07:00  --------------------------------------------------------  IN:    dextrose 5% + sodium chloride 0.45%.: 1900 mL    IV PiggyBack: 100 mL    lactated ringers.: 400 mL    Packed Red Blood Cells: 320 mL    pantoprazole Infusion: 230 mL  Total IN: 2950 mL    OUT:  Total OUT: 0 mL    Total NET: 2950 mL      17 Sep 2017 07:01  -  17 Sep 2017 16:22  --------------------------------------------------------  IN:    dextrose 5% + sodium chloride 0.45%.: 375 mL    IV PiggyBack: 150 mL    Oral Fluid: 300 mL    pantoprazole Infusion: 70 mL  Total IN: 895 mL    OUT:    Incontinent per Condom Catheter: 600 mL  Total OUT: 600 mL    Total NET: 295 mL            MEDICATIONS  (STANDING):  donepezil 10 milliGRAM(s) Oral at bedtime  latanoprost 0.005% Ophthalmic Solution 1 Drop(s) Both EYES at bedtime  levothyroxine 50 MICROGram(s) Oral daily  lidocaine   Patch 1 Patch Transdermal every 24 hours  tamsulosin 0.4 milliGRAM(s) Oral at bedtime  ALBUTerol/ipratropium for Nebulization 3 milliLiter(s) Nebulizer three times a day  thiamine 100 milliGRAM(s) Oral daily  pantoprazole Infusion 8 mG/Hr (10 mL/Hr) IV Continuous <Continuous>  dextrose 5% + sodium chloride 0.45%. 1000 milliLiter(s) (75 mL/Hr) IV Continuous <Continuous>  memantine 10 milliGRAM(s) Oral every 12 hours  QUEtiapine 12.5 milliGRAM(s) Oral <User Schedule>  citalopram 10 milliGRAM(s) Oral at bedtime    MEDICATIONS  (PRN):  hydrALAZINE Injectable 10 milliGRAM(s) IV Push every 4 hours PRN Systolic greater than 180  acetaminophen   Tablet. 325 milliGRAM(s) Oral every 4 hours PRN Mild Pain (1 - 3)        Physical Exam:  Gen: NAD  LS:  Card:  GI:  Ext:       Labs:    09-17    148<H>  |  115<H>  |  29<H>  ----------------------------<  199<H>  5.3   |  19<L>  |  0.97    Ca    8.1<L>      17 Sep 2017 09:02  Phos  5.5     09-17  Mg     1.9     09-17                              8.8    12.0  )-----------( 217      ( 17 Sep 2017 09:02 )             25.8     PT/INR - ( 17 Sep 2017 03:50 )   PT: 13.7 sec;   INR: 1.26 ratio         PTT - ( 17 Sep 2017 03:50 )  PTT:24.9 sec          Imaging:  CXR: 9/17  FINDINGS:  Right chest wall dual lead pacemaker.  Intra-aortic balloon pump overlies   the descending aorta. Surgical clips overlie the lower, right neck.  No focal lung consolidations.  There are no pleural effusions or pneumothorax.  The cardiomediastinal silhouette is within normal limits.  Status post left total shoulder arthroplasty.    IMPRESSION:   Lines and tubes as described above. No focal lung consolidations. [facility] Surgery Progress Note     Subjective/24hour Events: Patient seen and examined this AM. s/p endoscopy with clipping and cauterization of duodenal ulcers. No bloody BMs since. Received 1 unit PRBCs this AM. Continues to sundown.    Vital Signs:  ICU Vital Signs Last 24 Hrs  T(C): 37 (17 Sep 2017 15:00), Max: 37 (17 Sep 2017 03:00)  T(F): 98.6 (17 Sep 2017 15:00), Max: 98.6 (17 Sep 2017 03:00)  HR: 76 (17 Sep 2017 15:00) (55 - 77)  BP: 143/63 (17 Sep 2017 14:00) (129/59 - 150/65)  BP(mean): 91 (17 Sep 2017 14:00) (85 - 100)  ABP: 169/52 (17 Sep 2017 12:00) (94/42 - 175/45)  ABP(mean): 89 (17 Sep 2017 12:00) (61 - 114)  RR: 26 (17 Sep 2017 15:00) (15 - 45)  SpO2: 97% (17 Sep 2017 15:00) (96% - 100%)    I&O's Detail    16 Sep 2017 07:01  -  17 Sep 2017 07:00  --------------------------------------------------------  IN:    dextrose 5% + sodium chloride 0.45%.: 1900 mL    IV PiggyBack: 100 mL    lactated ringers.: 400 mL    Packed Red Blood Cells: 320 mL    pantoprazole Infusion: 230 mL  Total IN: 2950 mL    OUT:  Total OUT: 0 mL    Total NET: 2950 mL    17 Sep 2017 07:01  -  17 Sep 2017 16:22  --------------------------------------------------------  IN:    dextrose 5% + sodium chloride 0.45%.: 375 mL    IV PiggyBack: 150 mL    Oral Fluid: 300 mL    pantoprazole Infusion: 70 mL  Total IN: 895 mL    OUT:    Incontinent per Condom Catheter: 600 mL  Total OUT: 600 mL    Total NET: 295 mL    MEDICATIONS  (STANDING):  donepezil 10 milliGRAM(s) Oral at bedtime  latanoprost 0.005% Ophthalmic Solution 1 Drop(s) Both EYES at bedtime  levothyroxine 50 MICROGram(s) Oral daily  lidocaine   Patch 1 Patch Transdermal every 24 hours  tamsulosin 0.4 milliGRAM(s) Oral at bedtime  ALBUTerol/ipratropium for Nebulization 3 milliLiter(s) Nebulizer three times a day  thiamine 100 milliGRAM(s) Oral daily  pantoprazole Infusion 8 mG/Hr (10 mL/Hr) IV Continuous <Continuous>  dextrose 5% + sodium chloride 0.45%. 1000 milliLiter(s) (75 mL/Hr) IV Continuous <Continuous>  memantine 10 milliGRAM(s) Oral every 12 hours  QUEtiapine 12.5 milliGRAM(s) Oral <User Schedule>  citalopram 10 milliGRAM(s) Oral at bedtime    MEDICATIONS  (PRN):  hydrALAZINE Injectable 10 milliGRAM(s) IV Push every 4 hours PRN Systolic greater than 180  acetaminophen   Tablet. 325 milliGRAM(s) Oral every 4 hours PRN Mild Pain (1 - 3)    Physical Exam:  Gen: NAD, resting comfortably  Resp: non-labored breathing    Labs:    09-17    148<H>  |  115<H>  |  29<H>  ----------------------------<  199<H>  5.3   |  19<L>  |  0.97    Ca    8.1<L>      17 Sep 2017 09:02  Phos  5.5     09-17  Mg     1.9     09-17                    8.8    12.0  )-----------( 217      ( 17 Sep 2017 09:02 )             25.8     PT/INR - ( 17 Sep 2017 03:50 )   PT: 13.7 sec;   INR: 1.26 ratio      PTT - ( 17 Sep 2017 03:50 )  PTT:24.9 sec    Imaging:  CXR: 9/17  FINDINGS:  Right chest wall dual lead pacemaker.  Intra-aortic balloon pump overlies   the descending aorta. Surgical clips overlie the lower, right neck.  No focal lung consolidations.  There are no pleural effusions or pneumothorax.  The cardiomediastinal silhouette is within normal limits.  Status post left total shoulder arthroplasty.    IMPRESSION:   Lines and tubes as described above. No focal lung consolidations.

## 2017-09-17 NOTE — PROGRESS NOTE ADULT - ASSESSMENT
87 yo male s/p fall with GI bleed 87 yo male s/p fall with GI bleed s/p upper endoscopy and treatment for duodenal ulcers    -Continue to trend H/H, transfuse as indicated  -protonix gtt  -monitor vital signs, pulse ox  -seroquel for agitation  -pain control  -Appreciate SICU care  -dispo planning    Antwon Marie MD

## 2017-09-17 NOTE — PROGRESS NOTE ADULT - ASSESSMENT
87 y/o M w/ hx of HTN HLD CAD s/p PPM, dementia aaox 1-2 at baseline, glaucoma, hypothyroidism and parotid neoplasm who presented s/p fall in the bathtub found to have  R rib 5-8 fractures and pneumothorax s/p chest tube and removal.   Pneumothorax resolved.        Acute Blood Loss Anemia - 2/2 bleeding duodenal ulcer, now s/p clipping and APC, H/h now stable, GI appreciated, c/w SICU level of care    Duodenal Ulcer - s/p clipping and APC, avoid anticoagulation for now    Rib Fx - Pain controlled     Bradycardia -resolved hr stable off bb currently     Bladder wall thickening / Cystitis - finished course of abx    HTN HLD CAD s/p PPM - avoid aggressive control in elderly monitor c/w telemetry     Dementia /Delerium - Psy consult appreciated, c/w lower dose seroquel  thiamine      DVT PPX

## 2017-09-17 NOTE — PROGRESS NOTE ADULT - SUBJECTIVE AND OBJECTIVE BOX
SICU Daily Progress Note  =====================================================  Interval/Overnight Events:     Patient status post endoscopy with GI yesterday with evidence of duodenal ulcers that were clipped and cauterized. Had one melenotic stool during the procedure, but none other since. Transfused one unit yesterday, to which he responded appropriately, although this am hematocrit slowly downtrending again. Patient with severe agitation and sundowning in the evening-time.     Patient is a 88 year old male with a PMHx of CAD s/p stent and PPM, and dementia who presented on 9/5/2017 s/p mechanical fall. Patient was initially admitted to the SICU for respiratory monitoring as the patient had sustained right 4th-8th rib fractures and a large right pneumothorax s/p pigtail catheter placement. He was stabilized from a respiratory standpoint and the right pigtail catheter was d/lowell with no evidence of pneumothorax afterwards. He was then transferred to the floors on 9/8/2017. Called to bedside today at approx. 1630 for a code blue. Patient was found minimally responsive and had a very large bowel movement with blood clots. Additionally, patient was hypotensive with a systolic BP in the 80s. Stat labs were drawn and patient was given 2 L of LR and 2 units of PRBCs. His systolic blood pressure improved to the 140s and patient started becoming more responsive. Patient was subsequently transferred to the SICU for further care. In the SICU, a right radial arterial line was placed and NGT placed & gastric lavage was performed, which was clear with no evidence of blood. GI was consulted and recommended making the patient NPO, starting a Protonix gtt, and administered bowel prep for an endoscopy & colonoscopy tomorrow morning. Patient will be monitored in the SICU on the hemorrhage watch protocol.    Allergies: No Known Allergies      MEDICATIONS:   --------------------------------------------------------------------------------------  Neurologic Medications  citalopram 10 milliGRAM(s) Oral daily  donepezil 10 milliGRAM(s) Oral at bedtime  acetaminophen   Tablet. 325 milliGRAM(s) Oral every 4 hours PRN Mild Pain (1 - 3)  QUEtiapine 12.5 milliGRAM(s) Oral every 8 hours PRN agitation    Respiratory Medications  ALBUTerol/ipratropium for Nebulization 3 milliLiter(s) Nebulizer three times a day    Cardiovascular Medications  hydrALAZINE Injectable 10 milliGRAM(s) IV Push every 4 hours PRN Systolic greater than 180  tamsulosin 0.4 milliGRAM(s) Oral at bedtime    Gastrointestinal Medications  thiamine 100 milliGRAM(s) Oral daily  pantoprazole Infusion 8 mG/Hr IV Continuous <Continuous>  dextrose 5% + sodium chloride 0.45%. 1000 milliLiter(s) IV Continuous <Continuous>  potassium phosphate IVPB 15 milliMole(s) IV Intermittent once  potassium chloride  10 mEq/100 mL IVPB 10 milliEquivalent(s) IV Intermittent every 1 hour    Genitourinary Medications    Hematologic/Oncologic Medications    Antimicrobial/Immunologic Medications    Endocrine/Metabolic Medications  levothyroxine 50 MICROGram(s) Oral daily    Topical/Other Medications  latanoprost 0.005% Ophthalmic Solution 1 Drop(s) Both EYES at bedtime  lidocaine   Patch 1 Patch Transdermal every 24 hours    --------------------------------------------------------------------------------------    VITAL SIGNS, INS/OUTS (last 24 hours):  --------------------------------------------------------------------------------------  ((Insert SICU Vitals/Is+Os here))***  --------------------------------------------------------------------------------------    EXAM  NEUROLOGY  RASS:   	GCS:    Exam: Seems somewhat distressed this am, alert, oriented x1, no focal deficits.   HEENT  Exam: Normocephalic, atraumatic, EOMI.    RESPIRATORY  Exam: Lungs clear to auscultation, Normal expansion/effort.  CARDIOVASCULAR  Exam: S1, S2.  Regular rate and rhythm.     GI/NUTRITION  Exam: Abdomen soft, Non-tender, Non-distended.    VASCULAR  Exam: Extremities warm, pink, well-perfused.   MUSCULOSKELETAL  Exam: All extremities moving spontaneously without limitations.  SKIN  Exam: Good skin turgor, no skin breakdown.     METABOLIC/FLUIDS/ELECTROLYTES  thiamine 100 milliGRAM(s) Oral daily  dextrose 5% + sodium chloride 0.45%. 1000 milliLiter(s) IV Continuous <Continuous>  potassium phosphate IVPB 15 milliMole(s) IV Intermittent once  potassium chloride  10 mEq/100 mL IVPB 10 milliEquivalent(s) IV Intermittent every 1 hour      HEMATOLOGIC  [x] VTE Prophylaxis:   Transfusions:	[] PRBC	[] Platelets		[] FFP	[] Cryoprecipitate    INFECTIOUS DISEASE  Antimicrobials/Immunologic Medications:    Day #      of     ***    Tubes/Lines/Drains  ***  [x] Peripheral IV  [] Central Venous Line     	[] R	[] L	[] IJ	[] Fem	[] SC	Date Placed:   [] Arterial Line		[] R	[] L	[] Fem	[] Rad	[] Ax	Date Placed:   [] PICC		[] Midline		[] Mediport  [] Urinary Catheter		Date Placed:   [x] Necessity of urinary, arterial, and venous catheters discussed    LABS  --------------------------------------------------------------------------------------  ((Insert SICU Labs here))***  --------------------------------------------------------------------------------------    OTHER LABORATORY:     IMAGING STUDIES:   CXR: SICU Daily Progress Note  =====================================================  Interval/Overnight Events:     Patient status post endoscopy with GI yesterday with evidence of duodenal ulcers that were clipped and cauterized. Had one melenotic stool during the procedure, but none other since. Transfused one unit yesterday, to which he responded appropriately, although this am hematocrit slowly downtrending again. Patient with severe agitation and sundowning in the evening-time.     Patient is a 88 year old male with a PMHx of CAD s/p stent and PPM, and dementia who presented on 9/5/2017 s/p mechanical fall. Patient was initially admitted to the SICU for respiratory monitoring as the patient had sustained right 4th-8th rib fractures and a large right pneumothorax s/p pigtail catheter placement. He was stabilized from a respiratory standpoint and the right pigtail catheter was d/lowell with no evidence of pneumothorax afterwards. He was then transferred to the floors on 9/8/2017. Called to bedside today at approx. 1630 for a code blue. Patient was found minimally responsive and had a very large bowel movement with blood clots. Additionally, patient was hypotensive with a systolic BP in the 80s. Stat labs were drawn and patient was given 2 L of LR and 2 units of PRBCs. His systolic blood pressure improved to the 140s and patient started becoming more responsive. Patient was subsequently transferred to the SICU for further care. In the SICU, a right radial arterial line was placed and NGT placed & gastric lavage was performed, which was clear with no evidence of blood. GI was consulted and recommended making the patient NPO, starting a Protonix gtt, and administered bowel prep for an endoscopy & colonoscopy tomorrow morning. Patient will be monitored in the SICU on the hemorrhage watch protocol.    Allergies: No Known Allergies      MEDICATIONS:   --------------------------------------------------------------------------------------  Neurologic Medications  citalopram 10 milliGRAM(s) Oral daily  donepezil 10 milliGRAM(s) Oral at bedtime  acetaminophen   Tablet. 325 milliGRAM(s) Oral every 4 hours PRN Mild Pain (1 - 3)  QUEtiapine 12.5 milliGRAM(s) Oral every 8 hours PRN agitation    Respiratory Medications  ALBUTerol/ipratropium for Nebulization 3 milliLiter(s) Nebulizer three times a day    Cardiovascular Medications  hydrALAZINE Injectable 10 milliGRAM(s) IV Push every 4 hours PRN Systolic greater than 180  tamsulosin 0.4 milliGRAM(s) Oral at bedtime    Gastrointestinal Medications  thiamine 100 milliGRAM(s) Oral daily  pantoprazole Infusion 8 mG/Hr IV Continuous <Continuous>  dextrose 5% + sodium chloride 0.45%. 1000 milliLiter(s) IV Continuous <Continuous>  potassium phosphate IVPB 15 milliMole(s) IV Intermittent once  potassium chloride  10 mEq/100 mL IVPB 10 milliEquivalent(s) IV Intermittent every 1 hour    Genitourinary Medications    Hematologic/Oncologic Medications    Antimicrobial/Immunologic Medications    Endocrine/Metabolic Medications  levothyroxine 50 MICROGram(s) Oral daily    Topical/Other Medications  latanoprost 0.005% Ophthalmic Solution 1 Drop(s) Both EYES at bedtime  lidocaine   Patch 1 Patch Transdermal every 24 hours    --------------------------------------------------------------------------------------    VITAL SIGNS, INS/OUTS (last 24 hours):  --------------------------------------------------------------------------------------  T(C): 37 (09-17-17 @ 03:00), Max: 37.2 (09-16-17 @ 11:25)  HR: 66 (09-17-17 @ 07:00) (53 - 73)  BP: 129/59 (09-17-17 @ 00:00) (129/59 - 129/59)  BP(mean): 85 (09-17-17 @ 00:00) (85 - 85)  ABP: 164/94 (09-17-17 @ 07:00) (94/42 - 175/45)  ABP(mean): 113 (09-17-17 @ 07:00) (61 - 113)  RR: 24 (09-17-17 @ 07:00) (15 - 45)  SpO2: 100% (09-17-17 @ 07:00) (88% - 100%)  Wt(kg): --  CVP(mm Hg): --  CI: --  CAPILLARY BLOOD GLUCOSE       N/A      09-16 @ 07:01  -  09-17 @ 07:00  --------------------------------------------------------  IN:    dextrose 5% + sodium chloride 0.45%.: 1900 mL    IV PiggyBack: 100 mL    lactated ringers.: 400 mL    Packed Red Blood Cells: 320 mL    pantoprazole Infusion: 230 mL  Total IN: 2950 mL    OUT:  Total OUT: 0 mL    Total NET: 2950 mL        --------------------------------------------------------------------------------------    EXAM  NEUROLOGY  RASS:   	GCS:    Exam: Seems somewhat distressed this am, alert, oriented x1, no focal deficits.   HEENT  Exam: Normocephalic, atraumatic, EOMI.    RESPIRATORY  Exam: Lungs clear to auscultation, Normal expansion/effort.  CARDIOVASCULAR  Exam: S1, S2.  Regular rate and rhythm.     GI/NUTRITION  Exam: Abdomen soft, Non-tender, Non-distended.    VASCULAR  Exam: Extremities warm, pink, well-perfused.   MUSCULOSKELETAL  Exam: All extremities moving spontaneously without limitations.  SKIN  Exam: Good skin turgor, no skin breakdown.     METABOLIC/FLUIDS/ELECTROLYTES  thiamine 100 milliGRAM(s) Oral daily  dextrose 5% + sodium chloride 0.45%. 1000 milliLiter(s) IV Continuous <Continuous>  potassium phosphate IVPB 15 milliMole(s) IV Intermittent once  potassium chloride  10 mEq/100 mL IVPB 10 milliEquivalent(s) IV Intermittent every 1 hour      HEMATOLOGIC  [x] VTE Prophylaxis:   Transfusions:	[] PRBC	[] Platelets		[] FFP	[] Cryoprecipitate    INFECTIOUS DISEASE  Antimicrobials/Immunologic Medications:    Day #      of     ***    Tubes/Lines/Drains  ***  [x] Peripheral IV  [] Central Venous Line     	[] R	[] L	[] IJ	[] Fem	[] SC	Date Placed:   [] Arterial Line		[] R	[] L	[] Fem	[] Rad	[] Ax	Date Placed:   [] PICC		[] Midline		[] Mediport  [] Urinary Catheter		Date Placed:   [x] Necessity of urinary, arterial, and venous catheters discussed    LABS  --------------------------------------------------------------------------------------  CBC Full  -  ( 17 Sep 2017 03:50 )  WBC Count : 11.5 K/uL  Hemoglobin : 7.8 g/dL  Hematocrit : 22.1 %  Platelet Count - Automated : 209 K/uL  Mean Cell Volume : 93.1 fl  Mean Cell Hemoglobin : 32.7 pg  Mean Cell Hemoglobin Concentration : 35.2 gm/dL  Auto Neutrophil # : x  Auto Lymphocyte # : x  Auto Monocyte # : x  Auto Eosinophil # : x  Auto Basophil # : x  Auto Neutrophil % : x  Auto Lymphocyte % : x  Auto Monocyte % : x  Auto Eosinophil % : x  Auto Basophil % : x    09-17    148<H>  |  116<H>  |  35<H>  ----------------------------<  125<H>  3.5   |  24  |  0.98    Ca    8.3<L>      17 Sep 2017 03:50  Phos  2.1     09-17  Mg     2.1     09-17        CAPILLARY BLOOD GLUCOSE          PT/INR - ( 17 Sep 2017 03:50 )   PT: 13.7 sec;   INR: 1.26 ratio         PTT - ( 17 Sep 2017 03:50 )  PTT:24.9 sec  --------------------------------------------------------------------------------------    OTHER LABORATORY:     IMAGING STUDIES:   CXR:

## 2017-09-18 LAB
ALBUMIN SERPL ELPH-MCNC: 2.9 G/DL — LOW (ref 3.3–5)
ALP SERPL-CCNC: 80 U/L — SIGNIFICANT CHANGE UP (ref 40–120)
ALT FLD-CCNC: 35 U/L RC — SIGNIFICANT CHANGE UP (ref 10–45)
ANION GAP SERPL CALC-SCNC: 12 MMOL/L — SIGNIFICANT CHANGE UP (ref 5–17)
APTT BLD: 24.9 SEC — LOW (ref 27.5–37.4)
AST SERPL-CCNC: 34 U/L — SIGNIFICANT CHANGE UP (ref 10–40)
BASOPHILS # BLD AUTO: 0 K/UL — SIGNIFICANT CHANGE UP (ref 0–0.2)
BASOPHILS NFR BLD AUTO: 0.1 % — SIGNIFICANT CHANGE UP (ref 0–2)
BILIRUB SERPL-MCNC: 0.4 MG/DL — SIGNIFICANT CHANGE UP (ref 0.2–1.2)
BLD GP AB SCN SERPL QL: NEGATIVE — SIGNIFICANT CHANGE UP
BUN SERPL-MCNC: 22 MG/DL — SIGNIFICANT CHANGE UP (ref 7–23)
CALCIUM SERPL-MCNC: 8.8 MG/DL — SIGNIFICANT CHANGE UP (ref 8.4–10.5)
CHLORIDE SERPL-SCNC: 115 MMOL/L — HIGH (ref 96–108)
CO2 SERPL-SCNC: 21 MMOL/L — LOW (ref 22–31)
CREAT SERPL-MCNC: 1 MG/DL — SIGNIFICANT CHANGE UP (ref 0.5–1.3)
EOSINOPHIL # BLD AUTO: 0.3 K/UL — SIGNIFICANT CHANGE UP (ref 0–0.5)
EOSINOPHIL NFR BLD AUTO: 2.7 % — SIGNIFICANT CHANGE UP (ref 0–6)
GLUCOSE SERPL-MCNC: 110 MG/DL — HIGH (ref 70–99)
HCT VFR BLD CALC: 27.2 % — LOW (ref 39–50)
HCT VFR BLD CALC: 27.5 % — LOW (ref 39–50)
HGB BLD-MCNC: 9.5 G/DL — LOW (ref 13–17)
HGB BLD-MCNC: 9.8 G/DL — LOW (ref 13–17)
INR BLD: 1.16 RATIO — SIGNIFICANT CHANGE UP (ref 0.88–1.16)
LYMPHOCYTES # BLD AUTO: 1.6 K/UL — SIGNIFICANT CHANGE UP (ref 1–3.3)
LYMPHOCYTES # BLD AUTO: 13.3 % — SIGNIFICANT CHANGE UP (ref 13–44)
MAGNESIUM SERPL-MCNC: 2.3 MG/DL — SIGNIFICANT CHANGE UP (ref 1.6–2.6)
MCHC RBC-ENTMCNC: 32.7 PG — SIGNIFICANT CHANGE UP (ref 27–34)
MCHC RBC-ENTMCNC: 33.5 PG — SIGNIFICANT CHANGE UP (ref 27–34)
MCHC RBC-ENTMCNC: 34.6 GM/DL — SIGNIFICANT CHANGE UP (ref 32–36)
MCHC RBC-ENTMCNC: 35.9 GM/DL — SIGNIFICANT CHANGE UP (ref 32–36)
MCV RBC AUTO: 93.3 FL — SIGNIFICANT CHANGE UP (ref 80–100)
MCV RBC AUTO: 94.5 FL — SIGNIFICANT CHANGE UP (ref 80–100)
MONOCYTES # BLD AUTO: 0.6 K/UL — SIGNIFICANT CHANGE UP (ref 0–0.9)
MONOCYTES NFR BLD AUTO: 5.2 % — SIGNIFICANT CHANGE UP (ref 2–14)
NEUTROPHILS # BLD AUTO: 9.3 K/UL — HIGH (ref 1.8–7.4)
NEUTROPHILS NFR BLD AUTO: 78.8 % — HIGH (ref 43–77)
PHOSPHATE SERPL-MCNC: 2.6 MG/DL — SIGNIFICANT CHANGE UP (ref 2.5–4.5)
PLATELET # BLD AUTO: 207 K/UL — SIGNIFICANT CHANGE UP (ref 150–400)
PLATELET # BLD AUTO: 211 K/UL — SIGNIFICANT CHANGE UP (ref 150–400)
POTASSIUM SERPL-MCNC: 4.3 MMOL/L — SIGNIFICANT CHANGE UP (ref 3.5–5.3)
POTASSIUM SERPL-SCNC: 4.3 MMOL/L — SIGNIFICANT CHANGE UP (ref 3.5–5.3)
PROT SERPL-MCNC: 5.2 G/DL — LOW (ref 6–8.3)
PROTHROM AB SERPL-ACNC: 12.6 SEC — SIGNIFICANT CHANGE UP (ref 9.8–12.7)
RBC # BLD: 2.91 M/UL — LOW (ref 4.2–5.8)
RBC # BLD: 2.92 M/UL — LOW (ref 4.2–5.8)
RBC # FLD: 14.5 % — SIGNIFICANT CHANGE UP (ref 10.3–14.5)
RBC # FLD: 14.9 % — HIGH (ref 10.3–14.5)
RH IG SCN BLD-IMP: POSITIVE — SIGNIFICANT CHANGE UP
SODIUM SERPL-SCNC: 148 MMOL/L — HIGH (ref 135–145)
WBC # BLD: 10.2 K/UL — SIGNIFICANT CHANGE UP (ref 3.8–10.5)
WBC # BLD: 11.8 K/UL — HIGH (ref 3.8–10.5)
WBC # FLD AUTO: 10.2 K/UL — SIGNIFICANT CHANGE UP (ref 3.8–10.5)
WBC # FLD AUTO: 11.8 K/UL — HIGH (ref 3.8–10.5)

## 2017-09-18 PROCEDURE — 99232 SBSQ HOSP IP/OBS MODERATE 35: CPT

## 2017-09-18 PROCEDURE — 99232 SBSQ HOSP IP/OBS MODERATE 35: CPT | Mod: GC

## 2017-09-18 PROCEDURE — 71010: CPT | Mod: 26

## 2017-09-18 PROCEDURE — 93010 ELECTROCARDIOGRAM REPORT: CPT

## 2017-09-18 RX ORDER — OLANZAPINE 15 MG/1
5 TABLET, FILM COATED ORAL ONCE
Qty: 0 | Refills: 0 | Status: COMPLETED | OUTPATIENT
Start: 2017-09-18 | End: 2017-09-18

## 2017-09-18 RX ORDER — LABETALOL HCL 100 MG
10 TABLET ORAL ONCE
Qty: 0 | Refills: 0 | Status: DISCONTINUED | OUTPATIENT
Start: 2017-09-18 | End: 2017-09-18

## 2017-09-18 RX ORDER — SODIUM CHLORIDE 9 MG/ML
1000 INJECTION, SOLUTION INTRAVENOUS
Qty: 0 | Refills: 0 | Status: DISCONTINUED | OUTPATIENT
Start: 2017-09-18 | End: 2017-09-20

## 2017-09-18 RX ORDER — PANTOPRAZOLE SODIUM 20 MG/1
40 TABLET, DELAYED RELEASE ORAL
Qty: 0 | Refills: 0 | Status: DISCONTINUED | OUTPATIENT
Start: 2017-09-19 | End: 2017-09-19

## 2017-09-18 RX ORDER — HALOPERIDOL DECANOATE 100 MG/ML
1 INJECTION INTRAMUSCULAR AT BEDTIME
Qty: 0 | Refills: 0 | Status: DISCONTINUED | OUTPATIENT
Start: 2017-09-18 | End: 2017-09-19

## 2017-09-18 RX ORDER — ENOXAPARIN SODIUM 100 MG/ML
40 INJECTION SUBCUTANEOUS EVERY 24 HOURS
Qty: 0 | Refills: 0 | Status: DISCONTINUED | OUTPATIENT
Start: 2017-09-18 | End: 2017-09-20

## 2017-09-18 RX ORDER — HALOPERIDOL DECANOATE 100 MG/ML
1 INJECTION INTRAMUSCULAR EVERY 8 HOURS
Qty: 0 | Refills: 0 | Status: DISCONTINUED | OUTPATIENT
Start: 2017-09-18 | End: 2017-09-20

## 2017-09-18 RX ADMIN — DONEPEZIL HYDROCHLORIDE 10 MILLIGRAM(S): 10 TABLET, FILM COATED ORAL at 21:16

## 2017-09-18 RX ADMIN — Medication 10 MILLIGRAM(S): at 21:15

## 2017-09-18 RX ADMIN — PANTOPRAZOLE SODIUM 10 MG/HR: 20 TABLET, DELAYED RELEASE ORAL at 11:43

## 2017-09-18 RX ADMIN — LATANOPROST 1 DROP(S): 0.05 SOLUTION/ DROPS OPHTHALMIC; TOPICAL at 21:15

## 2017-09-18 RX ADMIN — Medication 100 MILLIGRAM(S): at 11:41

## 2017-09-18 RX ADMIN — CITALOPRAM 10 MILLIGRAM(S): 10 TABLET, FILM COATED ORAL at 21:16

## 2017-09-18 RX ADMIN — PANTOPRAZOLE SODIUM 10 MG/HR: 20 TABLET, DELAYED RELEASE ORAL at 21:14

## 2017-09-18 RX ADMIN — Medication 50 MICROGRAM(S): at 05:08

## 2017-09-18 RX ADMIN — OLANZAPINE 5 MILLIGRAM(S): 15 TABLET, FILM COATED ORAL at 21:54

## 2017-09-18 RX ADMIN — Medication 0.5 MILLIGRAM(S): at 02:00

## 2017-09-18 RX ADMIN — MEMANTINE HYDROCHLORIDE 10 MILLIGRAM(S): 10 TABLET ORAL at 05:08

## 2017-09-18 RX ADMIN — MEMANTINE HYDROCHLORIDE 10 MILLIGRAM(S): 10 TABLET ORAL at 17:51

## 2017-09-18 RX ADMIN — HALOPERIDOL DECANOATE 1 MILLIGRAM(S): 100 INJECTION INTRAMUSCULAR at 21:15

## 2017-09-18 RX ADMIN — Medication 3 MILLILITER(S): at 22:03

## 2017-09-18 RX ADMIN — LIDOCAINE 1 PATCH: 4 CREAM TOPICAL at 23:13

## 2017-09-18 RX ADMIN — SODIUM CHLORIDE 50 MILLILITER(S): 9 INJECTION, SOLUTION INTRAVENOUS at 22:03

## 2017-09-18 RX ADMIN — LIDOCAINE 1 PATCH: 4 CREAM TOPICAL at 11:47

## 2017-09-18 RX ADMIN — Medication 3 MILLILITER(S): at 15:31

## 2017-09-18 RX ADMIN — Medication 0.5 MILLIGRAM(S): at 20:50

## 2017-09-18 RX ADMIN — ENOXAPARIN SODIUM 40 MILLIGRAM(S): 100 INJECTION SUBCUTANEOUS at 11:47

## 2017-09-18 RX ADMIN — SODIUM CHLORIDE 75 MILLILITER(S): 9 INJECTION, SOLUTION INTRAVENOUS at 11:42

## 2017-09-18 RX ADMIN — TAMSULOSIN HYDROCHLORIDE 0.4 MILLIGRAM(S): 0.4 CAPSULE ORAL at 21:16

## 2017-09-18 RX ADMIN — Medication 3 MILLILITER(S): at 05:11

## 2017-09-18 NOTE — PROGRESS NOTE ADULT - ASSESSMENT
87 y/o M w/ hx of HTN HLD CAD s/p PPM, dementia aaox 1-2 at baseline, glaucoma, hypothyroidism and parotid neoplasm who presented s/p fall in the bathtub found to have  R rib 5-8 fractures and pneumothorax s/p chest tube and removal.   Pneumothorax resolved.        Acute Blood Loss Anemia - 2/2 bleeding duodenal ulcer, now s/p clipping and APC, H/h now stable, GI appreciated, c/w SICU level of care    Duodenal Ulcer - s/p clipping and APC, avoid anticoagulation for now    Rib Fx - Pain controlled     Bradycardia -resolved hr stable off bb currently     Bladder wall thickening / Cystitis - finished course of abx    HTN HLD CAD s/p PPM - avoid aggressive control in elderly monitor c/w telemetry     Dementia /Delerium - Psy consult appreciated, will try low dose haldol    DVT PPX

## 2017-09-18 NOTE — PROVIDER CONTACT NOTE (OTHER) - REASON
Large amount of bloody drainage around brunner catheter
Melanotic stool
pt is restless with some rhonki,  attempted to pull on brunner. poor urine output. some leakage noted. and son wants to speak to attending.
High BP
leaking from brunner

## 2017-09-18 NOTE — PROGRESS NOTE ADULT - SUBJECTIVE AND OBJECTIVE BOX
HISTORY  88y Male Patient is a 88 year old male with a PMHx of CAD s/p stent and PPM, and dementia who presented on 9/5/2017 s/p mechanical fall. Patient was initially admitted to the SICU for respiratory monitoring as the patient had sustained right 4th-8th rib fractures and a large right pneumothorax s/p pigtail catheter placement. He was stabilized from a respiratory standpoint and the right pigtail catheter was d/lowell with no evidence of pneumothorax afterwards. He was then transferred to the floors on 9/8/2017. Called to bedside today at approx. 1630 for a code blue. Patient was found minimally responsive and had a very large bowel movement with blood clots. Additionally, patient was hypotensive with a systolic BP in the 80s. Stat labs were drawn and patient was given 2 L of LR and 2 units of PRBCs. His systolic blood pressure improved to the 140s and patient started becoming more responsive. Patient was subsequently transferred to the SICU for further care. In the SICU, a right radial arterial line was placed and NGT placed & gastric lavage was performed, which was clear with no evidence of blood. GI was consulted and recommended making the patient NPO, starting a Protonix gtt, and administered bowel prep for an endoscopy & colonoscopy tomorrow morning. Patient will be monitored in the SICU on the hemorrhage watch protocol.    24 HOUR EVENTS: H/H continue to be stable at 9.5/27.5.    SUBJECTIVE/ROS:  [x] A ten-point review of systems was otherwise negative except as noted.  [ ] Due to altered mental status/intubation, subjective information were not able to be obtained from the patient. History was obtained, to the extent possible, from review of the chart and collateral sources of information.      NEURO  RASS:     GCS:     CAM ICU:  Exam: awake, alert, oriented  Meds: donepezil 10 milliGRAM(s) Oral at bedtime  acetaminophen   Tablet. 325 milliGRAM(s) Oral every 4 hours PRN Mild Pain (1 - 3)  memantine 10 milliGRAM(s) Oral every 12 hours  QUEtiapine 12.5 milliGRAM(s) Oral <User Schedule>  citalopram 10 milliGRAM(s) Oral at bedtime    [x] Adequacy of sedation and pain control has been assessed and adjusted      RESPIRATORY  RR: 19 (09-18-17 @ 04:00) (19 - 36)  SpO2: 97% (09-18-17 @ 04:00) (94% - 100%)  Wt(kg): --  Exam: unlabored, clear to auscultation bilaterally  Mechanical Ventilation:   ABG - ( 17 Sep 2017 04:31 )  pH: 7.43  /  pCO2: 38    /  pO2: 97    / HCO3: 25    / Base Excess: 1.1   /  SaO2: 98      Lactate: x          [N/A] Extubation Readiness Assessed  Meds: ALBUTerol/ipratropium for Nebulization 3 milliLiter(s) Nebulizer three times a day        CARDIOVASCULAR  HR: 60 (09-18-17 @ 04:00) (60 - 77)  BP: 162/91 (09-18-17 @ 04:00) (140/60 - 186/74)  BP(mean): 123 (09-18-17 @ 04:00) (80 - 123)  ABP: 169/52 (09-17-17 @ 12:00) (131/90 - 169/52)  ABP(mean): 89 (09-17-17 @ 12:00) (76 - 114)  Wt(kg): --  CVP(cm H2O): --      Exam: regular rate and rhythm  Cardiac Rhythm: sinus  Perfusion     [x]Adequate   [ ]Inadequate  Mentation   [x]Normal       [ ]Reduced  Extremities  [x]Warm         [ ]Cool  Volume Status [ ]Hypervolemic [x]Euvolemic [ ]Hypovolemic  Meds: hydrALAZINE Injectable 10 milliGRAM(s) IV Push every 4 hours PRN Systolic greater than 180  tamsulosin 0.4 milliGRAM(s) Oral at bedtime        GI/NUTRITION  Exam: soft, nontender, nondistended, incision C/D/I  Diet: CLD  Meds: pantoprazole Infusion 8 mG/Hr IV Continuous <Continuous>      GENITOURINARY  I&O's Detail    09-16 @ 07:01  -  09-17 @ 07:00  --------------------------------------------------------  IN:    dextrose 5% + sodium chloride 0.45%.: 1900 mL    IV PiggyBack: 100 mL    lactated ringers.: 400 mL    Packed Red Blood Cells: 320 mL    pantoprazole Infusion: 230 mL  Total IN: 2950 mL    OUT:  Total OUT: 0 mL    Total NET: 2950 mL      09-17 @ 07:01  -  09-18 @ 05:11  --------------------------------------------------------  IN:    dextrose 5% + sodium chloride 0.45%.: 1200 mL    IV PiggyBack: 150 mL    Oral Fluid: 300 mL    pantoprazole Infusion: 180 mL  Total IN: 1830 mL    OUT:    Incontinent per Condom Catheter: 1200 mL  Total OUT: 1200 mL    Total NET: 630 mL          09-18    148<H>  |  115<H>  |  22  ----------------------------<  110<H>  4.3   |  21<L>  |  1.00    Ca    8.8      18 Sep 2017 01:59  Phos  2.6     09-18  Mg     2.3     09-18    TPro  5.2<L>  /  Alb  2.9<L>  /  TBili  0.4  /  DBili  x   /  AST  34  /  ALT  35  /  AlkPhos  80  09-18    [ ] Read catheter, indication: N/A  Meds: thiamine 100 milliGRAM(s) Oral daily  dextrose 5% + sodium chloride 0.45%. 1000 milliLiter(s) IV Continuous <Continuous>        HEMATOLOGIC  Meds:   [x] VTE Prophylaxis                        9.5    11.8  )-----------( 207      ( 18 Sep 2017 01:59 )             27.5     PT/INR - ( 18 Sep 2017 01:59 )   PT: 12.6 sec;   INR: 1.16 ratio         PTT - ( 18 Sep 2017 01:59 )  PTT:24.9 sec  Transfusion     [ ] PRBC   [ ] Platelets   [ ] FFP   [ ] Cryoprecipitate      INFECTIOUS DISEASES  WBC Count: 11.8 K/uL (09-18 @ 01:59)  WBC Count: 13.7 K/uL (09-17 @ 16:50)  WBC Count: 12.0 K/uL (09-17 @ 09:02)    RECENT CULTURES:    Meds:       ENDOCRINE  CAPILLARY BLOOD GLUCOSE        Meds: levothyroxine 50 MICROGram(s) Oral daily        ACCESS DEVICES:  [ ] Peripheral IV  [ ] Central Venous Line	[ ] R	[ ] L	[ ] IJ	[ ] Fem	[ ] SC	Placed:   [ ] Arterial Line		[ ] R	[ ] L	[ ] Fem	[ ] Rad	[ ] Ax	Placed:   [ ] PICC:					[ ] Mediport  [ ] Urinary Catheter, Date Placed:   [x] Necessity of urinary, arterial, and venous catheters discussed    OTHER MEDICATIONS:  latanoprost 0.005% Ophthalmic Solution 1 Drop(s) Both EYES at bedtime  lidocaine   Patch 1 Patch Transdermal every 24 hours      CODE STATUS:      IMAGING: HISTORY  88y Male Patient is a 88 year old male with a PMHx of CAD s/p stent and PPM, and dementia who presented on 9/5/2017 s/p mechanical fall. Patient was initially admitted to the SICU for respiratory monitoring as the patient had sustained right 4th-8th rib fractures and a large right pneumothorax s/p pigtail catheter placement. He was stabilized from a respiratory standpoint and the right pigtail catheter was d/lowell with no evidence of pneumothorax afterwards. He was then transferred to the floors on 9/8/2017. Called to bedside today at approx. 1630 for a code blue. Patient was found minimally responsive and had a very large bowel movement with blood clots. Additionally, patient was hypotensive with a systolic BP in the 80s. Stat labs were drawn and patient was given 2 L of LR and 2 units of PRBCs. His systolic blood pressure improved to the 140s and patient started becoming more responsive. Patient was subsequently transferred to the SICU for further care. In the SICU, a right radial arterial line was placed and NGT placed & gastric lavage was performed, which was clear with no evidence of blood. GI was consulted and recommended making the patient NPO, starting a Protonix gtt, and administered bowel prep for an endoscopy & colonoscopy tomorrow morning. Patient will be monitored in the SICU on the hemorrhage watch protocol.    24 HOUR EVENTS: less confused overnight, restarted Seroquel, memantine H/H continue to be stable at 9.5/27.5, HDS.    SUBJECTIVE/ROS:  [x] A ten-point review of systems was otherwise negative except as noted.  [ ] Due to altered mental status/intubation, subjective information were not able to be obtained from the patient. History was obtained, to the extent possible, from review of the chart and collateral sources of information.      NEURO  RASS:     GCS:     CAM ICU:  Exam: awake, alert, oriented  Meds: donepezil 10 milliGRAM(s) Oral at bedtime  acetaminophen   Tablet. 325 milliGRAM(s) Oral every 4 hours PRN Mild Pain (1 - 3)  memantine 10 milliGRAM(s) Oral every 12 hours  QUEtiapine 12.5 milliGRAM(s) Oral <User Schedule>  citalopram 10 milliGRAM(s) Oral at bedtime  	  [x] Adequacy of sedation and pain control has been assessed and adjusted      RESPIRATORY  RR: 19 (09-18-17 @ 04:00) (19 - 36)  SpO2: 97% (09-18-17 @ 04:00) (94% - 100%)  Wt(kg): --  Exam: unlabored, clear to auscultation bilaterally  Mechanical Ventilation:   ABG - ( 17 Sep 2017 04:31 )  pH: 7.43  /  pCO2: 38    /  pO2: 97    / HCO3: 25    / Base Excess: 1.1   /  SaO2: 98      Lactate: x          [N/A] Extubation Readiness Assessed  Meds: ALBUTerol/ipratropium for Nebulization 3 milliLiter(s) Nebulizer three times a day        CARDIOVASCULAR  HR: 60 (09-18-17 @ 04:00) (60 - 77)  BP: 162/91 (09-18-17 @ 04:00) (140/60 - 186/74)  BP(mean): 123 (09-18-17 @ 04:00) (80 - 123)  ABP: 169/52 (09-17-17 @ 12:00) (131/90 - 169/52)  ABP(mean): 89 (09-17-17 @ 12:00) (76 - 114)  Wt(kg): --  CVP(cm H2O): --      Exam: regular rate and rhythm  Cardiac Rhythm: sinus  Perfusion     [x]Adequate   [ ]Inadequate  Mentation   [x]Normal       [ ]Reduced  Extremities  [x]Warm         [ ]Cool  Volume Status [ ]Hypervolemic [x]Euvolemic [ ]Hypovolemic  Meds: hydrALAZINE Injectable 10 milliGRAM(s) IV Push every 4 hours PRN Systolic greater than 180  tamsulosin 0.4 milliGRAM(s) Oral at bedtime        GI/NUTRITION  Exam: soft, nontender, nondistended, incision C/D/I  Diet: CLD  Meds: pantoprazole Infusion 8 mG/Hr IV Continuous <Continuous>      GENITOURINARY  I&O's Detail    09-16 @ 07:01  -  09-17 @ 07:00  --------------------------------------------------------  IN:    dextrose 5% + sodium chloride 0.45%.: 1900 mL    IV PiggyBack: 100 mL    lactated ringers.: 400 mL    Packed Red Blood Cells: 320 mL    pantoprazole Infusion: 230 mL  Total IN: 2950 mL    OUT:  Total OUT: 0 mL    Total NET: 2950 mL      09-17 @ 07:01  -  09-18 @ 05:11  --------------------------------------------------------  IN:    dextrose 5% + sodium chloride 0.45%.: 1200 mL    IV PiggyBack: 150 mL    Oral Fluid: 300 mL    pantoprazole Infusion: 180 mL  Total IN: 1830 mL    OUT:    Incontinent per Condom Catheter: 1200 mL  Total OUT: 1200 mL    Total NET: 630 mL          09-18    148<H>  |  115<H>  |  22  ----------------------------<  110<H>  4.3   |  21<L>  |  1.00    Ca    8.8      18 Sep 2017 01:59  Phos  2.6     09-18  Mg     2.3     09-18    TPro  5.2<L>  /  Alb  2.9<L>  /  TBili  0.4  /  DBili  x   /  AST  34  /  ALT  35  /  AlkPhos  80  09-18    [ ] Read catheter, indication: N/A  Meds: thiamine 100 milliGRAM(s) Oral daily  dextrose 5% + sodium chloride 0.45%. 1000 milliLiter(s) IV Continuous <Continuous>        HEMATOLOGIC  Meds:   [x] VTE Prophylaxis                        9.5    11.8  )-----------( 207      ( 18 Sep 2017 01:59 )             27.5     PT/INR - ( 18 Sep 2017 01:59 )   PT: 12.6 sec;   INR: 1.16 ratio         PTT - ( 18 Sep 2017 01:59 )  PTT:24.9 sec  Transfusion     [ ] PRBC   [ ] Platelets   [ ] FFP   [ ] Cryoprecipitate      INFECTIOUS DISEASES  WBC Count: 11.8 K/uL (09-18 @ 01:59)  WBC Count: 13.7 K/uL (09-17 @ 16:50)  WBC Count: 12.0 K/uL (09-17 @ 09:02)    RECENT CULTURES:    Meds:       ENDOCRINE  CAPILLARY BLOOD GLUCOSE        Meds: levothyroxine 50 MICROGram(s) Oral daily        ACCESS DEVICES:  [ ] Peripheral IV  [ ] Central Venous Line	[ ] R	[ ] L	[ ] IJ	[ ] Fem	[ ] SC	Placed:   [ ] Arterial Line		[ ] R	[ ] L	[ ] Fem	[ ] Rad	[ ] Ax	Placed:   [ ] PICC:					[ ] Mediport  [ ] Urinary Catheter, Date Placed:   [x] Necessity of urinary, arterial, and venous catheters discussed    OTHER MEDICATIONS:  latanoprost 0.005% Ophthalmic Solution 1 Drop(s) Both EYES at bedtime  lidocaine   Patch 1 Patch Transdermal every 24 hours      CODE STATUS:      IMAGING:

## 2017-09-18 NOTE — PROGRESS NOTE ADULT - SUBJECTIVE AND OBJECTIVE BOX
TRAUMA SURGERY PROGRESS NOTE:     Overnight Events:  No melantoic bowel movements overnight. Patient denies pain. Somewhat confused but at baseline.    Physical Exam  Vital Signs Last 24 Hrs  T(C): 37.3 (18 Sep 2017 07:00), Max: 37.7 (17 Sep 2017 23:00)  T(F): 99.2 (18 Sep 2017 07:00), Max: 99.8 (17 Sep 2017 23:00)  HR: 68 (18 Sep 2017 09:00) (57 - 77)  BP: 178/75 (18 Sep 2017 09:00) (140/60 - 186/74)  BP(mean): 108 (18 Sep 2017 09:00) (80 - 123)  RR: 26 (18 Sep 2017 09:00) (19 - 36)  SpO2: 98% (18 Sep 2017 09:00) (94% - 100%)    Gen: NAD  Abd: Soft, ND, NT    I&O's Detail    17 Sep 2017 07:01  -  18 Sep 2017 07:00  --------------------------------------------------------  IN:    dextrose 5% + sodium chloride 0.45%.: 1350 mL    IV PiggyBack: 150 mL    Oral Fluid: 300 mL    pantoprazole Infusion: 200 mL  Total IN: 2000 mL    OUT:    Incontinent per Condom Catheter: 1500 mL  Total OUT: 1500 mL    Total NET: 500 mL      Labs:                        9.5    11.8  )-----------( 207      ( 18 Sep 2017 01:59 )             27.5     09-18    148<H>  |  115<H>  |  22  ----------------------------<  110<H>  4.3   |  21<L>  |  1.00    Ca    8.8      18 Sep 2017 01:59  Phos  2.6     09-18  Mg     2.3     09-18    TPro  5.2<L>  /  Alb  2.9<L>  /  TBili  0.4  /  DBili  x   /  AST  34  /  ALT  35  /  AlkPhos  80  09-18    PT/INR - ( 18 Sep 2017 01:59 )   PT: 12.6 sec;   INR: 1.16 ratio         PTT - ( 18 Sep 2017 01:59 )  PTT:24.9 sec    ABG - ( 17 Sep 2017 04:31 )  pH: 7.43  /  pCO2: 38    /  pO2: 97    / HCO3: 25    / Base Excess: 1.1   /  SaO2: 98

## 2017-09-18 NOTE — PROGRESS NOTE ADULT - SUBJECTIVE AND OBJECTIVE BOX
Follow-up Pulm Progress Note  Stefan Vides MD  [phone]    Events Noted: tx to sicu for acute decrease in Hb to 6.1 due to UGIB  S/O EGD: congestive gastropathy; multiple non-bleeding duodenal ulcers  Hemodynamically stable post transfusion  Breathing comfortably supine  Lethargic, rousable, remains deliruous  CXR: Hazy R base - unclear if effusion or artifact    Vital Signs Last 24 Hrs  T(C): 38.1 (18 Sep 2017 11:00), Max: 38.1 (18 Sep 2017 11:00)  T(F): 100.6 (18 Sep 2017 11:00), Max: 100.6 (18 Sep 2017 11:00)  HR: 72 (18 Sep 2017 12:00) (57 - 77)  BP: 146/65 (18 Sep 2017 11:00) (140/60 - 186/74)  BP(mean): 94 (18 Sep 2017 11:00) (80 - 123)  RR: 33 (18 Sep 2017 12:00) (19 - 35)  SpO2: 98% (18 Sep 2017 12:00) (94% - 100%)    ABG - ( 17 Sep 2017 04:31 )  pH: 7.43  /  pCO2: 38    /  pO2: 97    / HCO3: 25    / Base Excess: 1.1   /  SaO2: 98                              9.5    11.8  )-----------( 207      ( 18 Sep 2017 01:59 )             27.5       09-18    148<H>  |  115<H>  |  22  ----------------------------<  110<H>  4.3   |  21<L>  |  1.00    Ca    8.8      18 Sep 2017 01:59  Phos  2.6     09-18  Mg     2.3     09-18    TPro  5.2<L>  /  Alb  2.9<L>  /  TBili  0.4  /  DBili  x   /  AST  34  /  ALT  35  /  AlkPhos  80  09-18      Physical Examination:  PULM: Clear without wheeze or rhonchi  CVS: Regular rate and rhythm, no murmurs, rubs, or gallops  ABD: Soft, non-tender  EXT:  No clubbing, cyanosis, or edema    RADIOLOGY REVIEWED  CXR: HAZEL    CT chest:    TTE:

## 2017-09-18 NOTE — PROGRESS NOTE ADULT - ASSESSMENT
UGIB with anemia - hemodynamically stable post Tx RBC  Postraumatic (fall) multiple R rib fractures and large R lpneumothorax   Respansion R lung post R chest tube insertion  Dementia with agitation and lethargy:  REC    Monitor CBC and blood pressure per SICU team  Nasal oxygen for sat >= 90%  Mobilize with PT as tolerated  DVT prophylaxis  GI f/u

## 2017-09-18 NOTE — PROGRESS NOTE ADULT - SUBJECTIVE AND OBJECTIVE BOX
Chief Complaint:  Patient is a 88y old  Male who presents with a chief complaint of unwitnessed fall, multiple rib fractures with ptx on xray (08 Sep 2017 11:31)      Interval Events:  No adverse events overnight.  No further BMs or overt bleeding. Hgb stable.  Patient is much more awake todaya but extremely delirious; thinks he is at a restaurant and is asking for a glass of red wine.     Allergies:  No Known Allergies      Hospital Medications:  donepezil 10 milliGRAM(s) Oral at bedtime  latanoprost 0.005% Ophthalmic Solution 1 Drop(s) Both EYES at bedtime  levothyroxine 50 MICROGram(s) Oral daily  lidocaine   Patch 1 Patch Transdermal every 24 hours  hydrALAZINE Injectable 10 milliGRAM(s) IV Push every 4 hours PRN  tamsulosin 0.4 milliGRAM(s) Oral at bedtime  acetaminophen   Tablet. 325 milliGRAM(s) Oral every 4 hours PRN  ALBUTerol/ipratropium for Nebulization 3 milliLiter(s) Nebulizer three times a day  thiamine 100 milliGRAM(s) Oral daily  pantoprazole Infusion 8 mG/Hr IV Continuous <Continuous>  memantine 10 milliGRAM(s) Oral every 12 hours  citalopram 10 milliGRAM(s) Oral at bedtime  enoxaparin Injectable 40 milliGRAM(s) SubCutaneous every 24 hours      PMHX/PSHX:  HTN (hypertension)  Skin cancer  Parotid neoplasm  Carotid artery disease  Dementia  Glaucoma  Hypothyroid  CAD (coronary artery disease)  Artificial cardiac pacemaker  H/O carotid endarterectomy  S/P hernia repair  S/P knee replacement  H/O shoulder surgery  Stented coronary artery      Family history:  Family history of heart disease (Father)  No pertinent family history in first degree relatives      ROS:  unable to obtain 2/2 MS    PHYSICAL EXAM:     GENERAL:  NAD  HEENT:  NC/AT  CHEST:  Full & symmetric excursion  ABDOMEN:  Soft, non-tender, non-distended, normoactive bowel sounds  EXTREMITIES:  no cyanosis,clubbing or edema  NEURO:  Alert, not oriented     Vital Signs:  Vital Signs Last 24 Hrs  T(C): 37.5 (18 Sep 2017 12:00), Max: 38.1 (18 Sep 2017 11:00)  T(F): 99.5 (18 Sep 2017 12:00), Max: 100.6 (18 Sep 2017 11:00)  HR: 73 (18 Sep 2017 13:00) (57 - 77)  BP: 129/59 (18 Sep 2017 13:00) (129/59 - 186/74)  BP(mean): 85 (18 Sep 2017 13:00) (80 - 123)  RR: 26 (18 Sep 2017 13:00) (19 - 35)  SpO2: 100% (18 Sep 2017 13:00) (94% - 100%)  Daily     Daily Weight in k.1 (18 Sep 2017 00:16)    LABS:                        9.5    11.8  )-----------( 207      ( 18 Sep 2017 01:59 )             27.5     09-18    148<H>  |  115<H>  |  22  ----------------------------<  110<H>  4.3   |  21<L>  |  1.00    Ca    8.8      18 Sep 2017 01:59  Phos  2.6     -18  Mg     2.3     -18    TPro  5.2<L>  /  Alb  2.9<L>  /  TBili  0.4  /  DBili  x   /  AST  34  /  ALT  35  /  AlkPhos  80  09-18    LIVER FUNCTIONS - ( 18 Sep 2017 01:59 )  Alb: 2.9 g/dL / Pro: 5.2 g/dL / ALK PHOS: 80 U/L / ALT: 35 U/L RC / AST: 34 U/L / GGT: x           PT/INR - ( 18 Sep 2017 01:59 )   PT: 12.6 sec;   INR: 1.16 ratio         PTT - ( 18 Sep 2017 01:59 )  PTT:24.9 sec        Imaging:  no new imaging

## 2017-09-18 NOTE — PROGRESS NOTE ADULT - ASSESSMENT
Dementia with delirium  Given his need for wrist restraints, he should receive a neuroleptic. He had taken Seroquel before coming to SICU but became sedated on 25mg. However, the sedation could have been from anemia/GI bleed.  Discussed with daughter the risks and benefits of neuroleptics.     Recommend  If family agrees and if QTc is under 500ms, start Haldol 1mg IV qhs and 1mg IV q8h prn agitation.     Stefan Santillan M.D.  Psychiatry  [phone]

## 2017-09-18 NOTE — PROGRESS NOTE ADULT - ASSESSMENT
Impression: 87yo M with history of CAD and recent mechanical fall with rib fx and subsequent PNA who developed acute symptomatic GI bleed, found to have large DUs.     Problem List:  1) multiple DUs, one with flat pigmented spot and one with visible non bleeding vessel, s/p clip  2) Acute blood loss anemia, 2/2 above  3) Delirium on Dementia  4) h/o CAD, not on antiplatelet therapy    Plan:  - no signs of further bleeding  - regular diet as tolerated   - continue PPI ggt x 72 hours, then transition to oral PPI once daily  - followup pathology report  - continue to monitor CBC and transfuse Hgb if < 7  - if shows signs of recurrent bleeding (drop of blood pressure, Hb drop), will repeat EGD

## 2017-09-18 NOTE — PROVIDER CONTACT NOTE (OTHER) - RECOMMENDATIONS
Draw stat CBC
MD aware, Urology to assess and possible place larger bore brunner catheter.
Notify [facility]
observe for now,  they want to hold on a nebulizer, and will call urologic come to see pt.
come to see patient, better sedative/antianxiety med options?

## 2017-09-18 NOTE — PROGRESS NOTE ADULT - ASSESSMENT
89 y/o male s/p fall with right 4th-8th rib fractures and resolved right pneumothorax now presenting with an acute GI bleed.    Neuro:  - IV acetaminophen as needed for pain.  - Monitor neuro status every 4 hours.    Resp:  - Monitor pulse oximeter.  - Oxygen as needed via nasal cannula for goal SpO2 greater than 92%.    CV:  - Monitor vital signs.  - Will hold all home anti-hypertensives.    GI:  -s/p EGD with GI  -bedside speech and swallow this am   - Protonix gtt.    Renal:  - Monitor I&Os.  - Monitor electrolytes and replete as necessary.  - D5 .45%NS since currently NPO    Heme:  - SCDs for mechanical VTE prophylaxis as patient is actively bleeding.  - Trend CBC, slow drop this am, transfusion likely   - Trend coags.    ID:  - Monitor for clinical evidence of active infection.    Endo:  - Monitor blood glucose on BMP.  - Monitor blood glucose on BMP.

## 2017-09-18 NOTE — PROGRESS NOTE ADULT - SUBJECTIVE AND OBJECTIVE BOX
Events noted. Pt in SICU in wrist restraints for agitation/pulling out lines. He is here with GI bleed that is now stable per SICU staff.       Vital Signs Last 24 Hrs  T(C): 37.5 (18 Sep 2017 12:00), Max: 38.1 (18 Sep 2017 11:00)  T(F): 99.5 (18 Sep 2017 12:00), Max: 100.6 (18 Sep 2017 11:00)  HR: 73 (18 Sep 2017 13:00) (57 - 77)  BP: 129/59 (18 Sep 2017 13:00) (129/59 - 186/74)  BP(mean): 85 (18 Sep 2017 13:00) (80 - 123)  RR: 26 (18 Sep 2017 13:00) (19 - 33)  SpO2: 100% (18 Sep 2017 13:00) (94% - 100%)                          9.5    11.8  )-----------( 207      ( 18 Sep 2017 01:59 )             27.5       09-18    148<H>  |  115<H>  |  22  ----------------------------<  110<H>  4.3   |  21<L>  |  1.00    Ca    8.8      18 Sep 2017 01:59  Phos  2.6     09-18  Mg     2.3     09-18    TPro  5.2<L>  /  Alb  2.9<L>  /  TBili  0.4  /  DBili  x   /  AST  34  /  ALT  35  /  AlkPhos  80  09-18              MEDICATIONS  (STANDING):  donepezil 10 milliGRAM(s) Oral at bedtime  latanoprost 0.005% Ophthalmic Solution 1 Drop(s) Both EYES at bedtime  levothyroxine 50 MICROGram(s) Oral daily  lidocaine   Patch 1 Patch Transdermal every 24 hours  tamsulosin 0.4 milliGRAM(s) Oral at bedtime  ALBUTerol/ipratropium for Nebulization 3 milliLiter(s) Nebulizer three times a day  thiamine 100 milliGRAM(s) Oral daily  pantoprazole Infusion 8 mG/Hr (10 mL/Hr) IV Continuous <Continuous>  memantine 10 milliGRAM(s) Oral every 12 hours  citalopram 10 milliGRAM(s) Oral at bedtime  enoxaparin Injectable 40 milliGRAM(s) SubCutaneous every 24 hours    MEDICATIONS  (PRN):  hydrALAZINE Injectable 10 milliGRAM(s) IV Push every 4 hours PRN Systolic greater than 180  acetaminophen   Tablet. 325 milliGRAM(s) Oral every 4 hours PRN Mild Pain (1 - 3)      Elderly WM in bed, restless, irritable, uncooperative, alert and oriented x 0  .  Insight and judgment are poor. He will not answer most questions. He denies pain.  Poor Attention and concentration, short term memory, and long term memory.

## 2017-09-18 NOTE — PROGRESS NOTE ADULT - SUBJECTIVE AND OBJECTIVE BOX
Patient is a 88y old  Male who presents with a chief complaint of unwitnessed fall, multiple rib fractures with ptx on xray (08 Sep 2017 11:31)      SUBJECTIVE / OVERNIGHT EVENTS:    patient seen and examined at bedside  extremely delirious, when asked how he feels, he states "I feel like you owe me a scotch"  no acute events overnight  no further bloody bowel movements    Vital Signs Last 24 Hrs  T(C): 37.1 (18 Sep 2017 19:00), Max: 38.1 (18 Sep 2017 11:00)  T(F): 98.7 (18 Sep 2017 19:00), Max: 100.6 (18 Sep 2017 11:00)  HR: 73 (18 Sep 2017 20:00) (57 - 78)  BP: 167/141 (18 Sep 2017 20:00) (126/60 - 196/91)  BP(mean): 152 (18 Sep 2017 20:00) (85 - 152)  RR: 29 (18 Sep 2017 20:00) (19 - 33)  SpO2: 98% (18 Sep 2017 20:00) (96% - 100%)  I&O's Summary    17 Sep 2017 07:01  -  18 Sep 2017 07:00  --------------------------------------------------------  IN: 2000 mL / OUT: 1550 mL / NET: 450 mL    18 Sep 2017 07:01  -  18 Sep 2017 20:55  --------------------------------------------------------  IN: 515 mL / OUT: 560 mL / NET: -45 mL        GENERAL: NAD, AAOx1, delirious  HEAD:  Atraumatic, Normocephalic  EYES: EOMI, PERRLA, conjunctiva and sclera clear  NECK: Supple, No JVD, No LAD  CHEST/LUNG: Clear to auscultation bilaterally; No wheeze  HEART: Regular rate and rhythm; No murmurs, rubs, or gallops  ABDOMEN: Soft, Nontender, Nondistended; Bowel sounds present  EXTREMITIES:  2+ Peripheral Pulses, No clubbing, cyanosis, or edema  SKIN: No rashes or lesions      LABS:                        9.8    10.2  )-----------( 211      ( 18 Sep 2017 20:04 )             27.2     09-18    148<H>  |  115<H>  |  22  ----------------------------<  110<H>  4.3   |  21<L>  |  1.00    Ca    8.8      18 Sep 2017 01:59  Phos  2.6     09-18  Mg     2.3     09-18    TPro  5.2<L>  /  Alb  2.9<L>  /  TBili  0.4  /  DBili  x   /  AST  34  /  ALT  35  /  AlkPhos  80  09-18    PT/INR - ( 18 Sep 2017 01:59 )   PT: 12.6 sec;   INR: 1.16 ratio         PTT - ( 18 Sep 2017 01:59 )  PTT:24.9 sec  CAPILLARY BLOOD GLUCOSE                RADIOLOGY & ADDITIONAL TESTS:    Imaging Personally Reviewed:  [x] YES  [ ] NO    Consultant(s) Notes Reviewed:  [x] YES  [ ] NO      MEDICATIONS  (STANDING):  donepezil 10 milliGRAM(s) Oral at bedtime  latanoprost 0.005% Ophthalmic Solution 1 Drop(s) Both EYES at bedtime  levothyroxine 50 MICROGram(s) Oral daily  lidocaine   Patch 1 Patch Transdermal every 24 hours  tamsulosin 0.4 milliGRAM(s) Oral at bedtime  ALBUTerol/ipratropium for Nebulization 3 milliLiter(s) Nebulizer three times a day  thiamine 100 milliGRAM(s) Oral daily  pantoprazole Infusion 8 mG/Hr (10 mL/Hr) IV Continuous <Continuous>  memantine 10 milliGRAM(s) Oral every 12 hours  citalopram 10 milliGRAM(s) Oral at bedtime  enoxaparin Injectable 40 milliGRAM(s) SubCutaneous every 24 hours  haloperidol    Injectable 1 milliGRAM(s) IV Push at bedtime    MEDICATIONS  (PRN):  hydrALAZINE Injectable 10 milliGRAM(s) IV Push every 4 hours PRN Systolic greater than 180  acetaminophen   Tablet. 325 milliGRAM(s) Oral every 4 hours PRN Mild Pain (1 - 3)  haloperidol    Injectable 1 milliGRAM(s) IV Push every 8 hours PRN Delerium      Care Discussed with Consultants/Other Providers [x] YES  [ ] NO    HEALTH ISSUES - PROBLEM Dx:  Hematuria: Hematuria

## 2017-09-18 NOTE — PROVIDER CONTACT NOTE (OTHER) - ACTION/TREATMENT ORDERED:
MD and PA at bedside, urology called by team to bedside. brunner irrigated and clots removed, as per urology PA, okay for RN staff to irrigate brunner to withdraw clots, not to flush. pt calmer post.
MD aware, awaiting Urology.
Spoke with MD Gabe. Stated to wait for next round of VS and notify if no change or increase in BP. Will monitor pt closely.
attempted to irrigate brunner but met resistance.  will continue to monitor.
same as above

## 2017-09-18 NOTE — PROGRESS NOTE ADULT - ASSESSMENT
89 yo male s/p fall with complicated hospital stay with GI bleed.  -Neuro: delirium seroquel  and citalopram added, will continue to monitor. Patient on one to one.   -Resp: stable  -CV: stable  -GI: Dysphagia diet, continue on PPI gtt for 2 more days  -: recommend d/c brunner,   -Heme: stable continue to monitor  -ID: no intervention  Dispo: patient is stable for transfer to floor, patient will either need rehab or will be discharged home with PT

## 2017-09-19 LAB
ANION GAP SERPL CALC-SCNC: 15 MMOL/L — SIGNIFICANT CHANGE UP (ref 5–17)
APTT BLD: 28.5 SEC — SIGNIFICANT CHANGE UP (ref 27.5–37.4)
BUN SERPL-MCNC: 13 MG/DL — SIGNIFICANT CHANGE UP (ref 7–23)
CALCIUM SERPL-MCNC: 9.1 MG/DL — SIGNIFICANT CHANGE UP (ref 8.4–10.5)
CHLORIDE SERPL-SCNC: 107 MMOL/L — SIGNIFICANT CHANGE UP (ref 96–108)
CO2 SERPL-SCNC: 22 MMOL/L — SIGNIFICANT CHANGE UP (ref 22–31)
CREAT SERPL-MCNC: 0.97 MG/DL — SIGNIFICANT CHANGE UP (ref 0.5–1.3)
GLUCOSE SERPL-MCNC: 100 MG/DL — HIGH (ref 70–99)
HCT VFR BLD CALC: 29.2 % — LOW (ref 39–50)
HGB BLD-MCNC: 10.2 G/DL — LOW (ref 13–17)
INR BLD: 1.25 RATIO — HIGH (ref 0.88–1.16)
MAGNESIUM SERPL-MCNC: 2 MG/DL — SIGNIFICANT CHANGE UP (ref 1.6–2.6)
MCHC RBC-ENTMCNC: 33.2 PG — SIGNIFICANT CHANGE UP (ref 27–34)
MCHC RBC-ENTMCNC: 35 GM/DL — SIGNIFICANT CHANGE UP (ref 32–36)
MCV RBC AUTO: 94.7 FL — SIGNIFICANT CHANGE UP (ref 80–100)
PHOSPHATE SERPL-MCNC: 2.2 MG/DL — LOW (ref 2.5–4.5)
PLATELET # BLD AUTO: 207 K/UL — SIGNIFICANT CHANGE UP (ref 150–400)
POTASSIUM SERPL-MCNC: 3.9 MMOL/L — SIGNIFICANT CHANGE UP (ref 3.5–5.3)
POTASSIUM SERPL-SCNC: 3.9 MMOL/L — SIGNIFICANT CHANGE UP (ref 3.5–5.3)
PROTHROM AB SERPL-ACNC: 13.7 SEC — HIGH (ref 9.8–12.7)
RBC # BLD: 3.08 M/UL — LOW (ref 4.2–5.8)
RBC # FLD: 14.6 % — HIGH (ref 10.3–14.5)
SODIUM SERPL-SCNC: 144 MMOL/L — SIGNIFICANT CHANGE UP (ref 135–145)
WBC # BLD: 11.5 K/UL — HIGH (ref 3.8–10.5)
WBC # FLD AUTO: 11.5 K/UL — HIGH (ref 3.8–10.5)

## 2017-09-19 PROCEDURE — 99232 SBSQ HOSP IP/OBS MODERATE 35: CPT | Mod: GC

## 2017-09-19 PROCEDURE — 99232 SBSQ HOSP IP/OBS MODERATE 35: CPT

## 2017-09-19 RX ORDER — SODIUM,POTASSIUM PHOSPHATES 278-250MG
1 POWDER IN PACKET (EA) ORAL
Qty: 0 | Refills: 0 | Status: COMPLETED | OUTPATIENT
Start: 2017-09-19 | End: 2017-09-19

## 2017-09-19 RX ORDER — LABETALOL HCL 100 MG
10 TABLET ORAL ONCE
Qty: 0 | Refills: 0 | Status: COMPLETED | OUTPATIENT
Start: 2017-09-19 | End: 2017-09-19

## 2017-09-19 RX ORDER — HALOPERIDOL DECANOATE 100 MG/ML
2 INJECTION INTRAMUSCULAR AT BEDTIME
Qty: 0 | Refills: 0 | Status: DISCONTINUED | OUTPATIENT
Start: 2017-09-19 | End: 2017-09-20

## 2017-09-19 RX ORDER — METOPROLOL TARTRATE 50 MG
25 TABLET ORAL
Qty: 0 | Refills: 0 | Status: DISCONTINUED | OUTPATIENT
Start: 2017-09-19 | End: 2017-09-20

## 2017-09-19 RX ORDER — PANTOPRAZOLE SODIUM 20 MG/1
40 TABLET, DELAYED RELEASE ORAL
Qty: 0 | Refills: 0 | Status: DISCONTINUED | OUTPATIENT
Start: 2017-09-19 | End: 2017-09-20

## 2017-09-19 RX ADMIN — ENOXAPARIN SODIUM 40 MILLIGRAM(S): 100 INJECTION SUBCUTANEOUS at 15:07

## 2017-09-19 RX ADMIN — Medication 3 MILLILITER(S): at 05:15

## 2017-09-19 RX ADMIN — Medication 50 MICROGRAM(S): at 05:26

## 2017-09-19 RX ADMIN — SODIUM CHLORIDE 50 MILLILITER(S): 9 INJECTION, SOLUTION INTRAVENOUS at 09:51

## 2017-09-19 RX ADMIN — Medication 10 MILLIGRAM(S): at 07:34

## 2017-09-19 RX ADMIN — Medication 1 TABLET(S): at 06:29

## 2017-09-19 RX ADMIN — SODIUM CHLORIDE 50 MILLILITER(S): 9 INJECTION, SOLUTION INTRAVENOUS at 07:34

## 2017-09-19 RX ADMIN — Medication 10 MILLIGRAM(S): at 16:10

## 2017-09-19 RX ADMIN — Medication 10 MILLIGRAM(S): at 00:16

## 2017-09-19 RX ADMIN — HALOPERIDOL DECANOATE 1 MILLIGRAM(S): 100 INJECTION INTRAMUSCULAR at 07:33

## 2017-09-19 RX ADMIN — Medication 100 MILLIGRAM(S): at 15:08

## 2017-09-19 RX ADMIN — LIDOCAINE 1 PATCH: 4 CREAM TOPICAL at 15:07

## 2017-09-19 RX ADMIN — Medication 3 MILLILITER(S): at 22:14

## 2017-09-19 RX ADMIN — Medication 1 TABLET(S): at 09:50

## 2017-09-19 RX ADMIN — Medication 25 MILLIGRAM(S): at 00:49

## 2017-09-19 RX ADMIN — MEMANTINE HYDROCHLORIDE 10 MILLIGRAM(S): 10 TABLET ORAL at 05:26

## 2017-09-19 NOTE — PROGRESS NOTE ADULT - ASSESSMENT
Delirium  Pt had been oversedated with Seroquel so we should try to avoid sedating agents (e.g. Zyprexa) unless he is extremely agitated.    Recommend  Increase Haldol to 2mg IV qhs and follow QTc.  Haldol 1mg IV q8h prn agitation  If he is transferred out of the ICU, one to one observation for safety.  Discussed with SICU MDs.    Stefan Santillan M.D.  Psychiatry  [phone]

## 2017-09-19 NOTE — PROGRESS NOTE ADULT - ASSESSMENT
UGIB with anemia - hemodynamically stable post Tx RBC  Postraumatic (fall) multiple R rib fractures and large R lpneumothorax   Respansion R lung post R chest tube insertion  Dementia with agitation and lethargy:  REC    Monitor CBC and blood pressure per SICU team  Repeat CXR - upright  Nasal oxygen for sat >= 90%  Mobilize with PT as tolerated  DVT prophylaxis  GI f/u

## 2017-09-19 NOTE — PROGRESS NOTE ADULT - ASSESSMENT
Impression: 87yo M with history of CAD and recent mechanical fall with rib fx and subsequent PNA who developed acute symptomatic GI bleed, found to have large DUs.     Problem List:  1) Multiple Duodenal Ulcers, one with flat pigmented spot and one with visible non bleeding vessel, s/p clip  2) Acute blood loss anemia, 2/2 duodenal ulcer  3) Delirium on Dementia  4) h/o CAD, not on antiplatelet therapy    Plan:  - continue to monitor CBC and transfuse Hgb if < 7, no signs of further bleeding  - continue PPI ggt x 72 hours, then transition to oral PPI once daily  - followup pathology report  - dysphagia 3 diet as per speech and swallow   - if shows signs of recurrent bleeding (drop of blood pressure, Hb drop), will repeat EGD  - supportive care as per primary team    Thank you for the consult.   GI team will continue to follow. Impression: 87yo M with history of CAD and recent mechanical fall with rib fx and subsequent PNA who developed acute symptomatic GI bleed, found to have large DUs.     Problem List:  1) Multiple Duodenal Ulcers, one with flat pigmented spot and one with visible non bleeding vessel, s/p clip  2) Acute blood loss anemia, 2/2 duodenal ulcer  3) Delirium on Dementia, psych following, pt on haldol   4) h/o CAD, not on antiplatelet therapy    Plan:  - continue to monitor CBC and transfuse Hgb if < 7, no signs of further bleeding  - complete PPI ggt x 72 hours, ok to switch to PPI IV daily from today   - follow up pathology report  - dysphagia 3 diet as per speech and swallow   - if shows signs of recurrent bleeding (drop of blood pressure, Hb drop), will repeat EGD  - supportive care as per primary team    Thank you for the consult.   GI team will continue to follow.

## 2017-09-19 NOTE — PROGRESS NOTE ADULT - ASSESSMENT
89 y/o M w/ hx of HTN HLD CAD s/p PPM, dementia aaox 1-2 at baseline, glaucoma, hypothyroidism and parotid neoplasm who presented s/p fall in the bathtub found to have  R rib 5-8 fractures and pneumothorax s/p chest tube and removal.   Pneumothorax resolved.        Acute Blood Loss Anemia - 2/2 bleeding duodenal ulcer, now s/p clipping and APC, H/h now stable, GI appreciated, c/w SICU level of care    Duodenal Ulcer - s/p clipping and APC, avoid anticoagulation for now    Rib Fx - Pain controlled     Bradycardia -resolved hr stable off bb currently     Bladder wall thickening / Cystitis - finished course of abx    HTN HLD CAD s/p PPM - avoid aggressive control in elderly monitor c/w telemetry     Dementia /Delerium - Psy consult appreciated, will try low dose haldol    DVT PPX 89 y/o M w/ hx of HTN HLD CAD s/p PPM, dementia aaox 1-2 at baseline, glaucoma, hypothyroidism and parotid neoplasm who presented s/p fall in the bathtub found to have  R rib 5-8 fractures and pneumothorax s/p chest tube and removal.   Pneumothorax resolved.        Acute Blood Loss Anemia - 2/2 bleeding duodenal ulcer, now s/p clipping and APC, H/h now stable, GI appreciated, ?downgarde    Duodenal Ulcer - s/p clipping and APC, back on dvt ppx    Rib Fx - Pain controlled     Bradycardia -resolved hr stable off bb currently     Bladder wall thickening / Cystitis - finished course of abx    HTN HLD CAD s/p PPM - avoid aggressive control in elderly monitor c/w telemetry     Dementia /Delerium - Psy consult appreciated, will try low dose haldol    DVT PPX

## 2017-09-19 NOTE — PROGRESS NOTE ADULT - ASSESSMENT
87 yo male s/p fall with complicated hospital stay with GI bleed, transferred to floor after stablized in SICU. Hemodynamically stable.     Plan:  -Neuro: holding seroquel, haldol at bedtime and PRN. Pain control. 1:1 observation  -Resp: stable, monitor pulse ox  -CV: HTN, continue beta-blocker and blood pressure control  -GI: Dysphagia 3 diet, continue on PPI gtt for 1 more day  -: condom catheter, monitor Is and Os  -Heme: DVT ppx  Dispo: discharge home with home care if family desires

## 2017-09-19 NOTE — PROGRESS NOTE ADULT - SUBJECTIVE AND OBJECTIVE BOX
GENERAL SURGERY FLOOR TRANSFER NOTE    88y Male transferred to floor from SICU    SUBJECTIVE: patient is arousable but not following command, no acute issues per sitter    OBJECTIVE:  T(C): 36.4 (09-19-17 @ 17:28), Max: 37.1 (09-18-17 @ 19:00)  HR: 64 (09-19-17 @ 17:28) (57 - 92)  BP: 114/81 (09-19-17 @ 17:28) (113/58 - 215/154)  RR: 22 (09-19-17 @ 17:28) (20 - 44)  SpO2: 97% (09-19-17 @ 17:28) (88% - 100%)  Wt(kg): --      PHYSICAL EXAM:  Gen: patient is asleep, arousable but not following command  Pulm: breathing comfortably no respiratory distress  Abd: soft, ND, NT    I&O's Summary    18 Sep 2017 07:01  -  19 Sep 2017 07:00  --------------------------------------------------------  IN: 985 mL / OUT: 1360 mL / NET: -375 mL    19 Sep 2017 07:01  -  19 Sep 2017 18:21  --------------------------------------------------------  IN: 400 mL / OUT: 0 mL / NET: 400 mL                              10.2   11.5  )-----------( 207      ( 19 Sep 2017 03:13 )             29.2       09-19    144  |  107  |  13  ----------------------------<  100<H>  3.9   |  22  |  0.97    Ca    9.1      19 Sep 2017 03:13  Phos  2.2     09-19  Mg     2.0     09-19    TPro  5.2<L>  /  Alb  2.9<L>  /  TBili  0.4  /  DBili  x   /  AST  34  /  ALT  35  /  AlkPhos  80  09-18    MEDICATIONS  (STANDING):  donepezil 10 milliGRAM(s) Oral at bedtime  latanoprost 0.005% Ophthalmic Solution 1 Drop(s) Both EYES at bedtime  levothyroxine 50 MICROGram(s) Oral daily  lidocaine   Patch 1 Patch Transdermal every 24 hours  tamsulosin 0.4 milliGRAM(s) Oral at bedtime  ALBUTerol/ipratropium for Nebulization 3 milliLiter(s) Nebulizer three times a day  thiamine 100 milliGRAM(s) Oral daily  memantine 10 milliGRAM(s) Oral every 12 hours  citalopram 10 milliGRAM(s) Oral at bedtime  enoxaparin Injectable 40 milliGRAM(s) SubCutaneous every 24 hours  dextrose 5%. 1000 milliLiter(s) (50 mL/Hr) IV Continuous <Continuous>  metoprolol 25 milliGRAM(s) Oral two times a day  haloperidol    Injectable 2 milliGRAM(s) IV Push at bedtime  pantoprazole   Suspension 40 milliGRAM(s) Oral two times a day before meals    MEDICATIONS  (PRN):  hydrALAZINE Injectable 10 milliGRAM(s) IV Push every 4 hours PRN Systolic greater than 180  acetaminophen   Tablet. 325 milliGRAM(s) Oral every 4 hours PRN Mild Pain (1 - 3)  haloperidol    Injectable 1 milliGRAM(s) IV Push every 8 hours PRN Delerium

## 2017-09-19 NOTE — PROGRESS NOTE ADULT - SUBJECTIVE AND OBJECTIVE BOX
Patient is a 88y old  Male who presents with a chief complaint of unwitnessed fall, multiple rib fractures with ptx on xray (08 Sep 2017 11:31)      SUBJECTIVE / OVERNIGHT EVENTS:        Vital Signs Last 24 Hrs  T(C): 37.1 (19 Sep 2017 15:00), Max: 37.1 (18 Sep 2017 19:00)  T(F): 98.8 (19 Sep 2017 15:00), Max: 98.8 (19 Sep 2017 15:00)  HR: 57 (19 Sep 2017 15:00) (57 - 92)  BP: 119/56 (19 Sep 2017 15:00) (113/58 - 203/125)  BP(mean): 81 (19 Sep 2017 15:00) (81 - 154)  RR: 31 (19 Sep 2017 15:00) (20 - 44)  SpO2: 99% (19 Sep 2017 15:00) (88% - 100%)  I&O's Summary    18 Sep 2017 07:01  -  19 Sep 2017 07:00  --------------------------------------------------------  IN: 985 mL / OUT: 1360 mL / NET: -375 mL    19 Sep 2017 07:01  -  19 Sep 2017 15:58  --------------------------------------------------------  IN: 400 mL / OUT: 0 mL / NET: 400 mL        GENERAL: NAD, AAOx3  HEAD:  Atraumatic, Normocephalic  EYES: EOMI, PERRLA, conjunctiva and sclera clear  NECK: Supple, No JVD, No LAD  CHEST/LUNG: Clear to auscultation bilaterally; No wheeze  HEART: Regular rate and rhythm; No murmurs, rubs, or gallops  ABDOMEN: Soft, Nontender, Nondistended; Bowel sounds present  EXTREMITIES:  2+ Peripheral Pulses, No clubbing, cyanosis, or edema  SKIN: No rashes or lesions      LABS:                        10.2   11.5  )-----------( 207      ( 19 Sep 2017 03:13 )             29.2     09-19    144  |  107  |  13  ----------------------------<  100<H>  3.9   |  22  |  0.97    Ca    9.1      19 Sep 2017 03:13  Phos  2.2     09-19  Mg     2.0     09-19    TPro  5.2<L>  /  Alb  2.9<L>  /  TBili  0.4  /  DBili  x   /  AST  34  /  ALT  35  /  AlkPhos  80  09-18    PT/INR - ( 19 Sep 2017 03:13 )   PT: 13.7 sec;   INR: 1.25 ratio         PTT - ( 19 Sep 2017 03:13 )  PTT:28.5 sec  CAPILLARY BLOOD GLUCOSE                RADIOLOGY & ADDITIONAL TESTS:    Imaging Personally Reviewed:  [x] YES  [ ] NO    Consultant(s) Notes Reviewed:  [x] YES  [ ] NO      MEDICATIONS  (STANDING):  donepezil 10 milliGRAM(s) Oral at bedtime  latanoprost 0.005% Ophthalmic Solution 1 Drop(s) Both EYES at bedtime  levothyroxine 50 MICROGram(s) Oral daily  lidocaine   Patch 1 Patch Transdermal every 24 hours  tamsulosin 0.4 milliGRAM(s) Oral at bedtime  ALBUTerol/ipratropium for Nebulization 3 milliLiter(s) Nebulizer three times a day  thiamine 100 milliGRAM(s) Oral daily  memantine 10 milliGRAM(s) Oral every 12 hours  citalopram 10 milliGRAM(s) Oral at bedtime  enoxaparin Injectable 40 milliGRAM(s) SubCutaneous every 24 hours  dextrose 5%. 1000 milliLiter(s) (50 mL/Hr) IV Continuous <Continuous>  metoprolol 25 milliGRAM(s) Oral two times a day  haloperidol    Injectable 2 milliGRAM(s) IV Push at bedtime  pantoprazole   Suspension 40 milliGRAM(s) Oral two times a day before meals    MEDICATIONS  (PRN):  hydrALAZINE Injectable 10 milliGRAM(s) IV Push every 4 hours PRN Systolic greater than 180  acetaminophen   Tablet. 325 milliGRAM(s) Oral every 4 hours PRN Mild Pain (1 - 3)  haloperidol    Injectable 1 milliGRAM(s) IV Push every 8 hours PRN Delerium      Care Discussed with Consultants/Other Providers [x] YES  [ ] NO    HEALTH ISSUES - PROBLEM Dx:  Hematuria: Hematuria Patient is a 88y old  Male who presents with a chief complaint of unwitnessed fall, multiple rib fractures with ptx on xray (08 Sep 2017 11:31)      SUBJECTIVE / OVERNIGHT EVENTS:    patient seen and examined at bedside  still delirious  no acute events overnight  no further bloody bowel movements    Vital Signs Last 24 Hrs  T(C): 37.1 (19 Sep 2017 15:00), Max: 37.1 (18 Sep 2017 19:00)  T(F): 98.8 (19 Sep 2017 15:00), Max: 98.8 (19 Sep 2017 15:00)  HR: 57 (19 Sep 2017 15:00) (57 - 92)  BP: 119/56 (19 Sep 2017 15:00) (113/58 - 203/125)  BP(mean): 81 (19 Sep 2017 15:00) (81 - 154)  RR: 31 (19 Sep 2017 15:00) (20 - 44)  SpO2: 99% (19 Sep 2017 15:00) (88% - 100%)  I&O's Summary    18 Sep 2017 07:01  -  19 Sep 2017 07:00  --------------------------------------------------------  IN: 985 mL / OUT: 1360 mL / NET: -375 mL    19 Sep 2017 07:01  -  19 Sep 2017 15:58  --------------------------------------------------------  IN: 400 mL / OUT: 0 mL / NET: 400 mL        GENERAL: NAD, AAOx1, delirious  HEAD:  Atraumatic, Normocephalic  EYES: EOMI, PERRLA, conjunctiva and sclera clear  NECK: Supple, No JVD, No LAD  CHEST/LUNG: Clear to auscultation bilaterally; No wheeze  HEART: Regular rate and rhythm; No murmurs, rubs, or gallops  ABDOMEN: Soft, Nontender, Nondistended; Bowel sounds present  EXTREMITIES:  2+ Peripheral Pulses, No clubbing, cyanosis, or edema  SKIN: No rashes or lesions      LABS:                        10.2   11.5  )-----------( 207      ( 19 Sep 2017 03:13 )             29.2     09-19    144  |  107  |  13  ----------------------------<  100<H>  3.9   |  22  |  0.97    Ca    9.1      19 Sep 2017 03:13  Phos  2.2     09-19  Mg     2.0     09-19    TPro  5.2<L>  /  Alb  2.9<L>  /  TBili  0.4  /  DBili  x   /  AST  34  /  ALT  35  /  AlkPhos  80  09-18    PT/INR - ( 19 Sep 2017 03:13 )   PT: 13.7 sec;   INR: 1.25 ratio         PTT - ( 19 Sep 2017 03:13 )  PTT:28.5 sec  CAPILLARY BLOOD GLUCOSE                RADIOLOGY & ADDITIONAL TESTS:    Imaging Personally Reviewed:  [x] YES  [ ] NO    Consultant(s) Notes Reviewed:  [x] YES  [ ] NO      MEDICATIONS  (STANDING):  donepezil 10 milliGRAM(s) Oral at bedtime  latanoprost 0.005% Ophthalmic Solution 1 Drop(s) Both EYES at bedtime  levothyroxine 50 MICROGram(s) Oral daily  lidocaine   Patch 1 Patch Transdermal every 24 hours  tamsulosin 0.4 milliGRAM(s) Oral at bedtime  ALBUTerol/ipratropium for Nebulization 3 milliLiter(s) Nebulizer three times a day  thiamine 100 milliGRAM(s) Oral daily  memantine 10 milliGRAM(s) Oral every 12 hours  citalopram 10 milliGRAM(s) Oral at bedtime  enoxaparin Injectable 40 milliGRAM(s) SubCutaneous every 24 hours  dextrose 5%. 1000 milliLiter(s) (50 mL/Hr) IV Continuous <Continuous>  metoprolol 25 milliGRAM(s) Oral two times a day  haloperidol    Injectable 2 milliGRAM(s) IV Push at bedtime  pantoprazole   Suspension 40 milliGRAM(s) Oral two times a day before meals    MEDICATIONS  (PRN):  hydrALAZINE Injectable 10 milliGRAM(s) IV Push every 4 hours PRN Systolic greater than 180  acetaminophen   Tablet. 325 milliGRAM(s) Oral every 4 hours PRN Mild Pain (1 - 3)  haloperidol    Injectable 1 milliGRAM(s) IV Push every 8 hours PRN Delerium      Care Discussed with Consultants/Other Providers [x] YES  [ ] NO    HEALTH ISSUES - PROBLEM Dx:  Hematuria: Hematuria

## 2017-09-19 NOTE — PROGRESS NOTE ADULT - SUBJECTIVE AND OBJECTIVE BOX
Follow-up Pulm Progress Note  Stefan Vides MD  [phone]    Events Noted:  Afebrile and hemodynamically stable: off antibiotics  Remains confused, intermittent agitation/deliruim  CXR: Hazy R base - unclear if effusion or artifact    Vital Signs Last 24 Hrs  T(C): 36.9 (19 Sep 2017 07:00), Max: 37.1 (18 Sep 2017 19:00)  T(F): 98.4 (19 Sep 2017 07:00), Max: 98.7 (18 Sep 2017 19:00)  HR: 60 (19 Sep 2017 14:00) (60 - 92)  BP: 139/63 (19 Sep 2017 14:00) (113/58 - 203/125)  BP(mean): 90 (19 Sep 2017 14:00) (82 - 154)  RR: 34 (19 Sep 2017 14:00) (20 - 44)  SpO2: 97% (19 Sep 2017 14:00) (88% - 100%)                          10.2   11.5  )-----------( 207      ( 19 Sep 2017 03:13 )             29.2     09-19    144  |  107  |  13  ----------------------------<  100<H>  3.9   |  22  |  0.97    Ca    9.1      19 Sep 2017 03:13  Phos  2.2     09-19  Mg     2.0     09-19    TPro  5.2<L>  /  Alb  2.9<L>  /  TBili  0.4  /  DBili  x   /  AST  34  /  ALT  35  /  AlkPhos  80  09-18      Physical Examination:  CONST: delirious, non-verbal  PULM: Clear without wheeze or rhonchi  CVS: Regular rate and rhythm, no murmurs, rubs, or gallops  ABD: Soft, non-tender  EXT:  No clubbing, cyanosis, or edema    RADIOLOGY REVIEWED  CXR:  CT chest:    TTE:

## 2017-09-19 NOTE — PROGRESS NOTE ADULT - ATTENDING COMMENTS
Pt. 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  Switch to PO PPI BIDx 4 wks, then once a day. Switch to PO PPI once daily. F/u biopsies for H. pylori.

## 2017-09-19 NOTE — PROGRESS NOTE ADULT - ASSESSMENT
89 y/o male s/p fall with right 4th-8th rib fractures and resolved right pneumothorax now presenting with an acute GI bleed.    Neuro:  - IV acetaminophen as needed for pain.  - Monitor neuro status every 4 hours.    Resp:  - Monitor pulse oximeter.  - Oxygen as needed via nasal cannula for goal SpO2 greater than 92%.    CV:  - Monitor vital signs.  - Continue metoprolol 25 mg PO q12 hrs for HTN.    GI:  - s/p EGD with GI  - Dysphagia 3 diet as tolerated.  - Protonix BID in the setting of bleeding duodenal ulcers    Renal:  - Monitor I&Os.  - Monitor electrolytes and replete as necessary.  - D5W at 50 mL/hr for hypernatremia. Consider transitioning to free water intake.    Heme:  - Lovenox for VTE prophylaxis  - Monitor CBCs and coags.    ID:  - Monitor for clinical evidence of active infection.    Endo:  - Monitor blood glucose on BMP.    Disposition:  - Full code  - Plan to transfer to floors vs. discharge home as HCT has been stable.    INOCENTE Palmer-C    [id number]

## 2017-09-19 NOTE — PROGRESS NOTE ADULT - SUBJECTIVE AND OBJECTIVE BOX
HISTORY  88y Male Patient is a 88 year old male with a PMHx of CAD s/p stent and PPM, and dementia who presented on 9/5/2017 s/p mechanical fall. Patient was initially admitted to the SICU for respiratory monitoring as the patient had sustained right 4th-8th rib fractures and a large right pneumothorax s/p pigtail catheter placement. He was stabilized from a respiratory standpoint and the right pigtail catheter was d/lowell with no evidence of pneumothorax afterwards. He was then transferred to the floors on 9/8/2017. Called to bedside today at approx. 1630 for a code blue. Patient was found minimally responsive and had a very large bowel movement with blood clots. Additionally, patient was hypotensive with a systolic BP in the 80s. Stat labs were drawn and patient was given 2 L of LR and 2 units of PRBCs. His systolic blood pressure improved to the 140s and patient started becoming more responsive. Patient was subsequently transferred to the SICU for further care. In the SICU, a right radial arterial line was placed and NGT placed & gastric lavage was performed, which was clear with no evidence of blood. GI was consulted and recommended making the patient NPO, starting a Protonix gtt, and administered bowel prep for an endoscopy & colonoscopy. Patient was scoped on 9/16/2017 and three duodenal ulcers were found. One ulcer had a visible vessel that was not actively bleeding and was subsequently clipped. Patient has since remained hemodynamically stable and HCT stable.      24 HOUR EVENTS: Patient's Seroquel was d/lowell per family's request as they felt that he was too sedated. He was started on haldol 1 mg IV at bedtime with 1 mg IV q8hr PRN delirium. Patient was also given a dose of olanzapine 5 mg sublingual with good effect as patient slept comfortably all night. Added D5W at 50 mL/hr for hypernatremia of 148. Required multiple pushes of hydralazine and labetalol for systolic BPs greater than 180 mmHg. Added metoprolol 25 mg PO q12hrs and systolic BP improved to 150 mmHg.      SUBJECTIVE/ROS:  [ ] A ten-point review of systems was otherwise negative except as noted.  [x] Due to altered mental status/intubation, subjective information were not able to be obtained from the patient. History was obtained, to the extent possible, from review of the chart and collateral sources of information.      NEURO  CAM ICU: positive  Exam: awake, alert, oriented x0  Meds:  ·	donepezil 10 milliGRAM(s) Oral at bedtime  ·	acetaminophen   Tablet. 325 milliGRAM(s) Oral every 4 hours PRN Mild Pain (1 - 3)  ·	memantine 10 milliGRAM(s) Oral every 12 hours  ·	citalopram 10 milliGRAM(s) Oral at bedtime  ·	haloperidol    Injectable 1 milliGRAM(s) IV Push at bedtime  ·	haloperidol    Injectable 1 milliGRAM(s) IV Push every 8 hours PRN Delerium  ·	lidocaine   Patch 1 Patch Transdermal every 24 hours  [x] Adequacy of sedation and pain control has been assessed and adjusted      RESPIRATORY  RR: 29 (09-19-17 @ 06:00) (20 - 44)  SpO2: 98% (09-19-17 @ 06:00) (88% - 100%)  Exam: clear to auscultation bilaterally  Mechanical Ventilation: none  [N/A] Extubation Readiness Assessed  Meds:  ·	ALBUTerol/ipratropium for Nebulization 3 milliLiter(s) Nebulizer three times a day      CARDIOVASCULAR  HR: 61 (09-19-17 @ 06:00) (61 - 92)  BP: 138/63 (09-19-17 @ 06:00) (126/60 - 203/125)  BP(mean): 91 (09-19-17 @ 06:00) (85 - 154)  Exam: regular rate and rhythm, S1S2  Cardiac Rhythm: sinus  Perfusion     [x]Adequate   [ ]Inadequate  Mentation   [ ]Normal       [x]Reduced - dementia at baseline  Extremities  [x]Warm         [ ]Cool  Volume Status [ ]Hypervolemic [x]Euvolemic [ ]Hypovolemic  Meds:  ·	hydrALAZINE Injectable 10 milliGRAM(s) IV Push every 4 hours PRN Systolic greater than 180  ·	metoprolol 25 milliGRAM(s) Oral two times a day      GI/NUTRITION  Exam: soft, nontender, nondistended  Diet: dysphagia 3 diet  Meds:  ·	pantoprazole    Tablet 40 milliGRAM(s) Oral two times a day before meals  ·	thiamine 100 milliGRAM(s) Oral daily      GENITOURINARY  I&O's Detail    09-18 @ 07:01  -  09-19 @ 07:00  --------------------------------------------------------  IN:    dextrose 5% + sodium chloride 0.45%.: 375 mL    dextrose 5%.: 450 mL    pantoprazole Infusion: 160 mL  Total IN: 985 mL    OUT:    Incontinent per Condom Catheter: 1360 mL  Total OUT: 1360 mL    Total NET: -375 mL    144  |  107  |  13  ----------------------------<  100<H>  3.9   |  22  |  0.97    Ca    9.1      19 Sep 2017 03:13  Phos  2.2     09-19  Mg     2.0     09-19    [N/A] Read catheter, indication:   Meds:  ·	tamsulosin 0.4 milliGRAM(s) Oral at bedtime  ·	dextrose 5% infuse at 50 mL/hr      HEMATOLOGIC  Meds: enoxaparin Injectable 40 milliGRAM(s) SubCutaneous every 24 hours  [x] VTE Prophylaxis                        10.2   11.5  )-----------( 207      ( 19 Sep 2017 03:13 )             29.2     PT/INR - ( 19 Sep 2017 03:13 )   PT: 13.7 sec;   INR: 1.25 ratio    PTT - ( 19 Sep 2017 03:13 )  PTT:28.5 sec      INFECTIOUS DISEASES  T(C): 36.9 (09-19-17 @ 03:00), Max: 38.1 (09-18-17 @ 11:00)  WBC Count: 11.5 K/uL (09-19 @ 03:13)  WBC Count: 10.2 K/uL (09-18 @ 20:04)  Recent Cultures: none  Meds: none      ENDOCRINE  Capillary Blood Glucose: none  Meds: levothyroxine 50 MICROGram(s) Oral daily      ACCESS DEVICES:  [x] Peripheral IV  [ ] Central Venous Line	[ ] R	[ ] L	[ ] IJ	[ ] Fem	[ ] SC	Placed:   [ ] Arterial Line		[ ] R	[ ] L	[ ] Fem	[ ] Rad	[ ] Ax	Placed:   [ ] PICC:					[ ] Mediport  [ ] Urinary Catheter, Date Placed:   [x] Necessity of urinary, arterial, and venous catheters discussed    OTHER MEDICATIONS:  latanoprost 0.005% Ophthalmic Solution 1 Drop(s) Both EYES at bedtime      CODE STATUS: full code    IMAGING: 24 HOUR EVENTS: Patient's Seroquel was d/lowell per family's request as they felt that he was too sedated. He was started on haldol 1 mg IV at bedtime with 1 mg IV q8hr PRN delirium. Patient was also given a dose of olanzapine 5 mg sublingual with good effect as patient slept comfortably all night. Added D5W at 50 mL/hr for hypernatremia of 148. Required multiple pushes of hydralazine and labetalol for systolic BPs greater than 180 mmHg. Added metoprolol 25 mg PO q12hrs and systolic BP improved to 150 mmHg.:      NEURO  CAM ICU: positive  Exam: letharic, very confused, with hypoactive delirium  Meds:  ·	donepezil 10 milliGRAM(s) Oral at bedtime  ·	acetaminophen   Tablet. 325 milliGRAM(s) Oral every 4 hours PRN Mild Pain (1 - 3)  ·	memantine 10 milliGRAM(s) Oral every 12 hours  ·	citalopram 10 milliGRAM(s) Oral at bedtime  ·	haloperidol    Injectable 1 milliGRAM(s) IV Push at bedtime  ·	haloperidol    Injectable 1 milliGRAM(s) IV Push every 8 hours PRN Delerium  ·	lidocaine   Patch 1 Patch Transdermal every 24 hours  [x] Adequacy of sedation and pain control has been assessed and adjusted      RESPIRATORY  RR: 29 (09-19-17 @ 06:00) (20 - 44)  SpO2: 98% (09-19-17 @ 06:00) (88% - 100%)  Exam: clear to auscultation bilaterally  ·	ALBUTerol/ipratropium for Nebulization 3 milliLiter(s) Nebulizer three times a day      CARDIOVASCULAR  HR: 61 (09-19-17 @ 06:00) (61 - 92)  BP: 138/63 (09-19-17 @ 06:00) (126/60 - 203/125)  BP(mean): 91 (09-19-17 @ 06:00) (85 - 154)  Exam: regular rate and rhythm, S1S2  Meds:  ·	hydrALAZINE Injectable 10 milliGRAM(s) IV Push every 4 hours PRN Systolic greater than 180  ·	metoprolol 25 milliGRAM(s) Oral two times a day      GI/NUTRITION  Exam: soft, nontender, nondistended  Diet: dysphagia 3 diet  Meds:  ·	pantoprazole    Tablet 40 milliGRAM(s) Oral two times a day before meals  ·	thiamine 100 milliGRAM(s) Oral daily      GENITOURINARY  I&O's Detail    09-18 @ 07:01  -  09-19 @ 07:00  --------------------------------------------------------  IN:    dextrose 5% + sodium chloride 0.45%.: 375 mL    dextrose 5%.: 450 mL    pantoprazole Infusion: 160 mL  Total IN: 985 mL    OUT:    Incontinent per Condom Catheter: 1360 mL  Total OUT: 1360 mL    Total NET: -375 mL    144  |  107  |  13  ----------------------------<  100<H>  3.9   |  22  |  0.97    Ca    9.1      19 Sep 2017 03:13  Phos  2.2     09-19  Mg     2.0     09-19    [N/A] Read catheter, indication:   Meds:  ·	tamsulosin 0.4 milliGRAM(s) Oral at bedtime  ·	dextrose 5% infuse at 50 mL/hr      HEMATOLOGIC  Meds: enoxaparin Injectable 40 milliGRAM(s) SubCutaneous every 24 hours  [x] VTE Prophylaxis                        10.2   11.5  )-----------( 207      ( 19 Sep 2017 03:13 )             29.2     PT/INR - ( 19 Sep 2017 03:13 )   PT: 13.7 sec;   INR: 1.25 ratio    PTT - ( 19 Sep 2017 03:13 )  PTT:28.5 sec      INFECTIOUS DISEASES  T(C): 36.9 (09-19-17 @ 03:00), Max: 38.1 (09-18-17 @ 11:00)  WBC Count: 11.5 K/uL (09-19 @ 03:13)  WBC Count: 10.2 K/uL (09-18 @ 20:04)  Recent Cultures: none  Meds: none      ENDOCRINE  Capillary Blood Glucose: none  Meds: levothyroxine 50 MICROGram(s) Oral daily      ACCESS DEVICES:  [x] Peripheral IV  [ ] Central Venous Line	[ ] R	[ ] L	[ ] IJ	[ ] Fem	[ ] SC	Placed:   [ ] Arterial Line		[ ] R	[ ] L	[ ] Fem	[ ] Rad	[ ] Ax	Placed:   [ ] PICC:					[ ] Mediport  [ ] Urinary Catheter, Date Placed:   [x] Necessity of urinary, arterial, and venous catheters discussed    OTHER MEDICATIONS:  latanoprost 0.005% Ophthalmic Solution 1 Drop(s) Both EYES at bedtime      CODE STATUS: full code    IMAGING:

## 2017-09-19 NOTE — PROGRESS NOTE ADULT - SUBJECTIVE AND OBJECTIVE BOX
Events noted. Agitated overnight and little response to Haldol so was given Zyprexa Zydis. Agitated this morning and RN says he calmed down with prn Haldol.       Vital Signs Last 24 Hrs  T(C): 36.9 (19 Sep 2017 07:00), Max: 38.1 (18 Sep 2017 11:00)  T(F): 98.4 (19 Sep 2017 07:00), Max: 100.6 (18 Sep 2017 11:00)  HR: 68 (19 Sep 2017 09:00) (61 - 92)  BP: 149/86 (19 Sep 2017 09:00) (126/60 - 203/125)  BP(mean): 106 (19 Sep 2017 09:00) (85 - 154)  RR: 27 (19 Sep 2017 09:00) (20 - 44)  SpO2: 99% (19 Sep 2017 09:00) (88% - 100%)                          10.2   11.5  )-----------( 207      ( 19 Sep 2017 03:13 )             29.2       09-19    144  |  107  |  13  ----------------------------<  100<H>  3.9   |  22  |  0.97    Ca    9.1      19 Sep 2017 03:13  Phos  2.2     09-19  Mg     2.0     09-19    TPro  5.2<L>  /  Alb  2.9<L>  /  TBili  0.4  /  DBili  x   /  AST  34  /  ALT  35  /  AlkPhos  80  09-18              MEDICATIONS  (STANDING):  donepezil 10 milliGRAM(s) Oral at bedtime  latanoprost 0.005% Ophthalmic Solution 1 Drop(s) Both EYES at bedtime  levothyroxine 50 MICROGram(s) Oral daily  lidocaine   Patch 1 Patch Transdermal every 24 hours  tamsulosin 0.4 milliGRAM(s) Oral at bedtime  ALBUTerol/ipratropium for Nebulization 3 milliLiter(s) Nebulizer three times a day  thiamine 100 milliGRAM(s) Oral daily  memantine 10 milliGRAM(s) Oral every 12 hours  citalopram 10 milliGRAM(s) Oral at bedtime  enoxaparin Injectable 40 milliGRAM(s) SubCutaneous every 24 hours  pantoprazole    Tablet 40 milliGRAM(s) Oral two times a day before meals  dextrose 5%. 1000 milliLiter(s) (50 mL/Hr) IV Continuous <Continuous>  metoprolol 25 milliGRAM(s) Oral two times a day  haloperidol    Injectable 2 milliGRAM(s) IV Push at bedtime    MEDICATIONS  (PRN):  hydrALAZINE Injectable 10 milliGRAM(s) IV Push every 4 hours PRN Systolic greater than 180  acetaminophen   Tablet. 325 milliGRAM(s) Oral every 4 hours PRN Mild Pain (1 - 3)  haloperidol    Injectable 1 milliGRAM(s) IV Push every 8 hours PRN Delerium      Elderly WM in bed, picking at the bedrails, eyes closed, alert to voice, and oriented x1. Poor attention and concentration. No SI.

## 2017-09-19 NOTE — PROGRESS NOTE ADULT - ASSESSMENT
89 yo male s/p fall with complicated hospital stay with GI bleed.    -Neuro: holding seroquel, haldol at bedtime and PRN. Pain control.  -Resp: stable, monitor pulse ox  -CV: HTN, continue beta-blocker and blood pressure control  -GI: Dysphagia 3 diet, continue on PPI gtt for 1 more day  -: condom catheter, monitor Is and Os  -Heme: DVT ppx  -ID: no intervention  Acute care per SICU  Dispo: patient is stable for transfer to floor or for discharge home with home care if family desires  Follow-up with GI, urology, trauma surgery, and PMD has suggested upon discharge    Antwon Marie MD

## 2017-09-19 NOTE — PROGRESS NOTE ADULT - SUBJECTIVE AND OBJECTIVE BOX
ACS Progress Note    S: Patient seen and examined by trauma team this AM. Seroquel d/c'd yesterday per family request d/t concerns for oversedation. Bedtime plus PRN haldol started and patient got one dose of olanzapine, slept well overnight. Hypertensive requiring multiple pushes of hyralazine and labetalol. Family wants to take patient home.    O:  Vital Signs Last 24 Hrs  T(C): 36.9 (19 Sep 2017 07:00), Max: 37.4 (18 Sep 2017 15:00)  T(F): 98.4 (19 Sep 2017 07:00), Max: 99.3 (18 Sep 2017 15:00)  HR: 60 (19 Sep 2017 12:00) (60 - 92)  BP: 113/58 (19 Sep 2017 11:00) (113/58 - 203/125)  BP(mean): 82 (19 Sep 2017 11:00) (82 - 154)  RR: 31 (19 Sep 2017 12:00) (20 - 44)  SpO2: 98% (19 Sep 2017 12:00) (88% - 100%)    I&O's Detail    18 Sep 2017 07:01  -  19 Sep 2017 07:00  --------------------------------------------------------  IN:    dextrose 5% + sodium chloride 0.45%.: 375 mL    dextrose 5%.: 450 mL    pantoprazole Infusion: 160 mL  Total IN: 985 mL    OUT:    Incontinent per Condom Catheter: 1360 mL  Total OUT: 1360 mL    Total NET: -375 mL    19 Sep 2017 07:01  -  19 Sep 2017 13:29  --------------------------------------------------------  IN:    dextrose 5%.: 100 mL  Total IN: 100 mL    OUT:  Total OUT: 0 mL    Total NET: 100 mL    MEDICATIONS  (STANDING):  donepezil 10 milliGRAM(s) Oral at bedtime  latanoprost 0.005% Ophthalmic Solution 1 Drop(s) Both EYES at bedtime  levothyroxine 50 MICROGram(s) Oral daily  lidocaine   Patch 1 Patch Transdermal every 24 hours  tamsulosin 0.4 milliGRAM(s) Oral at bedtime  ALBUTerol/ipratropium for Nebulization 3 milliLiter(s) Nebulizer three times a day  thiamine 100 milliGRAM(s) Oral daily  memantine 10 milliGRAM(s) Oral every 12 hours  citalopram 10 milliGRAM(s) Oral at bedtime  enoxaparin Injectable 40 milliGRAM(s) SubCutaneous every 24 hours  dextrose 5%. 1000 milliLiter(s) (50 mL/Hr) IV Continuous <Continuous>  metoprolol 25 milliGRAM(s) Oral two times a day  haloperidol    Injectable 2 milliGRAM(s) IV Push at bedtime  pantoprazole   Suspension 40 milliGRAM(s) Oral two times a day before meals    MEDICATIONS  (PRN):  hydrALAZINE Injectable 10 milliGRAM(s) IV Push every 4 hours PRN Systolic greater than 180  acetaminophen   Tablet. 325 milliGRAM(s) Oral every 4 hours PRN Mild Pain (1 - 3)  haloperidol    Injectable 1 milliGRAM(s) IV Push every 8 hours PRN Delerium                     10.2   11.5  )-----------( 207      ( 19 Sep 2017 03:13 )             29.2     09-19    144  |  107  |  13  ----------------------------<  100<H>  3.9   |  22  |  0.97    Ca    9.1      19 Sep 2017 03:13  Phos  2.2     09-19  Mg     2.0     09-19    TPro  5.2<L>  /  Alb  2.9<L>  /  TBili  0.4  /  DBili  x   /  AST  34  /  ALT  35  /  AlkPhos  80  09-18    Physical Exam:  Gen: Laying in bed, NAD, resting comfortably  Resp: Unlabored breathing, no supplemental O2  Abd: soft, NT, ND    Lines:   IV: patent, in place.

## 2017-09-19 NOTE — PROGRESS NOTE ADULT - SUBJECTIVE AND OBJECTIVE BOX
Chief Complaint:  Patient is a 88y old  Male who presents with a chief complaint of unwitnessed fall, multiple rib fractures with ptx on xray (08 Sep 2017 11:31)      Interval Events:     Allergies:  No Known Allergies      Hospital Medications:  donepezil 10 milliGRAM(s) Oral at bedtime  latanoprost 0.005% Ophthalmic Solution 1 Drop(s) Both EYES at bedtime  levothyroxine 50 MICROGram(s) Oral daily  lidocaine   Patch 1 Patch Transdermal every 24 hours  hydrALAZINE Injectable 10 milliGRAM(s) IV Push every 4 hours PRN  tamsulosin 0.4 milliGRAM(s) Oral at bedtime  acetaminophen   Tablet. 325 milliGRAM(s) Oral every 4 hours PRN  ALBUTerol/ipratropium for Nebulization 3 milliLiter(s) Nebulizer three times a day  thiamine 100 milliGRAM(s) Oral daily  memantine 10 milliGRAM(s) Oral every 12 hours  citalopram 10 milliGRAM(s) Oral at bedtime  enoxaparin Injectable 40 milliGRAM(s) SubCutaneous every 24 hours  pantoprazole    Tablet 40 milliGRAM(s) Oral two times a day before meals  haloperidol    Injectable 1 milliGRAM(s) IV Push at bedtime  haloperidol    Injectable 1 milliGRAM(s) IV Push every 8 hours PRN  dextrose 5%. 1000 milliLiter(s) IV Continuous <Continuous>  metoprolol 25 milliGRAM(s) Oral two times a day  potassium acid phosphate/sodium acid phosphate tablet (K-PHOS No. 2) 1 Tablet(s) Oral four times a day with meals      PMHX/PSHX:  HTN (hypertension)  Skin cancer  Parotid neoplasm  Carotid artery disease  Dementia  Glaucoma  Hypothyroid  CAD (coronary artery disease)  Artificial cardiac pacemaker  H/O carotid endarterectomy  S/P hernia repair  S/P knee replacement  H/O shoulder surgery  Stented coronary artery      Family history:  Family history of heart disease (Father)  No pertinent family history in first degree relatives      ROS:     General:  No wt loss, fevers, chills, night sweats, fatigue,   Eyes:  Good vision, no reported pain  ENT:  No sore throat, pain, runny nose, dysphagia  CV:  No pain, palpitations, hypo/hypertension  Resp:  No dyspnea, cough, tachypnea, wheezing  GI:  See HPI  :  No pain, bleeding, incontinence, nocturia  Muscle:  No pain, weakness  Neuro:  No weakness, tingling, memory problems  Psych:  No fatigue, insomnia, mood problems, depression  Endocrine:  No polyuria, polydipsia, cold/heat intolerance  Heme:  No petechiae, ecchymosis, easy bruisability  Skin:  No rash, edema      PHYSICAL EXAM:     GENERAL:  Appears stated age, well-groomed, well-nourished, no distress  HEENT:  NC/AT,  conjunctivae clear, sclera -anicteric  CHEST:  Full & symmetric excursion, no increased effort, breath sounds clear  HEART:  Regular rhythm, S1, S2, no murmur/rub/S3/S4,  no edema  ABDOMEN:  Soft, non-tender, non-distended, normoactive bowel sounds,  no masses ,no hepato-splenomegaly,   EXTREMITIES:  no cyanosis,clubbing or edema  SKIN:  No rash/erythema/ecchymoses/petechiae/wounds/abscess/warm/dry  NEURO:  Alert, oriented    Vital Signs:  Vital Signs Last 24 Hrs  T(C): 36.9 (19 Sep 2017 07:00), Max: 38.1 (18 Sep 2017 11:00)  T(F): 98.4 (19 Sep 2017 07:00), Max: 100.6 (18 Sep 2017 11:00)  HR: 68 (19 Sep 2017 09:00) (61 - 92)  BP: 149/86 (19 Sep 2017 09:00) (126/60 - 203/125)  BP(mean): 106 (19 Sep 2017 09:00) (85 - 154)  RR: 27 (19 Sep 2017 09:00) (20 - 44)  SpO2: 99% (19 Sep 2017 09:00) (88% - 100%)  Daily     Daily Weight in k.5 (19 Sep 2017 06:00)    LABS:                        10.2   11.5  )-----------( 207      ( 19 Sep 2017 03:13 )             29.2     -    144  |  107  |  13  ----------------------------<  100<H>  3.9   |  22  |  0.97    Ca    9.1      19 Sep 2017 03:13  Phos  2.2     -  Mg     2.0     -    TPro  5.2<L>  /  Alb  2.9<L>  /  TBili  0.4  /  DBili  x   /  AST  34  /  ALT  35  /  AlkPhos  80  09-18    LIVER FUNCTIONS - ( 18 Sep 2017 01:59 )  Alb: 2.9 g/dL / Pro: 5.2 g/dL / ALK PHOS: 80 U/L / ALT: 35 U/L RC / AST: 34 U/L / GGT: x           PT/INR - ( 19 Sep 2017 03:13 )   PT: 13.7 sec;   INR: 1.25 ratio         PTT - ( 19 Sep 2017 03:13 )  PTT:28.5 sec        Imaging: Chief Complaint:  Patient is a 88y old  Male who presents with a chief complaint of unwitnessed fall, multiple rib fractures with ptx on xray (08 Sep 2017 11:31)      Interval Events:   Patient seen and examined today. He has no more episdoes of vomiting.  He was able to tolerate his breakfast this am without difficulty.  He is drowsy and delirious on exam today.      Allergies:  No Known Allergies      Hospital Medications:  donepezil 10 milliGRAM(s) Oral at bedtime  latanoprost 0.005% Ophthalmic Solution 1 Drop(s) Both EYES at bedtime  levothyroxine 50 MICROGram(s) Oral daily  lidocaine   Patch 1 Patch Transdermal every 24 hours  hydrALAZINE Injectable 10 milliGRAM(s) IV Push every 4 hours PRN  tamsulosin 0.4 milliGRAM(s) Oral at bedtime  acetaminophen   Tablet. 325 milliGRAM(s) Oral every 4 hours PRN  ALBUTerol/ipratropium for Nebulization 3 milliLiter(s) Nebulizer three times a day  thiamine 100 milliGRAM(s) Oral daily  memantine 10 milliGRAM(s) Oral every 12 hours  citalopram 10 milliGRAM(s) Oral at bedtime  enoxaparin Injectable 40 milliGRAM(s) SubCutaneous every 24 hours  pantoprazole    Tablet 40 milliGRAM(s) Oral two times a day before meals  haloperidol    Injectable 1 milliGRAM(s) IV Push at bedtime  haloperidol    Injectable 1 milliGRAM(s) IV Push every 8 hours PRN  dextrose 5%. 1000 milliLiter(s) IV Continuous <Continuous>  metoprolol 25 milliGRAM(s) Oral two times a day  potassium acid phosphate/sodium acid phosphate tablet (K-PHOS No. 2) 1 Tablet(s) Oral four times a day with meals      PMHX/PSHX:  HTN (hypertension)  Skin cancer  Parotid neoplasm  Carotid artery disease  Dementia  Glaucoma  Hypothyroid  CAD (coronary artery disease)  Artificial cardiac pacemaker  H/O carotid endarterectomy  S/P hernia repair  S/P knee replacement  H/O shoulder surgery  Stented coronary artery      Family history:  Family history of heart disease (Father)  No pertinent family history in first degree relatives      ROS:     unable to assess      PHYSICAL EXAM:     GENERAL:  Appears stated age, well-groomed, well-nourished, no distress  HEENT:  NC/AT,  conjunctivae clear, sclera -anicteric  CHEST:  Full & symmetric excursion, no increased effort, breath sounds clear  HEART:  Regular rhythm, S1, S2, no murmur/rub/S3/S4,  no edema  ABDOMEN:  Soft, non-tender, non-distended, normoactive bowel sounds,  no masses ,no hepato-splenomegaly,   EXTREMITIES:  no cyanosis,clubbing or edema  NEURO:  Drowsy    Vital Signs:  Vital Signs Last 24 Hrs  T(C): 36.9 (19 Sep 2017 07:00), Max: 38.1 (18 Sep 2017 11:00)  T(F): 98.4 (19 Sep 2017 07:00), Max: 100.6 (18 Sep 2017 11:00)  HR: 68 (19 Sep 2017 09:00) (61 - 92)  BP: 149/86 (19 Sep 2017 09:00) (126/60 - 203/125)  BP(mean): 106 (19 Sep 2017 09:00) (85 - 154)  RR: 27 (19 Sep 2017 09:00) (20 - 44)  SpO2: 99% (19 Sep 2017 09:00) (88% - 100%)  Daily     Daily Weight in k.5 (19 Sep 2017 06:00)    LABS:                        10.2   11.5  )-----------( 207      ( 19 Sep 2017 03:13 )             29.2         144  |  107  |  13  ----------------------------<  100<H>  3.9   |  22  |  0.97    Ca    9.1      19 Sep 2017 03:13  Phos  2.2       Mg     2.0         TPro  5.2<L>  /  Alb  2.9<L>  /  TBili  0.4  /  DBili  x   /  AST  34  /  ALT  35  /  AlkPhos  80  18    LIVER FUNCTIONS - ( 18 Sep 2017 01:59 )  Alb: 2.9 g/dL / Pro: 5.2 g/dL / ALK PHOS: 80 U/L / ALT: 35 U/L RC / AST: 34 U/L / GGT: x           PT/INR - ( 19 Sep 2017 03:13 )   PT: 13.7 sec;   INR: 1.25 ratio         PTT - ( 19 Sep 2017 03:13 )  PTT:28.5 sec        Imaging:      < from: Upper Endoscopy (17 @ 14:18) >                                                                Impression:          - Small hiatus hernia.                       - Congestive gastropathy. Biopsied.                       - One non-bleeding duodenal ulcer with a flat pigmented spot (Ian Class                        IIc).                       - Multiple non-bleeding duodenal ulcers with a clean ulcer base (Ian                        Class III).                       - One non-bleeding duodenal ulcer with a nonbleeding visible vessel (Ian                        Class IIa). Clip (MR conditional) was placed.                       - Normal 2nd part of the duodenum.  Recommendation:      - Return patient to ICU for ongoing care.                       - NPO today.                - Use a proton pump inhibitor IV for 3 days.                       - Await pathology results.    < end of copied text >

## 2017-09-19 NOTE — PROGRESS NOTE ADULT - ATTENDING COMMENTS
Patient seen and examined on AM SICU rounds.  No signs of ongoing GI bleed  Pain from rib fractures seems well controlled  Still with severe hypoactive delirium.    I feel patient will likely not improved while in the SICU given the ICU environment.  Will transfer to the floor on strict 1:1 observation for safety.  I have discussed care with patient's daughter-in-law.

## 2017-09-20 VITALS
SYSTOLIC BLOOD PRESSURE: 146 MMHG | HEART RATE: 67 BPM | TEMPERATURE: 98 F | RESPIRATION RATE: 18 BRPM | OXYGEN SATURATION: 95 % | DIASTOLIC BLOOD PRESSURE: 64 MMHG

## 2017-09-20 LAB
ANION GAP SERPL CALC-SCNC: 11 MMOL/L — SIGNIFICANT CHANGE UP (ref 5–17)
BUN SERPL-MCNC: 13 MG/DL — SIGNIFICANT CHANGE UP (ref 7–23)
CALCIUM SERPL-MCNC: 8.7 MG/DL — SIGNIFICANT CHANGE UP (ref 8.4–10.5)
CHLORIDE SERPL-SCNC: 106 MMOL/L — SIGNIFICANT CHANGE UP (ref 96–108)
CO2 SERPL-SCNC: 24 MMOL/L — SIGNIFICANT CHANGE UP (ref 22–31)
CREAT SERPL-MCNC: 0.99 MG/DL — SIGNIFICANT CHANGE UP (ref 0.5–1.3)
GLUCOSE SERPL-MCNC: 102 MG/DL — HIGH (ref 70–99)
HCT VFR BLD CALC: 28.9 % — LOW (ref 39–50)
HGB BLD-MCNC: 10.1 G/DL — LOW (ref 13–17)
MAGNESIUM SERPL-MCNC: 1.9 MG/DL — SIGNIFICANT CHANGE UP (ref 1.6–2.6)
MCHC RBC-ENTMCNC: 33 PG — SIGNIFICANT CHANGE UP (ref 27–34)
MCHC RBC-ENTMCNC: 34.9 GM/DL — SIGNIFICANT CHANGE UP (ref 32–36)
MCV RBC AUTO: 94.6 FL — SIGNIFICANT CHANGE UP (ref 80–100)
PHOSPHATE SERPL-MCNC: 2.9 MG/DL — SIGNIFICANT CHANGE UP (ref 2.5–4.5)
PLATELET # BLD AUTO: 202 K/UL — SIGNIFICANT CHANGE UP (ref 150–400)
POTASSIUM SERPL-MCNC: 3.5 MMOL/L — SIGNIFICANT CHANGE UP (ref 3.5–5.3)
POTASSIUM SERPL-SCNC: 3.5 MMOL/L — SIGNIFICANT CHANGE UP (ref 3.5–5.3)
RBC # BLD: 3.06 M/UL — LOW (ref 4.2–5.8)
RBC # FLD: 14.5 % — SIGNIFICANT CHANGE UP (ref 10.3–14.5)
SODIUM SERPL-SCNC: 141 MMOL/L — SIGNIFICANT CHANGE UP (ref 135–145)
WBC # BLD: 8.7 K/UL — SIGNIFICANT CHANGE UP (ref 3.8–10.5)
WBC # FLD AUTO: 8.7 K/UL — SIGNIFICANT CHANGE UP (ref 3.8–10.5)

## 2017-09-20 PROCEDURE — 99232 SBSQ HOSP IP/OBS MODERATE 35: CPT

## 2017-09-20 PROCEDURE — 99223 1ST HOSP IP/OBS HIGH 75: CPT

## 2017-09-20 RX ORDER — PANTOPRAZOLE SODIUM 20 MG/1
1 TABLET, DELAYED RELEASE ORAL
Qty: 0 | Refills: 0 | COMMUNITY
Start: 2017-09-20

## 2017-09-20 RX ORDER — LEVOTHYROXINE SODIUM 125 MCG
1 TABLET ORAL
Qty: 30 | Refills: 0 | OUTPATIENT
Start: 2017-09-20 | End: 2017-10-20

## 2017-09-20 RX ORDER — TAMSULOSIN HYDROCHLORIDE 0.4 MG/1
1 CAPSULE ORAL
Qty: 30 | Refills: 0 | OUTPATIENT
Start: 2017-09-20 | End: 2017-10-20

## 2017-09-20 RX ORDER — HALOPERIDOL DECANOATE 100 MG/ML
2 INJECTION INTRAMUSCULAR AT BEDTIME
Qty: 0 | Refills: 0 | Status: DISCONTINUED | OUTPATIENT
Start: 2017-09-20 | End: 2017-09-20

## 2017-09-20 RX ORDER — HALOPERIDOL DECANOATE 100 MG/ML
1 INJECTION INTRAMUSCULAR
Qty: 90 | Refills: 0 | OUTPATIENT
Start: 2017-09-20 | End: 2017-10-20

## 2017-09-20 RX ORDER — PANTOPRAZOLE SODIUM 20 MG/1
1 TABLET, DELAYED RELEASE ORAL
Qty: 60 | Refills: 0 | OUTPATIENT
Start: 2017-09-20 | End: 2017-10-20

## 2017-09-20 RX ADMIN — Medication 3 MILLILITER(S): at 14:00

## 2017-09-20 RX ADMIN — PANTOPRAZOLE SODIUM 40 MILLIGRAM(S): 20 TABLET, DELAYED RELEASE ORAL at 08:37

## 2017-09-20 RX ADMIN — Medication 3 MILLILITER(S): at 06:02

## 2017-09-20 RX ADMIN — ENOXAPARIN SODIUM 40 MILLIGRAM(S): 100 INJECTION SUBCUTANEOUS at 14:00

## 2017-09-20 RX ADMIN — LIDOCAINE 1 PATCH: 4 CREAM TOPICAL at 14:00

## 2017-09-20 RX ADMIN — LIDOCAINE 1 PATCH: 4 CREAM TOPICAL at 02:58

## 2017-09-20 RX ADMIN — PANTOPRAZOLE SODIUM 40 MILLIGRAM(S): 20 TABLET, DELAYED RELEASE ORAL at 17:19

## 2017-09-20 NOTE — PROGRESS NOTE ADULT - ASSESSMENT
UGIB with anemia - hemodynamically stable post Tx RBC  Postraumatic (fall) multiple R rib fractures and large R lpneumothorax   Respansion R lung post R chest tube insertion  Dementia with agitation and lethargy:  REC    Repeat CXR - upright  Nasal oxygen for sat >= 90%  Mobilize with PT as tolerated  DVT prophylaxis  GI f/u

## 2017-09-20 NOTE — PROGRESS NOTE ADULT - ATTENDING COMMENTS
Pt seen and examined. Agree with A/P. Still lethargic but arousable this afternoon. Ok to d/c home today.

## 2017-09-20 NOTE — PROGRESS NOTE ADULT - NEUROLOGICAL DETAILS
responds to pain/responds to verbal commands
responds to verbal commands/responds to pain
responds to verbal commands/responds to pain
responds to pain/responds to verbal commands

## 2017-09-20 NOTE — PROGRESS NOTE ADULT - NSHPATTENDINGPLANDISCUSS_GEN_ALL_CORE
Patient/family
Patient/family
[facility] team
Dr. Stevens
Resident
SICU MDs
SICU resident
resident
son daughter, GI, pulmonology
surgery, his son daughter in law

## 2017-09-20 NOTE — CONSULT NOTE ADULT - CONSULT REQUESTED DATE/TIME
06-Sep-2017 12:36
05-Sep-2017 15:00
05-Sep-2017 15:12
08-Sep-2017 15:57
11-Sep-2017 16:21
20-Sep-2017 15:37
16-Sep-2017 17:41
06-Sep-2017
06-Sep-2017

## 2017-09-20 NOTE — CONSULT NOTE ADULT - CONSULT REASON
Urinary retention, hematuria
Agitation
Evaluate cardiac status
Medical management
Multiple rib fractures/ R pneumothorax
Rash
Trauma for ICU monitoring
s/p fall w/ multiple rib fractures and right pneumothorax
afshan

## 2017-09-20 NOTE — PROGRESS NOTE ADULT - RS GEN PE MLT RESP DETAILS PC
breath sounds equal/airway patent
airway patent/breath sounds equal
airway patent/breath sounds equal
breath sounds equal/airway patent

## 2017-09-20 NOTE — PROGRESS NOTE ADULT - SUBJECTIVE AND OBJECTIVE BOX
Events noted. One to one says he has fluctuating levels of arousal and agitation. Ate very little today. He is unable to take many of his pills.       Vital Signs Last 24 Hrs  T(C): 37 (20 Sep 2017 09:58), Max: 37.5 (20 Sep 2017 06:00)  T(F): 98.6 (20 Sep 2017 09:58), Max: 99.5 (20 Sep 2017 06:00)  HR: 73 (20 Sep 2017 09:58) (57 - 73)  BP: 153/67 (20 Sep 2017 09:58) (114/65 - 215/154)  BP(mean): 86 (19 Sep 2017 16:14) (81 - 179)  RR: 18 (20 Sep 2017 09:58) (16 - 37)  SpO2: 94% (20 Sep 2017 09:58) (94% - 99%)                          10.2   11.5  )-----------( 207      ( 19 Sep 2017 03:13 )             29.2       09-19    144  |  107  |  13  ----------------------------<  100<H>  3.9   |  22  |  0.97    Ca    9.1      19 Sep 2017 03:13  Phos  2.2     09-19  Mg     2.0     09-19                MEDICATIONS  (STANDING):  donepezil 10 milliGRAM(s) Oral at bedtime  latanoprost 0.005% Ophthalmic Solution 1 Drop(s) Both EYES at bedtime  levothyroxine 50 MICROGram(s) Oral daily  lidocaine   Patch 1 Patch Transdermal every 24 hours  tamsulosin 0.4 milliGRAM(s) Oral at bedtime  ALBUTerol/ipratropium for Nebulization 3 milliLiter(s) Nebulizer three times a day  thiamine 100 milliGRAM(s) Oral daily  memantine 10 milliGRAM(s) Oral every 12 hours  citalopram 10 milliGRAM(s) Oral at bedtime  enoxaparin Injectable 40 milliGRAM(s) SubCutaneous every 24 hours  dextrose 5%. 1000 milliLiter(s) (50 mL/Hr) IV Continuous <Continuous>  metoprolol 25 milliGRAM(s) Oral two times a day  pantoprazole   Suspension 40 milliGRAM(s) Oral two times a day before meals  haloperidol    Injectable 2 milliGRAM(s) IV Push at bedtime    MEDICATIONS  (PRN):  hydrALAZINE Injectable 10 milliGRAM(s) IV Push every 4 hours PRN Systolic greater than 180  acetaminophen   Tablet. 325 milliGRAM(s) Oral every 4 hours PRN Mild Pain (1 - 3)  haloperidol    Injectable 1 milliGRAM(s) IV Push every 8 hours PRN Delerium      Elderly WM in bed lethargic. Opens eyes briefly to touch and quickly falls asleep. No tremors/diaphoresis.

## 2017-09-20 NOTE — PROGRESS NOTE ADULT - ASSESSMENT
Dementia with delirium      Recommend  Discontinue Aricept given his recent GI bleed  Hold Haldol if sedated. Follow QTc.    Stefan Santillan M.D.  Psychiatry  [phone]

## 2017-09-20 NOTE — PROGRESS NOTE ADULT - NS NEC GEN PE MLT EXAM PC
No bruits; no thyromegaly or nodules
detailed exam

## 2017-09-20 NOTE — PROGRESS NOTE ADULT - GASTROINTESTINAL
Soft, non-tender, no hepatosplenomegaly, normal bowel sounds
detailed exam
Soft, non-tender, no hepatosplenomegaly, normal bowel sounds

## 2017-09-20 NOTE — PROGRESS NOTE ADULT - ASSESSMENT
89 y/o M w/ hx of HTN HLD CAD s/p PPM, dementia aaox 1-2 at baseline, glaucoma, hypothyroidism and parotid neoplasm who presented s/p fall in the bathtub found to have  R rib 5-8 fractures and pneumothorax s/p chest tube and removal.   Pneumothorax resolved.  pt treated for cystitis.  pt w/ acute blood loss anemia 2/2 to doudenal ulcer s/p intervention.  HB stable, DC planning?          Acute Blood Loss Anemia / Duodenal Ulcer - 2/2 bleeding duodenal ulcer, now s/p clipping and APC, H/h now stable,      Rib Fx - Pain controlled     Bradycardia -resolved hr stable off bb currently     Bladder wall thickening / Cystitis - finished course of abx    HTN HLD CAD s/p PPM - avoid aggressive control in elderly monitor c/w telemetry     Dementia /  Delirium - improved stable currently    DVT PPX

## 2017-09-20 NOTE — CONSULT NOTE ADULT - SUBJECTIVE AND OBJECTIVE BOX
Chief Complaint:  Patient is a 88y old  Male who presents with a chief complaint of unwitnessed fall, multiple rib fractures with ptx on xray (08 Sep 2017 11:31)      HPI:   89yo M with pmhx CAD s/p stent, ppm, and progressive dementia who presented to the ED earlier this month after mechanical fall.  He was found to have broken 4 ribs on the right and had a large right pneumonthorax requiring catheter placement.  He was stabilized, sent to the floor, and was getting ready for discharge home without further lab checks when the patient became acutely ill and a code blue was called (though patient did not lose a pulse).  He was hypotensive with systolics in the 80s and had a very large melanotic stool.  His hemoglobin, which on admission was 12, was found to be 6.1 (from 7 earlier that day).  He was transferred to SICU and a NGT was placed and PPI gtt initiated. NG lavage was negative.  He was transfused two units and stabilized hemodynamically.  His hgb came up to 8.9 but drifted down to the low 7s in the AM, requiring another 2 units of blood to be transfused.  GI was consulted for further eval and management.      Allergies:  No Known Allergies      Home Medications:    Hospital Medications:  citalopram 10 milliGRAM(s) Oral daily  donepezil 10 milliGRAM(s) Oral at bedtime  latanoprost 0.005% Ophthalmic Solution 1 Drop(s) Both EYES at bedtime  levothyroxine 50 MICROGram(s) Oral daily  lidocaine   Patch 1 Patch Transdermal every 24 hours  hydrALAZINE Injectable 10 milliGRAM(s) IV Push every 4 hours PRN  tamsulosin 0.4 milliGRAM(s) Oral at bedtime  acetaminophen   Tablet. 325 milliGRAM(s) Oral every 4 hours PRN  ALBUTerol/ipratropium for Nebulization 3 milliLiter(s) Nebulizer three times a day  thiamine 100 milliGRAM(s) Oral daily  QUEtiapine 12.5 milliGRAM(s) Oral every 8 hours PRN  pantoprazole Infusion 8 mG/Hr IV Continuous <Continuous>  dextrose 5% + sodium chloride 0.45%. 1000 milliLiter(s) IV Continuous <Continuous>      PMHX/PSHX:  HTN (hypertension)  Skin cancer  Parotid neoplasm  Carotid artery disease  Dementia  Glaucoma  Hypothyroid  CAD (coronary artery disease)  Artificial cardiac pacemaker  H/O carotid endarterectomy  S/P hernia repair  S/P knee replacement  H/O shoulder surgery  Stented coronary artery      Family history:  Family history of heart disease (Father)  No pertinent family history in first degree relatives      Social History: former ETOH use 20 years prior.  denies tobacco, ETOH.  former pitcher    ROS:  unable to assess 2/2 pt mental status     PHYSICAL EXAM:     GENERAL:  intermittently agitated, occasionally responsive to questions  HEENT:  NC/AT  CHEST:  Full & symmetric excursion  HEART:  Regular rhythm  ABDOMEN:  Soft, non-tender, non-distended, normoactive bowel sounds  EXTREMITIES:  no cyanosis, clubbing or edema  SKIN:  ecchymoses throughout   NEURO:  somnolent, intermittently awakens and is agitated, disoriented     Vital Signs:  Vital Signs Last 24 Hrs  T(C): 37.2 (16 Sep 2017 15:00), Max: 37.6 (15 Sep 2017 23:00)  T(F): 99 (16 Sep 2017 15:00), Max: 99.7 (15 Sep 2017 23:00)  HR: 60 (16 Sep 2017 16:31) (53 - 91)  BP: 130/60 (15 Sep 2017 23:15) (130/60 - 130/60)  BP(mean): 87 (15 Sep 2017 23:15) (87 - 87)  RR: 19 (16 Sep 2017 16:00) (15 - 46)  SpO2: 100% (16 Sep 2017 16:31) (88% - 100%)  Daily     Daily Weight in k.8 (16 Sep 2017 01:00)    LABS:                        8.6    13.4  )-----------( 214      ( 16 Sep 2017 17:12 )             24.5     09-16    150<H>  |  115<H>  |  54<H>  ----------------------------<  118<H>  4.0   |  23  |  1.14    Ca    8.5      16 Sep 2017 04:43  Phos  2.9     09-16  Mg     2.2     09-16        PT/INR - ( 16 Sep 2017 04:43 )   PT: 14.3 sec;   INR: 1.32 ratio         PTT - ( 16 Sep 2017 04:43 )  PTT:27.5 sec  Urinalysis Basic - ( 15 Sep 2017 03:23 )    Color: Yellow / Appearance: Clear / S.029 / pH: x  Gluc: x / Ketone: Small  / Bili: Negative / Urobili: Negative mg/dL   Blood: x / Protein: Trace mg/dL / Nitrite: Negative   Leuk Esterase: Small / RBC: 28 /HPF / WBC 4 /HPF   Sq Epi: x / Non Sq Epi: 3 /HPF / Bacteria: Negative          Imaging:    no new imaging    Endoscopy:   please see report
HISTORY OF PRESENT ILLNESS:  ANKIT ORTEGA is a 88y Man w/ PMH sig for HTN, CAD s/p stent, dementia, s/p b/l TKR, L "shoulder surgery", R CEA approximately 2 years ago at [facility], and resection of b/l "neck cancer" approximately 15 years ago. He presented to [facility] ED on the morning of 9/5/17 after slipping and falling getting out of the shower. He was found down after approximately 30 minutes. When his son attempted to help him up, he slipped again, landing on his back/buttocks. In the trauma bay, he was found to have a large right sided pneumothorax. A pigtail catheter was placed in the R anterior chest. A follow up chest CT showed resolution of the R PTX and acute mildly displaced  fractures of the R 4-8 ribs. On bedside exam after transfer to SICU, he denies LOC, head ache, vision changes, nausea, emesis, SoB, CP, abd pain, limb pain, numbness, or tingling.    PAST MEDICAL HISTORY: HTN (hypertension)  Skin cancer  Parotid neoplasm  Carotid artery disease  Dementia  Glaucoma  Hypothyroid  CAD (coronary artery disease)      PAST SURGICAL HISTORY: Artificial cardiac pacemaker  H/O carotid endarterectomy  S/P hernia repair  S/P knee replacement  H/O shoulder surgery  Stented coronary artery      FAMILY HISTORY: Family history of heart disease (Father)  No pertinent family history in first degree relatives      SOCIAL HISTORY: Lives at home with family    CODE STATUS: Presumed full code    HOME MEDS:  citalopram 10 milliGRAM(s) Oral daily  donepezil 10 milliGRAM(s) Oral at bedtime  memantine 10 milliGRAM(s) Oral every 12 hours  Synthroid 50 microGRAM(s) Oral daily  metoprolol 50 milliGRAM(s) extended release Oral daily      ALLERGIES: No Known Allergies      VITAL SIGNS:  ICU Vital Signs Last 24 Hrs  T(C): 37.1 (05 Sep 2017 11:25), Max: 37.1 (05 Sep 2017 11:25)  T(F): 98.8 (05 Sep 2017 11:25), Max: 98.8 (05 Sep 2017 11:25)  HR: 54 (05 Sep 2017 14:45) (54 - 58)  BP: 120/84 (05 Sep 2017 14:45) (120/84 - 179/78)  BP(mean): 95 (05 Sep 2017 14:45) (95 - 95)  ABP: --  ABP(mean): --  RR: 16 (05 Sep 2017 14:45) (16 - 20)  SpO2: 91% (05 Sep 2017 14:45) (91% - 97%)      NEURO  Exam: Alert, oriented to person, place, and situation. Intermittently agitated and confused in bed. Strength 5/5 in all 4 extremities. Sensation intact throughout.  dexmedetomidine Infusion 0.4 MICROgram(s)/kG/Hr IV Continuous <Continuous>  acetaminophen  IVPB. 1000 milliGRAM(s) IV Intermittent once  acetaminophen  IVPB. 1000 milliGRAM(s) IV Intermittent once  HYDROmorphone  Injectable 0.25 milliGRAM(s) IV Push every 3 hours PRN Severe Pain (7 - 10)  citalopram 10 milliGRAM(s) Oral daily  donepezil 10 milliGRAM(s) Oral at bedtime  memantine 10 milliGRAM(s) Oral every 12 hours      RESPIRATORY  Exam: CTAB. No increased WoB. R chest wall tenderness. R anterior pigtail in place w/ serosanguinous drainage and small air leak      CARDIOVASCULAR  VBG - ( 05 Sep 2017 11:59 )  pH: 7.34  /  pCO2: 55    /  pO2: 37    / HCO3: 29    / Base Excess: 2.3   /  SaO2: 63     Lactate: 2.6      Exam: RRR  Cardiac Rhythm: Sinus lazara      GI/NUTRITION  Exam: Soft, ND, NT, No rebound or guarding  Diet: Regular      GENITOURINARY/RENAL  lactated ringers. 1000 milliLiter(s) IV Continuous <Continuous>      Weight (kg): 77.1 (09-05 @ 11:25)  09-05    140  |  100  |  23  ----------------------------<  109<H>  4.2   |  27  |  1.17    Ca    10.4      05 Sep 2017 11:59    TPro  7.5  /  Alb  4.2  /  TBili  0.6  /  DBili  x   /  AST  39  /  ALT  28  /  AlkPhos  132<H>  09-05    HEMATOLOGIC  [x ] VTE Prophylaxis:  enoxaparin Injectable 40 milliGRAM(s) SubCutaneous every 24 hours                          14.0   10.7  )-----------( 257      ( 05 Sep 2017 11:59 )             42.5     PT/INR - ( 05 Sep 2017 11:59 )   PT: 11.2 sec;   INR: 1.04 ratio         PTT - ( 05 Sep 2017 11:59 )  PTT:32.1 sec  Transfusion: [ ] PRBC	[ ] Platelets	[ ] FFP	[ ] Cryoprecipitate      PATIENT CARE ACCESS DEVICES:  [x] Peripheral IV  [ ] Central Venous Line	[ ] R	[ ] L	[ ] IJ	[ ] Fem	[ ] SC	Placed:   [ ] Arterial Line		[ ] R	[ ] L	[ ] Fem	[ ] Rad	[ ] Ax	Placed:   [ ] PICC:					[ ] Mediport  [ ] Urinary Catheter, Date Placed:   [x] Necessity of urinary, arterial, and venous catheters discussed    OTHER MEDICATIONS: latanoprost 0.005% Ophthalmic Solution 1 Drop(s) Both EYES at bedtime      IMAGING STUDIES:  < from: CT Head No Cont (09.05.17 @ 14:01) >  Impression:    Brain CT: No acute hemorrhage or major distribution infarct.  Diffuse mottling of the left parietal calvarium may be neoplastic in   origin. Comparison with prior studies if available is recommended. A   follow-up nuclear medicine bone scan can be obtained for further   evaluation.    Cervical spine CT: No acute fractureor traumatic subluxation. Multilevel   degenerative changes.  Right apical pneumothorax. Please see chest CT scan of the same day for   further evaluation    < end of copied text >    < from: CT Abdomen and Pelvis w/ IV Cont (09.05.17 @ 14:01) >  IMPRESSION:     1.  Acute mildly displaced  fractures of the right fifth through eighth   ribs.  2.  Interval placement of a right-sided pigtail catheter with reexpansion   of the right lung with a residual trace pneumothorax.  3.  No evidence of aortic or visceral organ injury.  4.  Mild diffuse bladder wall thickening which may be secondary to   underdistention. Correlate with urinalysis to exclude infection.  5.  Enlarged prostate.    < end of copied text >
HPI:  Patient is a 88y Male who presented s/p fall with pneumothorax, in ICU with chest tube, found to be in urinary retention, brunner placed for 900cc.  Also with gross hematuria.  Patient has dementia and cannot give history but according to daughter in law she is unaware of any prostate issues in the past or any history of hematuria.        PAST MEDICAL & SURGICAL HISTORY:  HTN (hypertension)  Skin cancer  Parotid neoplasm  Carotid artery disease  Dementia  Glaucoma  Hypothyroid  CAD (coronary artery disease)  Artificial cardiac pacemaker  H/O carotid endarterectomy: right  S/P hernia repair  S/P knee replacement: B/L  H/O shoulder surgery: left bypass  Stented coronary artery    MEDICATIONS  (STANDING):  lactated ringers. 1000 milliLiter(s) (75 mL/Hr) IV Continuous <Continuous>  acetaminophen  IVPB. 1000 milliGRAM(s) IV Intermittent once  acetaminophen  IVPB. 1000 milliGRAM(s) IV Intermittent once  enoxaparin Injectable 40 milliGRAM(s) SubCutaneous every 24 hours  citalopram 10 milliGRAM(s) Oral daily  donepezil 10 milliGRAM(s) Oral at bedtime  latanoprost 0.005% Ophthalmic Solution 1 Drop(s) Both EYES at bedtime  memantine 10 milliGRAM(s) Oral every 12 hours  levothyroxine 50 MICROGram(s) Oral daily  lidocaine   Patch 1 Patch Transdermal every 24 hours  docusate sodium 100 milliGRAM(s) Oral three times a day  senna 2 Tablet(s) Oral at bedtime  tamsulosin 0.4 milliGRAM(s) Oral at bedtime    MEDICATIONS  (PRN):  HYDROmorphone  Injectable 0.25 milliGRAM(s) IV Push every 3 hours PRN Severe Pain (7 - 10)  hydrALAZINE Injectable 10 milliGRAM(s) IV Push every 4 hours PRN Systolic greater than 180    FAMILY HISTORY:  Family history of heart disease (Father)    Allergies    No Known Allergies    Intolerances      SOCIAL HISTORY:   Tobacco hx: former smoker many years ago    REVIEW OF SYSTEMS: Pertinent positives and negatives as stated in HPI, otherwise negative    Vital signs  T(C): 36.6 (09-06-17 @ 11:00), Max: 37.2 (09-06-17 @ 07:00)  HR: 49 (09-06-17 @ 12:00)  BP: 97/52 (09-06-17 @ 12:00)  SpO2: 95% (09-06-17 @ 12:00)  Wt(kg): --    Output  I&O's Summary    05 Sep 2017 07:01  -  06 Sep 2017 07:00  --------------------------------------------------------  IN: 1808.8 mL / OUT: 970 mL / NET: 838.8 mL    06 Sep 2017 07:01  -  06 Sep 2017 12:36  --------------------------------------------------------  IN: 408 mL / OUT: 640 mL / NET: -232 mL      UOP    Physical Exam  Gen: NAD  Pulm: No respiratory distress, no subcostal retractions  CV: RRR, no JVD  Abd: Soft, NT, ND  : Circumcised, no lesions.  No discharge or blood at urethral meatus.  Testes descended bilaterally.  Testes and epididymis nontender bilaterally.  Irrigated brunner but remained bloody so removed brunner catheter. Placed 6 eye catheter and irrigated some small clots, then placed a 22F 3-way brunner catheter and started CBI. Urine clear on CBI.   Prostate smooth nontender, mildly enlarged.  MSK: No edema present    LABS:          09-06 @ 05:46    WBC 7.6   / Hct 35.2  / SCr 0.87     09-05 @ 11:59    WBC 10.7  / Hct 42.5  / SCr 1.17     09-06    136  |  100  |  19  ----------------------------<  138<H>  4.1   |  25  |  0.87    Ca    9.1      06 Sep 2017 05:46  Phos  2.6     09-06  Mg     1.9     09-06    TPro  7.5  /  Alb  4.2  /  TBili  0.6  /  DBili  x   /  AST  39  /  ALT  28  /  AlkPhos  132<H>  09-05    PT/INR - ( 05 Sep 2017 11:59 )   PT: 11.2 sec;   INR: 1.04 ratio         PTT - ( 05 Sep 2017 11:59 )  PTT:32.1 sec        RADIOLOGY:  < from: CT Abdomen and Pelvis w/ IV Cont (09.05.17 @ 14:01) >  IMPRESSION:     1.  Acute mildly displaced  fractures of the right fifth through eighth   ribs.  2.  Interval placement of a right-sided pigtail catheter with reexpansion   of the right lung with a residual trace pneumothorax.  3.  No evidence of aortic or visceral organ injury.  4.  Mild diffuse bladder wall thickening which may be secondary to   underdistention. Correlate with urinalysis to exclude infection.  5.  Enlarged prostate.    < end of copied text >
87 y/o M w/ hx of HTN HLD CAD s/p PPM, dementia aaox 1-2 at baseline, glaucoma, hypothyroidism and parotid neoplasm who presented s/p fall in the bathtub.  pt unable to provide hx as pt demented.  Pt daughter in law bedside  Pt was taking shower yesterday and was found in tub.  pt at that time c/o severe 10/10 flank pain worse with movement no alleviating factors. unable to illict further hx 2/2 to clinical condition.           Admission CXR with large R pneumothorax  CT CHest post-expansion: mildly displaced R 5th-8th rib fractures; ground glass RUL c/w re-expansion pulm edema  PAST MEDICAL & SURGICAL HISTORY:  HTN (hypertension)  Skin cancer  Parotid neoplasm  Carotid artery disease  Dementia  Glaucoma  Hypothyroid  CAD (coronary artery disease)  Artificial cardiac pacemaker  H/O carotid endarterectomy: right  S/P hernia repair  S/P knee replacement: B/L  H/O shoulder surgery: left bypass  Stented coronary artery    Allergies    No Known Allergies    Intolerances      FAMILY HISTORY:  Family history of heart disease (Father)    REVIEW OF SYSTEMS      General:	    Skin/Breast:  	  Ophthalmologic:  	  ENMT:	    Respiratory and Thorax:  	  Cardiovascular:	    Gastrointestinal:	    Genitourinary:	    Musculoskeletal:	    Neurological:	    Psychiatric:	    Hematology/Lymphatics:	    Endocrine:	    Allergic/Immunologic:	  Social history:       Medications:  MEDICATIONS  (STANDING):  lactated ringers. 1000 milliLiter(s) (75 mL/Hr) IV Continuous <Continuous>  acetaminophen  IVPB. 1000 milliGRAM(s) IV Intermittent once  acetaminophen  IVPB. 1000 milliGRAM(s) IV Intermittent once  enoxaparin Injectable 40 milliGRAM(s) SubCutaneous every 24 hours  citalopram 10 milliGRAM(s) Oral daily  donepezil 10 milliGRAM(s) Oral at bedtime  latanoprost 0.005% Ophthalmic Solution 1 Drop(s) Both EYES at bedtime  memantine 10 milliGRAM(s) Oral every 12 hours  levothyroxine 50 MICROGram(s) Oral daily  lidocaine   Patch 1 Patch Transdermal every 24 hours  docusate sodium 100 milliGRAM(s) Oral three times a day  senna 2 Tablet(s) Oral at bedtime  tamsulosin 0.4 milliGRAM(s) Oral at bedtime    MEDICATIONS  (PRN):  HYDROmorphone  Injectable 0.25 milliGRAM(s) IV Push every 3 hours PRN Severe Pain (7 - 10)  hydrALAZINE Injectable 10 milliGRAM(s) IV Push every 4 hours PRN Systolic greater than 180    Vital Signs Last 24 Hrs  T(C): 36.6 (06 Sep 2017 11:00), Max: 37.2 (06 Sep 2017 07:00)  T(F): 97.9 (06 Sep 2017 11:00), Max: 98.9 (06 Sep 2017 07:00)  HR: 49 (06 Sep 2017 12:00) (49 - 54)  BP: 97/52 (06 Sep 2017 12:00) (91/52 - 205/86)  BP(mean): 72 (06 Sep 2017 12:00) (69 - 137)  RR: 17 (06 Sep 2017 12:00) (14 - 34)  SpO2: 95% (06 Sep 2017 12:00) (91% - 98%)    ABG - ( 06 Sep 2017 05:43 )  pH: 7.42  /  pCO2: 40    /  pO2: 69    / HCO3: 26    / Base Excess: 1.6   /  SaO2: 95                    09-05 @ 07:01  -  09-06 @ 07:00  --------------------------------------------------------  IN: 1808.8 mL / OUT: 970 mL / NET: 838.8 mL      LABS:                        12.1   7.6   )-----------( 163      ( 06 Sep 2017 05:46 )             35.2     09-06    136  |  100  |  19  ----------------------------<  138<H>  4.1   |  25  |  0.87    Ca    9.1      06 Sep 2017 05:46  Phos  2.6     09-06  Mg     1.9     09-06    TPro  7.5  /  Alb  4.2  /  TBili  0.6  /  DBili  x   /  AST  39  /  ALT  28  /  AlkPhos  132<H>  09-05      PT/INR - ( 05 Sep 2017 11:59 )   PT: 11.2 sec;   INR: 1.04 ratio      PTT - ( 05 Sep 2017 11:59 )  PTT:32.1 sec    CULTURES:  Physical Examination:    GENERAL: obtunded confused  HEAD:  Atraumatic, Normocephalic  EYES: PERRLA, conjunctiva and sclera clear  NECK: Supple, no masses  CHEST/LUNG: decrease breath sounds; No wheeze  HEART: Regular rate and rhythm; No r gallops  ABDOMEN: Soft, Nontender, Nondistended; Bowel sounds present  EXTREMITIES:  + Peripheral Pulses, No clubbing, cyanosis,   SKIN: No rashes or lesions    CT chest: see above    TTE: NA
Chart reviewed and patient seen by undersigned    Asked to consult for agitation, sundowning    Historians include chart, wife,  and Dr. Hayes    History of Present Illness  The patient is a 88 year old WM with history of dementia and anxiety. He was admitted here on 9/6/17 for fall with resultant rib fractures and pneumothorax. Pt's RN tells me he was agitated today including trying to climb out of bed over the siderails and cursing. Pain has been well controlled. Wife says he was diagnosed with Alzheimer's dementia a few years ago. He was very sleepy for the past month so MD gave Ritalin 5mg daily one month ago for lethargy with good effect. He had recently wandered out of the house and police brought him back home.     Past Psychiatric History  Anxiety. He took Klonopin for years but it was discontinued 3 years ago and in it's place he was given Celexa 10mg/day for anxiety. On Aricept PTA and wife has not been giving Namenda feeling it will not help. No violence/suicide attempts.     Psychosocial History  Lives with wife and son. 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 Drinks nonalcoholic wine and about once a week one glass of wine. No history of alcohol or illicit drug abuse.     PAST MEDICAL & SURGICAL HISTORY:  HTN (hypertension)  Skin cancer  Parotid neoplasm  Carotid artery disease  Dementia  Glaucoma  Hypothyroid  CAD (coronary artery disease)  Artificial cardiac pacemaker  H/O carotid endarterectomy: right  S/P hernia repair  S/P knee replacement: B/L  H/O shoulder surgery: left bypass  Stented coronary artery      FAMILY HISTORY:  Family history of heart disease (Father)      Vital Signs Last 24 Hrs  T(C): 36.8 (11 Sep 2017 14:00), Max: 37 (10 Sep 2017 19:58)  T(F): 98.2 (11 Sep 2017 14:00), Max: 98.6 (10 Sep 2017 19:58)  HR: 70 (11 Sep 2017 14:00) (69 - 71)  BP: 148/77 (11 Sep 2017 14:00) (148/77 - 156/69)  BP(mean): --  RR: 18 (11 Sep 2017 14:00) (18 - 20)  SpO2: 95% (11 Sep 2017 14:00) (95% - 95%)                      MEDICATIONS  (STANDING):  enoxaparin Injectable 40 milliGRAM(s) SubCutaneous every 24 hours  citalopram 10 milliGRAM(s) Oral daily  donepezil 10 milliGRAM(s) Oral at bedtime  latanoprost 0.005% Ophthalmic Solution 1 Drop(s) Both EYES at bedtime  memantine 10 milliGRAM(s) Oral every 12 hours  levothyroxine 50 MICROGram(s) Oral daily  lidocaine   Patch 1 Patch Transdermal every 24 hours  docusate sodium 100 milliGRAM(s) Oral three times a day  senna 2 Tablet(s) Oral at bedtime  tamsulosin 0.4 milliGRAM(s) Oral at bedtime  ALBUTerol/ipratropium for Nebulization 3 milliLiter(s) Nebulizer three times a day    MEDICATIONS  (PRN):  hydrALAZINE Injectable 10 milliGRAM(s) IV Push every 4 hours PRN Systolic greater than 180  acetaminophen   Tablet. 325 milliGRAM(s) Oral every 4 hours PRN Mild Pain (1 - 3)      Elderly WM in bed, calm, cooperative, lethargic. He is oriented x 1 (says he is now in a "drinking room" and it is "June" "2021"  .  No psychomotor abnormalities. Insight and judgment are poor. Speech is minimal with periods of aphasia. No hallucinations nor delusions. The patient denied suicidal and homicidal ideation and plan. Mood is euthymic and affect constricted/flat. Poor  Attention and concentration, short term memory, and long term memory. He cannot name the current president of the [place].      Suicidal risk assessment  Risk factors include cognitive impairment and being an elderly WM  Protective factors include no plan, no past attempts, no substance abuse, good social supports
HPI:  88m with h/o dementia, b/l TKR, s/p PPM, "neck cancer," initially admitted for fractures and PTX 2/2 trauma, course c/b duodenal ulcers. Now stable for discharge. Dermatology consulted for rash noted by RN on 9/19; family states it was not present prior to admission. Per family and PCA, does not appear pruritic. New medications include zyprexa 9/18 and seroquel 9/17.     PAST MEDICAL & SURGICAL HISTORY:  HTN (hypertension)  Skin cancer  Parotid neoplasm  Carotid artery disease  Dementia  Glaucoma  Hypothyroid  CAD (coronary artery disease)  Artificial cardiac pacemaker  H/O carotid endarterectomy: right  S/P hernia repair  S/P knee replacement: B/L  H/O shoulder surgery: left bypass  Stented coronary artery    REVIEW OF SYSTEMS  General: no fevers/chills, no lethargy	  Skin/Breast: see HPI  Ophthalmologic: no eye pain or change in vision  ENT: no dysphagia or change in hearing  Respiratory and Thorax: no SOB or cough  Cardiovascular: no palpitations or chest pain  Gastrointestinal: no abdominal pain or blood in stool   Genitourinary: no dysuria or frequency  Musculoskeletal: no joint pains or weakness	  Neurological: no weakness, numbness , or tingling    MEDICATIONS  (STANDING):  latanoprost 0.005% Ophthalmic Solution 1 Drop(s) Both EYES at bedtime  levothyroxine 50 MICROGram(s) Oral daily  lidocaine   Patch 1 Patch Transdermal every 24 hours  tamsulosin 0.4 milliGRAM(s) Oral at bedtime  ALBUTerol/ipratropium for Nebulization 3 milliLiter(s) Nebulizer three times a day  thiamine 100 milliGRAM(s) Oral daily  memantine 10 milliGRAM(s) Oral every 12 hours  citalopram 10 milliGRAM(s) Oral at bedtime  enoxaparin Injectable 40 milliGRAM(s) SubCutaneous every 24 hours  dextrose 5%. 1000 milliLiter(s) (50 mL/Hr) IV Continuous <Continuous>  metoprolol 25 milliGRAM(s) Oral two times a day  pantoprazole   Suspension 40 milliGRAM(s) Oral two times a day before meals    MEDICATIONS  (PRN):  hydrALAZINE Injectable 10 milliGRAM(s) IV Push every 4 hours PRN Systolic greater than 180  acetaminophen   Tablet. 325 milliGRAM(s) Oral every 4 hours PRN Mild Pain (1 - 3)    Allergies: NKDA    SOCIAL HISTORY: Lives at home with wife, son and daughter-in-law. Former smoker.    FAMILY HISTORY:  Family history of heart disease (Father)    Vital Signs Last 24 Hrs  T(C): 36.5 (20 Sep 2017 13:55), Max: 37.5 (20 Sep 2017 06:00)  T(F): 97.7 (20 Sep 2017 13:55), Max: 99.5 (20 Sep 2017 06:00)  HR: 67 (20 Sep 2017 13:55) (60 - 73)  BP: 146/64 (20 Sep 2017 13:55) (114/81 - 215/154)  BP(mean): 86 (19 Sep 2017 16:14) (86 - 179)  RR: 18 (20 Sep 2017 13:55) (16 - 28)  SpO2: 95% (20 Sep 2017 13:55) (94% - 99%)  PHYSICAL EXAM:     The patient was alert, well nourished, and in no apparent distress.  OP showed no ulcerations  There was no visible lymphadenopathy.  Conjunctiva were non injected  There was no clubbing or edema of extremities.  The scalp, hair, face, eyebrows, lips, OP, neck, chest, back, extremities X 4, nails were examined.  There was no hyperhidrosis or bromhidrosis.    Of note on skin exam:   erythematous pink papules scattered over chest, abdomen, back and anterior legs    LABS:                        10.1   8.7   )-----------( 202      ( 20 Sep 2017 13:24 )             28.9     09-20    141  |  106  |  13  ----------------------------<  102<H>  3.5   |  24  |  0.99    Ca    8.7      20 Sep 2017 13:24  Phos  2.9     09-20  Mg     1.9     09-20      PT/INR - ( 19 Sep 2017 03:13 )   PT: 13.7 sec;   INR: 1.25 ratio         PTT - ( 19 Sep 2017 03:13 )  PTT:28.5 sec
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  Pt was taking a shower yesterday at 5pm and was found down - water was off and pt started shower at 430pm.  Son helped him up when he fell again onto his rear.  He was complaining of R sided chest pain and his family gave him aspirin and advil and ice packs.  He was still having pain this am so they brought him to an urgent care center where they did a CXR showing rib fractures and ptx and was sent to ED.    In ED, pt was breathing comfortably on room air, pleasantly demented.  Wife, son and daughter in law at bedside. (05 Sep 2017 13:34)    Admission CXR with large R pneumothorax  CT CHest post-expansion: mildly displaced R 5th-8th rib fractures; ground glass RUL c/w re-expansion pulm edema  PAST MEDICAL & SURGICAL HISTORY:  HTN (hypertension)  Skin cancer  Parotid neoplasm  Carotid artery disease  Dementia  Glaucoma  Hypothyroid  CAD (coronary artery disease)  Artificial cardiac pacemaker  H/O carotid endarterectomy: right  S/P hernia repair  S/P knee replacement: B/L  H/O shoulder surgery: left bypass  Stented coronary artery    Allergies    No Known Allergies    Intolerances      FAMILY HISTORY:  Family history of heart disease (Father)    REVIEW OF SYSTEMS      General:	    Skin/Breast:  	  Ophthalmologic:  	  ENMT:	    Respiratory and Thorax:  	  Cardiovascular:	    Gastrointestinal:	    Genitourinary:	    Musculoskeletal:	    Neurological:	    Psychiatric:	    Hematology/Lymphatics:	    Endocrine:	    Allergic/Immunologic:	  Social history:       Medications:  MEDICATIONS  (STANDING):  lactated ringers. 1000 milliLiter(s) (75 mL/Hr) IV Continuous <Continuous>  acetaminophen  IVPB. 1000 milliGRAM(s) IV Intermittent once  acetaminophen  IVPB. 1000 milliGRAM(s) IV Intermittent once  enoxaparin Injectable 40 milliGRAM(s) SubCutaneous every 24 hours  citalopram 10 milliGRAM(s) Oral daily  donepezil 10 milliGRAM(s) Oral at bedtime  latanoprost 0.005% Ophthalmic Solution 1 Drop(s) Both EYES at bedtime  memantine 10 milliGRAM(s) Oral every 12 hours  levothyroxine 50 MICROGram(s) Oral daily  lidocaine   Patch 1 Patch Transdermal every 24 hours  docusate sodium 100 milliGRAM(s) Oral three times a day  senna 2 Tablet(s) Oral at bedtime  tamsulosin 0.4 milliGRAM(s) Oral at bedtime    MEDICATIONS  (PRN):  HYDROmorphone  Injectable 0.25 milliGRAM(s) IV Push every 3 hours PRN Severe Pain (7 - 10)  hydrALAZINE Injectable 10 milliGRAM(s) IV Push every 4 hours PRN Systolic greater than 180    Vital Signs Last 24 Hrs  T(C): 36.6 (06 Sep 2017 11:00), Max: 37.2 (06 Sep 2017 07:00)  T(F): 97.9 (06 Sep 2017 11:00), Max: 98.9 (06 Sep 2017 07:00)  HR: 49 (06 Sep 2017 12:00) (49 - 54)  BP: 97/52 (06 Sep 2017 12:00) (91/52 - 205/86)  BP(mean): 72 (06 Sep 2017 12:00) (69 - 137)  RR: 17 (06 Sep 2017 12:00) (14 - 34)  SpO2: 95% (06 Sep 2017 12:00) (91% - 98%)    ABG - ( 06 Sep 2017 05:43 )  pH: 7.42  /  pCO2: 40    /  pO2: 69    / HCO3: 26    / Base Excess: 1.6   /  SaO2: 95                    09-05 @ 07:01  -  09-06 @ 07:00  --------------------------------------------------------  IN: 1808.8 mL / OUT: 970 mL / NET: 838.8 mL      LABS:                        12.1   7.6   )-----------( 163      ( 06 Sep 2017 05:46 )             35.2     09-06    136  |  100  |  19  ----------------------------<  138<H>  4.1   |  25  |  0.87    Ca    9.1      06 Sep 2017 05:46  Phos  2.6     09-06  Mg     1.9     09-06    TPro  7.5  /  Alb  4.2  /  TBili  0.6  /  DBili  x   /  AST  39  /  ALT  28  /  AlkPhos  132<H>  09-05      PT/INR - ( 05 Sep 2017 11:59 )   PT: 11.2 sec;   INR: 1.04 ratio         PTT - ( 05 Sep 2017 11:59 )  PTT:32.1 sec    CULTURES:  Physical Examination:    General: Sleeping/rousable: NAD    HEENT: Pupils equal, reactive to light.  Symmetric.    PULM: Bilateral BS with good aeration and few scattered crackles    CVS: Regular rate and rhythm, no murmurs, rubs, or gallops    ABD: Soft, nondistended, nontender, normoactive bowel sounds, no masses    EXT: No edema, nontender    SKIN: Warm and well perfused, no rashes noted.    NEURO: Alert, oriented, interactive, nonfocal    RADIOLOGY REVIEWED PERSONALLY  CXR:    CT chest: see above    TTE: NA
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  Pt was taking a shower yesterday at 5pm and was found down - water was off and pt started shower at 430pm.  Son helped him up when he fell again onto his rear.  He was complaining of R sided chest pain and his family gave him aspirin and advil and ice packs.  He was still having pain this am so they brought him to an urgent care center where they did a CXR showing rib fractures and ptx and was sent to ED.  Patient has hx of dementia HBP CAD S/p pacemaker  cath 2015 patent stents  Presently mildy hypertensive    PAST MEDICAL & SURGICAL HISTORY:  HTN (hypertension)  Skin cancer  Parotid neoplasm  Carotid artery disease  Dementia  Glaucoma  Hypothyroid  CAD (coronary artery disease)  Artificial cardiac pacemaker  H/O carotid endarterectomy: right  S/P hernia repair  S/P knee replacement: B/L  H/O shoulder surgery: left bypass  Stented coronary artery          MEDICATIONS:  enoxaparin Injectable 40 milliGRAM(s) SubCutaneous every 24 hours  tamsulosin 0.4 milliGRAM(s) Oral at bedtime  hydrALAZINE Injectable 10 milliGRAM(s) IV Push every 4 hours PRN        citalopram 10 milliGRAM(s) Oral daily  donepezil 10 milliGRAM(s) Oral at bedtime  memantine 10 milliGRAM(s) Oral every 12 hours  QUEtiapine 25 milliGRAM(s) Oral at bedtime PRN    docusate sodium 100 milliGRAM(s) Oral three times a day  senna 2 Tablet(s) Oral at bedtime    levothyroxine 50 MICROGram(s) Oral daily    latanoprost 0.005% Ophthalmic Solution 1 Drop(s) Both EYES at bedtime  lidocaine   Patch 1 Patch Transdermal every 24 hours  lidocaine 2% Gel 1 Application(s) Topical three times a day      FAMILY HISTORY:  Family history of heart disease (Father)      SOCIAL HISTORY:    [ ] Non-smoker  [ ] Smoker  [ ] Alcohol    Allergies    No Known Allergies    Intolerances    	      PHYSICAL EXAM:  T(C): 37.1 (09-08-17 @ 15:49), Max: 37.1 (09-07-17 @ 23:00)  HR: 77 (09-08-17 @ 15:49) (60 - 99)  BP: 159/62 (09-08-17 @ 15:49) (118/57 - 173/75)  RR: 20 (09-08-17 @ 15:49) (19 - 39)  SpO2: 94% (09-08-17 @ 15:49) (92% - 99%)  Wt(kg): --  I&O's Summary    07 Sep 2017 07:01  -  08 Sep 2017 07:00  --------------------------------------------------------  IN: 770 mL / OUT: 920 mL / NET: -150 mL    08 Sep 2017 07:01  -  08 Sep 2017 15:58  --------------------------------------------------------  IN: 900 mL / OUT: 275 mL / NET: 625 mL        Appearance: Normal  confused disoriented coughing		  Cardiovascular: Normal S1 S2, No JVD, No murmurs  Respiratory:  inspiratory rhonchi	  Psychiatry: A & O x 3, Mood & affect appropriate  Gastrointestinal:  Soft, Non-tender, + BS		  Extremities: No clubbing, cyanosis or edema  Vascular: Peripheral pulses palpable 2+ bilaterally    TELEMETRY: 	    ECG:   < from: 12 Lead ECG (09.06.17 @ 01:11) >  entricular Rate 49 BPM    Atrial Rate 49 BPM    P-R Interval 114 ms    QRS Duration 144 ms     ms    QTc 487 ms    R Axis -66 degrees    T Axis 92 degrees    Diagnosis Line AV SEQUENTIAL OR DUAL CHAMBER ELECTRONIC PACEMAKER    < end of copied text >   	    ECHO previous echo 2008 normal LV function  : 	  	  LABS:	 	    CARDIAC MARKERS:                                  11.6   10.9  )-----------( 204      ( 08 Sep 2017 03:33 )             33.9     09-08    139  |  100  |  21  ----------------------------<  130<H>  3.9   |  26  |  1.11    Ca    9.8      08 Sep 2017 03:33  Phos  2.5     09-08  Mg     2.2     09-08      proBNP:   Lipid Profile:   HgA1c:   TSH:

## 2017-09-20 NOTE — PROGRESS NOTE ADULT - PROVIDER SPECIALTY LIST ADULT
Gastroenterology
Internal Medicine
Psychiatry
Pulmonology
SICU
Trauma Surgery
Urology
Internal Medicine
SICU

## 2017-09-20 NOTE — PROGRESS NOTE ADULT - SUBJECTIVE AND OBJECTIVE BOX
Follow-up Pulm Progress Note  Stefan Vides MD  [phone]    Events Noted:  Afebrile and hemodynamically stable: off antibiotics  Remains confused, intermittent agitation/deliruim  Without tachypnea - stable resp status  Vital Signs Last 24 Hrs  T(C): 37 (20 Sep 2017 09:58), Max: 37.5 (20 Sep 2017 06:00)  T(F): 98.6 (20 Sep 2017 09:58), Max: 99.5 (20 Sep 2017 06:00)  HR: 73 (20 Sep 2017 09:58) (57 - 73)  BP: 153/67 (20 Sep 2017 09:58) (114/65 - 215/154)  BP(mean): 86 (19 Sep 2017 16:14) (81 - 179)  RR: 18 (20 Sep 2017 09:58) (16 - 37)  SpO2: 94% (20 Sep 2017 09:58) (94% - 99%)                         10.2   11.5  )-----------( 207      ( 19 Sep 2017 03:13 )             29.2       09-19    144  |  107  |  13  ----------------------------<  100<H>  3.9   |  22  |  0.97    Ca    9.1      19 Sep 2017 03:13  Phos  2.2     09-19  Mg     2.0     09-19        Physical Examination:  CONST: delirious, non-verbal  PULM: Clear without wheeze or rhonchi  CVS: Regular rate and rhythm, no murmurs, rubs, or gallops  ABD: Soft, non-tender  EXT:  No clubbing, cyanosis, or edema    RADIOLOGY REVIEWED  CXR:  CT chest:    TTE:

## 2017-09-20 NOTE — PROGRESS NOTE ADULT - ASSESSMENT
89 yo male s/p fall with complicated hospital stay with GI bleed, transferred to floor after stablized in SICU. Hemodynamically stable.     Plan:  -holding seroquel, bedtime haldol refused by family yesterday, holding psychotropic meds. Pain control. 1:1 observation  -continue beta-blocker and blood pressure control  -continue Dysphagia 3 diet, course of protonix gtt finished, continue BID dosing  -monitor Is and Os, condom catheter  -DVT ppx  Dispo: work with PT, discharge home with home aide once patient is able to walk and services have been arranged    Antwon Marie MD

## 2017-09-20 NOTE — CONSULT NOTE ADULT - ASSESSMENT
Dermatitis, likely 2/2 irritation. Clinical presentation is not concerning for drug reaction.    Recommend resumption of normal bathing when able as well as frequent emollients, e.g. Vaseline 1-2x a day.    Nay Romero MD  PGY2, Dermatology Dermatitis, perhaps irritant contact  Clinical presentation is not concerning for drug reaction.  Asymptomatic  Recommend frequent emollients, e.g. Vaseline 1-2x a day.    Nay Romero MD  PGY2, Dermatology

## 2017-09-20 NOTE — PROGRESS NOTE ADULT - SUBJECTIVE AND OBJECTIVE BOX
Patient is a 88y old  Male who presents with a chief complaint of unwitnessed fall, multiple rib fractures with ptx on xray (08 Sep 2017 11:31)      SUBJECTIVE / OVERNIGHT EVENTS:    pt resting in bed no events overnight.  pt confused and tired.  pt denies any cp abd pain n/v/d     Vital Signs Last 24 Hrs  T(C): 37 (20 Sep 2017 09:58), Max: 37.5 (20 Sep 2017 06:00)  T(F): 98.6 (20 Sep 2017 09:58), Max: 99.5 (20 Sep 2017 06:00)  HR: 73 (20 Sep 2017 09:58) (57 - 73)  BP: 153/67 (20 Sep 2017 09:58) (114/81 - 215/154)  BP(mean): 86 (19 Sep 2017 16:14) (81 - 179)  RR: 18 (20 Sep 2017 09:58) (16 - 34)  SpO2: 94% (20 Sep 2017 09:58) (94% - 99%)  I&O's Summary    19 Sep 2017 07:01  -  20 Sep 2017 07:00  --------------------------------------------------------  IN: 710 mL / OUT: 0 mL / NET: 710 mL    20 Sep 2017 07:01  -  20 Sep 2017 13:05  --------------------------------------------------------  IN: 245 mL / OUT: 0 mL / NET: 245 mL            LABS:                        10.2   11.5  )-----------( 207      ( 19 Sep 2017 03:13 )             29.2     09-19    144  |  107  |  13  ----------------------------<  100<H>  3.9   |  22  |  0.97    Ca    9.1      19 Sep 2017 03:13  Phos  2.2     09-19  Mg     2.0     09-19      PT/INR - ( 19 Sep 2017 03:13 )   PT: 13.7 sec;   INR: 1.25 ratio         PTT - ( 19 Sep 2017 03:13 )  PTT:28.5 sec  CAPILLARY BLOOD GLUCOSE                RADIOLOGY & ADDITIONAL TESTS:    Imaging Personally Reviewed:  [x] YES  [ ] NO    Consultant(s) Notes Reviewed:  [x] YES  [ ] NO      MEDICATIONS  (STANDING):  latanoprost 0.005% Ophthalmic Solution 1 Drop(s) Both EYES at bedtime  levothyroxine 50 MICROGram(s) Oral daily  lidocaine   Patch 1 Patch Transdermal every 24 hours  tamsulosin 0.4 milliGRAM(s) Oral at bedtime  ALBUTerol/ipratropium for Nebulization 3 milliLiter(s) Nebulizer three times a day  thiamine 100 milliGRAM(s) Oral daily  memantine 10 milliGRAM(s) Oral every 12 hours  citalopram 10 milliGRAM(s) Oral at bedtime  enoxaparin Injectable 40 milliGRAM(s) SubCutaneous every 24 hours  dextrose 5%. 1000 milliLiter(s) (50 mL/Hr) IV Continuous <Continuous>  metoprolol 25 milliGRAM(s) Oral two times a day  pantoprazole   Suspension 40 milliGRAM(s) Oral two times a day before meals  haloperidol    Injectable 2 milliGRAM(s) IV Push at bedtime    MEDICATIONS  (PRN):  hydrALAZINE Injectable 10 milliGRAM(s) IV Push every 4 hours PRN Systolic greater than 180  acetaminophen   Tablet. 325 milliGRAM(s) Oral every 4 hours PRN Mild Pain (1 - 3)  haloperidol    Injectable 1 milliGRAM(s) IV Push every 8 hours PRN Delerium      Care Discussed with Consultants/Other Providers [x] YES  [ ] NO    HEALTH ISSUES - PROBLEM Dx:  Hematuria: Hematuria

## 2017-09-20 NOTE — CHART NOTE - NSCHARTNOTEFT_GEN_A_CORE
Code blue was called at approximately 4:25 PM, team responded at bedside where RRT was in progress with medicine and SICU providers, code blue was cancelled as the patient was breathing. Reportedly the patient had a large melanotic bowel movement and became less responsive with BPs in the 80s/50s. Earlier in the day a drop in Hct was noted from 33.9% 1 week ago to 21.8-24.5% on multiple CBCs drawn this morning. Prior to the RRT the patient's vitals had been stable and his mental status remained consistent with his mental status during the entirety of the admission. He had never had a prior bowel movement with blood in it during the admission. The patient appeared pale, but radial pulses were palpable bilaterally. Fluids were started and 2 units of blood were ordered immediately. Blood was quickly transported to floor where it was administered via 2 20 gauge IVs. Labs were sent STAT. The level of care was escalated and the patient was brought up to the SICU for further care and resuscitation.
Discussion was had with Dr. Santillan of Psychiatry regarding patient's discharge. Dr. Santillan recommended prescribing 0.5 mg PO haldol three times a day PRN for agitation due to it's less sedating profile compared to the patient's previous home medication seroquel. He also said Mr. Goldberg may follow up with outpatient Psychiatry at [facility] for further management of his agitation/dementia. Prescription written for haldol and this information is included in the patient's discharge summary.    Antwon Marie MD
EVENT NOTE    Patient is a 88 year old male with a PMHx of CAD s/p stent and PPM, and dementia who presented on 9/5/2017 s/p mechanical fall. Patient was initially admitted to the SICU for respiratory monitoring as the patient had sustained right 4th-8th rib fractures and a large right pneumothorax s/p pigtail catheter placement. He was stabilized from a respiratory standpoint and the right pigtail catheter was d/lowell with no evidence of pneumothorax afterwards. He was then transferred to the floors on 9/8/2017. Called to bedside today at approx. 1630 for a code blue. Patient was found minimally responsive and had a very large bowel movement with blood clots. Additionally, patient was hypotensive with a systolic BP in the 80s. Stat labs were drawn and patient was given 2 L of LR and 2 units of PRBCs. His systolic blood pressure improved to the 140s and patient started becoming more responsive. Patient was subsequently transferred to the SICU for further care. In the SICU, a right radial arterial line was placed and NGT placed & gastric lavage was performed, which was clear with no evidence of blood. GI was consulted and recommended making the patient NPO, starting a Protonix gtt, and administered bowel prep for an endoscopy & colonoscopy tomorrow morning. Patient will be monitored in the SICU on the hemorrhage watch protocol.    PHYSICAL EXAMINATION  Neuro: awake, alert, oriented only to self, baseline per son  Resp: clear to auscultation bilaterally  CV: regular rate and rhythm, S1S2  GI: soft, nontender, nondistended  Extremities: all four extremities are warm and pink with 2+ pulses, able to move all extremities    LABS               6.1    20.3  )-----------( 357      ( 15 Sep 2017 16:42 )             18.0    147<H>  |  110<H>  |  53<H>  ----------------------------<  233<H>  4.2   |  18<L>  |  1.14    Ca    8.9    PT/INR - ( 15 Sep 2017 16:42 )   PT: 14.4 sec;   INR: 1.33 ratio    PTT - ( 15 Sep 2017 16:42 )  PTT:27.4 sec    ABG  pH: 7.51  /  pCO2: 24    /  pO2: 250   / HCO3: 19    / Base Excess: -3.3    /  SaO2: 100  Lactate 6.3    ASSESSMENT & PLAN    89 y/o male s/p fall with right 4th-8th rib fractures and resolved right pneumothorax now presenting with an acute GI bleed.    Neuro:  - IV acetaminophen as needed for pain.  - Monitor neuro status every 4 hours.    Resp:  - Monitor pulse oximeter.  - Oxygen as needed via nasal cannula for goal SpO2 greater than 92%.    CV:  - Monitor vital signs.  - Will hold all home anti-hypertensives.  - Trend lactate as per hemorrhage protocol (q1hr for 3 hours, then q4hrs if trending down or bleeding is no longer suspected).    GI:  - Strict NPO.  - Bowel prep as per GI in preparation for scope tomorrow.  - Protonix gtt.    Renal:  - Monitor I&Os.  - Monitor electrolytes and replete as necessary.  - LR at 100 mL/hr as patient will be NPO.    Heme:  - SCDs for mechanical VTE prophylaxis as patient is actively bleeding.  - Trend CBC as per hemorrhage protocol (q1hr for 3 hours, then q4hrs if trending down or bleeding is no longer suspected).  - Trend coags.    ID:  - Continue ceftriaxone for UTI.  - Monitor for clinical evidence of active infection.    Endo:  - Monitor blood glucose on BMP.    Purnima Cartwright PA-C
Mr. Goldberg is an 88 year old male with a past medical history of dementia, CAD, hypothyroidism, R vascular shoulder bypass who presented after a fall, sustaining multiple R rib fractures with a Right traumatic pneumothorax requiring chest tube to be placed. Pt's hospital course was complicated by UGI bleed. With patients prolonged hospital stay, patient is deconditioned requiring full assist to mobilize with the aid of 2 people. Pt also has dysphagia requiring a dysphagia diet and at risk of aspiration with head of bed elevation to 30-45 degrees when lying supine. Pt requires a hospital bed for the reasons mentioned.     Sisi Karimi PA-C  p) [phone]
Readmitted to SICU this afternoon for acute episode of melanotic stools with Hemorrhagic Shock    He has had no additional melena and BP has remained stable in SICU.  Gastric lavage had clear, non-bilious return. I suspect bleeding from duodenal ulcer.    GI consulted and initially planned for EGD/colonoscopy tomorrow morning.  I discussed this plan with his son and daughter-in-law at bedside. They are concerned with risks (especially that of worsening delirium) and discomfort of bowel prep for colonoscopy. We considered the significant possibility of not requiring colonoscopy based on EGD results. We decided to forego bowel prep tonight and plan for EGD tomorrow. I discussed this plan with Dr. Griffin (GI) by phone.    hgb - 6.1 -> 2u RBC transfusion, 8.9, 8.1  lactate - 2.1, 1.0, 0.8  base deficit - -1.0, +0.7, +1.1    I discussed his condition with Dr. Kenyon (ACS attending)    -has not had NSAIDs during his 10 day hospitalization for 4 right rib fractures with pneumothorax  -now on PPI drip  -there is no sign of persistent GI bleed or hemorrhagic shock      Critical Care Time - 45 minutes
Called to patient bedside, brunner not draining well, [id number].  When irrigated, leaked around brunner.  Deflated balloon, advanced catheter and instilled 15cc into balloon.  Now draining pink urine.  Observe and flush as needed.

## 2017-09-20 NOTE — PROGRESS NOTE ADULT - CVS HE PE MLT D E PC
no rub/regular rate and rhythm
no rub/regular rate and rhythm
regular rate and rhythm/no rub
no rub/regular rate and rhythm
no rub/regular rate and rhythm

## 2017-09-20 NOTE — PROGRESS NOTE ADULT - SUBJECTIVE AND OBJECTIVE BOX
ACS Progress Note    S: Patient seen and examined by trauma team this AM. No acute events overnight. Slept through most of the night. Has not gotten any psychotropic meds since coming to floor.     O:  Vital Signs Last 24 Hrs  T(C): 37.5 (20 Sep 2017 06:00), Max: 37.5 (20 Sep 2017 06:00)  T(F): 99.5 (20 Sep 2017 06:00), Max: 99.5 (20 Sep 2017 06:00)  HR: 67 (20 Sep 2017 06:00) (57 - 73)  BP: 115/55 (20 Sep 2017 06:00) (113/58 - 215/154)  BP(mean): 86 (19 Sep 2017 16:14) (81 - 179)  RR: 18 (20 Sep 2017 06:00) (16 - 37)  SpO2: 97% (20 Sep 2017 06:00) (94% - 99%)    I&O's Detail    19 Sep 2017 07:01  -  20 Sep 2017 07:00  --------------------------------------------------------  IN:    dextrose 5%.: 400 mL    Oral Fluid: 60 mL  Total IN: 460 mL    OUT:  Total OUT: 0 mL    Total NET: 460 mL    MEDICATIONS  (STANDING):  donepezil 10 milliGRAM(s) Oral at bedtime  latanoprost 0.005% Ophthalmic Solution 1 Drop(s) Both EYES at bedtime  levothyroxine 50 MICROGram(s) Oral daily  lidocaine   Patch 1 Patch Transdermal every 24 hours  tamsulosin 0.4 milliGRAM(s) Oral at bedtime  ALBUTerol/ipratropium for Nebulization 3 milliLiter(s) Nebulizer three times a day  thiamine 100 milliGRAM(s) Oral daily  memantine 10 milliGRAM(s) Oral every 12 hours  citalopram 10 milliGRAM(s) Oral at bedtime  enoxaparin Injectable 40 milliGRAM(s) SubCutaneous every 24 hours  dextrose 5%. 1000 milliLiter(s) (50 mL/Hr) IV Continuous <Continuous>  metoprolol 25 milliGRAM(s) Oral two times a day  pantoprazole   Suspension 40 milliGRAM(s) Oral two times a day before meals  haloperidol    Injectable 2 milliGRAM(s) IV Push at bedtime    MEDICATIONS  (PRN):  hydrALAZINE Injectable 10 milliGRAM(s) IV Push every 4 hours PRN Systolic greater than 180  acetaminophen   Tablet. 325 milliGRAM(s) Oral every 4 hours PRN Mild Pain (1 - 3)  haloperidol    Injectable 1 milliGRAM(s) IV Push every 8 hours PRN Delerium                      10.2   11.5  )-----------( 207      ( 19 Sep 2017 03:13 )             29.2     09-19    144  |  107  |  13  ----------------------------<  100<H>  3.9   |  22  |  0.97    Ca    9.1      19 Sep 2017 03:13  Phos  2.2     09-19  Mg     2.0     09-19    Physical Exam:  Gen: Laying in bed, NAD, sleeping  Resp: Unlabored breathing  Abd: soft, NT, ND    Lines:   IV: patent, in place.

## 2017-10-19 PROCEDURE — 74177 CT ABD & PELVIS W/CONTRAST: CPT

## 2017-10-19 PROCEDURE — 97162 PT EVAL MOD COMPLEX 30 MIN: CPT

## 2017-10-19 PROCEDURE — 82803 BLOOD GASES ANY COMBINATION: CPT

## 2017-10-19 PROCEDURE — 86850 RBC ANTIBODY SCREEN: CPT

## 2017-10-19 PROCEDURE — 85730 THROMBOPLASTIN TIME PARTIAL: CPT

## 2017-10-19 PROCEDURE — 80048 BASIC METABOLIC PNL TOTAL CA: CPT

## 2017-10-19 PROCEDURE — 85014 HEMATOCRIT: CPT

## 2017-10-19 PROCEDURE — 82435 ASSAY OF BLOOD CHLORIDE: CPT

## 2017-10-19 PROCEDURE — 70450 CT HEAD/BRAIN W/O DYE: CPT

## 2017-10-19 PROCEDURE — 97116 GAIT TRAINING THERAPY: CPT

## 2017-10-19 PROCEDURE — 84100 ASSAY OF PHOSPHORUS: CPT

## 2017-10-19 PROCEDURE — P9016: CPT

## 2017-10-19 PROCEDURE — 96375 TX/PRO/DX INJ NEW DRUG ADDON: CPT | Mod: XU

## 2017-10-19 PROCEDURE — 86900 BLOOD TYPING SEROLOGIC ABO: CPT

## 2017-10-19 PROCEDURE — 82330 ASSAY OF CALCIUM: CPT

## 2017-10-19 PROCEDURE — 84132 ASSAY OF SERUM POTASSIUM: CPT

## 2017-10-19 PROCEDURE — 80053 COMPREHEN METABOLIC PANEL: CPT

## 2017-10-19 PROCEDURE — 71260 CT THORAX DX C+: CPT

## 2017-10-19 PROCEDURE — 85610 PROTHROMBIN TIME: CPT

## 2017-10-19 PROCEDURE — 84295 ASSAY OF SERUM SODIUM: CPT

## 2017-10-19 PROCEDURE — 86923 COMPATIBILITY TEST ELECTRIC: CPT

## 2017-10-19 PROCEDURE — 99285 EMERGENCY DEPT VISIT HI MDM: CPT | Mod: 25

## 2017-10-19 PROCEDURE — 86901 BLOOD TYPING SEROLOGIC RH(D): CPT

## 2017-10-19 PROCEDURE — 83605 ASSAY OF LACTIC ACID: CPT

## 2017-10-19 PROCEDURE — 71045 X-RAY EXAM CHEST 1 VIEW: CPT

## 2017-10-19 PROCEDURE — 83735 ASSAY OF MAGNESIUM: CPT

## 2017-10-19 PROCEDURE — 94640 AIRWAY INHALATION TREATMENT: CPT

## 2017-10-19 PROCEDURE — 88305 TISSUE EXAM BY PATHOLOGIST: CPT

## 2017-10-19 PROCEDURE — 82947 ASSAY GLUCOSE BLOOD QUANT: CPT

## 2017-10-19 PROCEDURE — 88312 SPECIAL STAINS GROUP 1: CPT

## 2017-10-19 PROCEDURE — 85027 COMPLETE CBC AUTOMATED: CPT

## 2017-10-19 PROCEDURE — 81001 URINALYSIS AUTO W/SCOPE: CPT

## 2017-10-19 PROCEDURE — 87086 URINE CULTURE/COLONY COUNT: CPT

## 2017-10-19 PROCEDURE — 32556 INSERT CATH PLEURA W/O IMAGE: CPT

## 2017-10-19 PROCEDURE — 97530 THERAPEUTIC ACTIVITIES: CPT

## 2017-10-19 PROCEDURE — 72125 CT NECK SPINE W/O DYE: CPT

## 2017-10-19 PROCEDURE — 36430 TRANSFUSION BLD/BLD COMPNT: CPT

## 2017-10-19 PROCEDURE — 93005 ELECTROCARDIOGRAM TRACING: CPT

## 2017-10-19 PROCEDURE — 96374 THER/PROPH/DIAG INJ IV PUSH: CPT | Mod: XU

## 2018-08-13 ENCOUNTER — APPOINTMENT (OUTPATIENT)
Dept: ELECTROPHYSIOLOGY | Facility: CLINIC | Age: 83
End: 2018-08-13

## 2018-08-27 ENCOUNTER — APPOINTMENT (OUTPATIENT)
Dept: ELECTROPHYSIOLOGY | Facility: CLINIC | Age: 83
End: 2018-08-27

## 2018-09-27 ENCOUNTER — APPOINTMENT (OUTPATIENT)
Dept: ELECTROPHYSIOLOGY | Facility: CLINIC | Age: 83
End: 2018-09-27
Payer: MEDICARE

## 2018-09-27 VITALS
OXYGEN SATURATION: 99 % | HEART RATE: 59 BPM | SYSTOLIC BLOOD PRESSURE: 150 MMHG | BODY MASS INDEX: 26.61 KG/M2 | DIASTOLIC BLOOD PRESSURE: 79 MMHG | WEIGHT: 175 LBS

## 2018-09-27 PROCEDURE — 93280 PM DEVICE PROGR EVAL DUAL: CPT

## 2019-07-19 ENCOUNTER — RECORD ABSTRACTING (OUTPATIENT)
Age: 84
End: 2019-07-19

## 2019-07-19 DIAGNOSIS — Z87.39 PERSONAL HISTORY OF OTHER DISEASES OF THE MUSCULOSKELETAL SYSTEM AND CONNECTIVE TISSUE: ICD-10-CM

## 2019-07-19 DIAGNOSIS — Z87.01 PERSONAL HISTORY OF PNEUMONIA (RECURRENT): ICD-10-CM

## 2019-07-19 DIAGNOSIS — Z95.5 PRESENCE OF CORONARY ANGIOPLASTY IMPLANT AND GRAFT: ICD-10-CM

## 2019-07-19 DIAGNOSIS — R01.1 CARDIAC MURMUR, UNSPECIFIED: ICD-10-CM

## 2019-07-19 DIAGNOSIS — Z86.59 PERSONAL HISTORY OF OTHER MENTAL AND BEHAVIORAL DISORDERS: ICD-10-CM

## 2019-07-19 DIAGNOSIS — Z87.2 PERSONAL HISTORY OF DISEASES OF THE SKIN AND SUBCUTANEOUS TISSUE: ICD-10-CM

## 2019-11-01 ENCOUNTER — APPOINTMENT (OUTPATIENT)
Dept: NEUROLOGY | Facility: CLINIC | Age: 84
End: 2019-11-01
Payer: MEDICARE

## 2019-11-01 VITALS
SYSTOLIC BLOOD PRESSURE: 111 MMHG | WEIGHT: 185 LBS | HEART RATE: 59 BPM | HEIGHT: 67 IN | DIASTOLIC BLOOD PRESSURE: 68 MMHG | BODY MASS INDEX: 29.03 KG/M2

## 2019-11-01 DIAGNOSIS — Z86.39 PERSONAL HISTORY OF OTHER ENDOCRINE, NUTRITIONAL AND METABOLIC DISEASE: ICD-10-CM

## 2019-11-01 DIAGNOSIS — F41.8 OTHER SPECIFIED ANXIETY DISORDERS: ICD-10-CM

## 2019-11-01 DIAGNOSIS — Z80.9 FAMILY HISTORY OF MALIGNANT NEOPLASM, UNSPECIFIED: ICD-10-CM

## 2019-11-01 DIAGNOSIS — G30.9 ALZHEIMER'S DISEASE, UNSPECIFIED: ICD-10-CM

## 2019-11-01 DIAGNOSIS — G47.00 INSOMNIA, UNSPECIFIED: ICD-10-CM

## 2019-11-01 DIAGNOSIS — R53.83 OTHER FATIGUE: ICD-10-CM

## 2019-11-01 DIAGNOSIS — R06.83 SNORING: ICD-10-CM

## 2019-11-01 DIAGNOSIS — F02.80 ALZHEIMER'S DISEASE, UNSPECIFIED: ICD-10-CM

## 2019-11-01 PROCEDURE — 96116 NUBHVL XM PHYS/QHP 1ST HR: CPT | Mod: 59

## 2019-11-01 PROCEDURE — 99205 OFFICE O/P NEW HI 60 MIN: CPT | Mod: 25

## 2019-11-01 RX ORDER — LEVOTHYROXINE SODIUM 0.17 MG/1
TABLET ORAL
Refills: 0 | Status: ACTIVE | COMMUNITY

## 2019-11-01 RX ORDER — DOXEPIN HYDROCHLORIDE 75 MG/1
CAPSULE ORAL
Refills: 0 | Status: ACTIVE | COMMUNITY

## 2019-11-01 RX ORDER — FUROSEMIDE 80 MG/1
TABLET ORAL
Refills: 0 | Status: ACTIVE | COMMUNITY

## 2019-11-01 RX ORDER — MODAFINIL 200 MG/1
TABLET ORAL
Refills: 0 | Status: ACTIVE | COMMUNITY

## 2019-11-01 RX ORDER — MEMANTINE HYDROCHLORIDE 10 MG/1
10 TABLET ORAL TWICE DAILY
Qty: 60 | Refills: 5 | Status: ACTIVE | COMMUNITY

## 2019-11-01 RX ORDER — CITALOPRAM HYDROBROMIDE 10 MG/1
TABLET, FILM COATED ORAL
Refills: 0 | Status: ACTIVE | COMMUNITY

## 2019-11-01 NOTE — PHYSICAL EXAM
[FreeTextEntry1] : General appearance: The patient is awake and alert, in no acute distress.\par Eyes: PERRL, moist conjunctiva, no scleral icterus.\par ENT: Oropharynx clear of any exudate or lesions. Dentition unremarkable. No lesions on lips or gums.\par Neck: supple, trachea midline. \par Pulmonary: Normal respiratory effort, no audible wheezing.\par Cardiac: Regular rate and rhythm. \par Vascular: No peripheral edema.\par Musculoskeletal: Gait and strength as noted in "Neurologic Examination" below. No clubbing or cyanosis. Normal range of motion.\par Skin: Warm and dry. No rashes or lesions. No bruising.\par Neurologic: See separate "Neurologic Examination" below.\par Psychiatric: Mood, affect and orientation as noted below in "Extended Neurobehavioral Examination".\par \par \par \par NEUROLOGIC EXAMINATION\par \par Cranial Nerves: \par attends to both sides\par pupils equal round and reactive to light\par gazes b/l in conversation\par facial sensation intact symmetrically\par face symmetrical\par hearing very impaired in conversation, without hearing aids today\par palate raises symmetrically, head turning and shoulder shrug symmetric, tongue midline without deviation with protrusion.\par No dysarthria.\par moves limbs symmetrically\par Sensory exam: light touch was intact. \par Coordination: no tremor\par Gait: needs assistance to stand and walk. posture stopped. dec heel strike, wide based. more hesitation and entryway.\par seemed to have grasp reflex when i was assisting him walking\par \par \par \par Activities of Daily Living ([place]): independent vs. needs some help vs. unable/needs major assistance.      \par            --responses were the following: except where noted,     unable all                                      \par 1. Bathing/showering\par 2. Dressing\par 3. Toileting\par 4. Transferring\par 5. Continence\par 6. Feeding                                                                                                                                                           \par \par Instrumental Activities of Daily Living ([place]): independent vs. needs some help vs. unable/needs major assistance.     \par            --responses were the following: except where noted, unable all\par 1. Ability to use telephone\par 2. Shopping\par 3. Food preparation\par 4. Housekeeping\par 5. Laundry\par 6. Transportation (public/arranging rides/driving)\par 7. Responsibility for own medications\par 8. Ability to handle finances\par

## 2019-11-01 NOTE — HISTORY OF PRESENT ILLNESS
[FreeTextEntry1] : The patient is a 89 year old right handed man referred by PCP Dr. South\par \par Symptoms since approx 2010. Progressive.\par she says dx'ed with AD approx 2015. Got a scan in [place]. thinks it was a pet scan. no head imaging since 2015.  He had neuropsych testing early on, not since. Prescribed aricept then, not any more since GIB- see below. Had prior neurologist in [place], doesn’t know name, didn’t bring records.\par On namenda for the last year. \par On doxepine for sleep in the last yr or so. Same for modafinil for wakefulness, they don’t think it helped.\par \par Early sx included he would forget instructions rose with going places.\par Impaired memory that doesn’t cue. Quiet with wfd, gets mixed up- cant always understand what hes trying to express.\par Approx 2017 required help with ADLs. Cant brush his teeth.\par Similar timeframe 2 yrs needs full supervision.\par Aid Karina is with him 24 hrs 7d/week. She prevents him from wandering, happened 2-3 times.\par He reads. Walks. doesn’t like to leave the house. doenst like to do puzzles. Likes to watch sports when [employer] related, his former baseball team.\par \par He was admitted [facility] 9/5-9/20/17. He fell and had rib fx, PTX s/p pigtail catheter that helped. He developed agitation in the hospital and psychiatry saw him, recommended seroquel prn.  Those records note that AD was dx'ed a few yrs prior. His course was c/b melena from GIB, Hg went from 11.6 to 7.2, rbcs given. EGD showed nonbleeding duodenal ulcer. Aricept was discontinued due to the GIB. He also had dec Na, dec Phos. Beta blocker dc'ed for bradycardia. \par  A few mos prior to hospitalization it was noted that PCP gave ritalin that helped his sleepiness. He had wandered as well.\par She says he came out worse from that hospitalization than when he went in. \par \par Fell in [place] last yr, she didn’t want to bring him to the hospital because of the bad experience he had in the hospital 2017.\par \par mood/psych:\par Per record review in 2012 he was on klonopin qhs, dc'ed approx 2014 and given celexa for anxiety.\par She says he hallucinated only 2 times because he had run out of medication but they arent sure which. last time was 2 weeks ago when off med 3 days. they think it could have been the citalopram 10mg based on pill picture i showed them. no psychiatrist, PCP prescribes the medications.\par wife thinks he is much calmer than he used to be. if one would allow him he would stay in bed all day. sometimes up all night. aid says that he is much calmer than before- used to shout and curse for example with showering. not anymore. he responds well to her.\par \par sleep: snores loudly. never tested for sleep apnea. cranky in the morning. can doze off easily. she doesn’t think he would tolerate cpap.\par \par Parotid cancer affecting neck on both sides. Surgery, RT. No chemo. approx 2004 15 yrs ago, shes not sure. hasn’t recurred.\par Hernia surgery 2012. she doesn’t recall any dramatic change after that.\par He has CAD with stent. PPM replaced 5/3/16, no dramatic change after that. She thinks hes had since approx 2004. not mri compatible.\par \par theyre going to [place] 11/5/19, planning to return in june. they have  practice for when in [place] that spans multiple states.\par \par has a dog, hes affectionate with.\par \par wife says this evaluation was recommended since he hadnt seen neurologist in a while. she was wondering if any new medications available, which we discussed unfortunately not. this visit i rec he should have sleep study to eval for CARLOS but she didn’t think he would tolerate, i rec he d/w pcp stoppeing doxepin. i rec she get me the records of his prior workup.

## 2019-11-01 NOTE — PROCEDURE
[FreeTextEntry1] : EXTENDED NEUROBEHAVIORAL STATUS TESTING\par \par [This is a separate procedure note for the Neurobehavioral Status Examination that was performed during the encounter]. \par \par The patient was alert, well groomed, NAD. \par \par attention: He allowed his wife and aid to lead the discussion. He kept his head down and didn’t participate until directly asked a question. He seemed to doze off. \par \par mood/behavior/psych: He made good eye contact and smiled appropriately. He was euthymic and reactive. he referenced people being nice in a charming way at the beginning of the encounter. there was no agitation or restlessness. he was cooperative with putting on his coat and leaving at the end of the encounter. he didn’t try to leave the visit prematurely. \par \par language: His speech had impaired fluency- dec words per phrase and words per minute. I didn’t hear phonemic paraphasic errors but he made some semantic errors- men instead of women with reference to the people in the room with him. Prosody was decreased. There was some agrammatism in spont speech and when he read a phrase- said what are you kids names when the phrase was written with your. He was able to otherwise read decently. He needed to read for the commands because his hearing was markedly impaired even with shouting, better response to aid who had a deeper voice.\par writing- he was able to write his name in capital print but the R in [place] looked more like a 5. he could write numbers 0,1,2 properly.\par \par exec function: he was quite perseverative. after my question about his birthday he persistently replied to other questions with comments about his birthday.\par \par visuospatial function: his perseveration on prior task limited his cooperation with drawing tasks but eventually he was able to copy a [place]. couldn’t follow thru with task to draw a clock. \par \par recent memory: he didn’t know the date or year. couldn’t say who played world series this week.\par \par facial recognition: he referenced his wife as elo at one point but when i asked at another point who was sitting next to him, and further probed with whether it was his wife or mother, he said he didn’t know.\par \par autobiographical memory: said he has 6 kids- but they have 3. he couldn’t name his children. re: his age he said he was guessing but thought he was 50, was consistent because at another point he mentioned having been alive for 50 yrs.\par \par \par INTERPRETATION: \par \par I carefully reviewed the above results of neurobehavioral status testing. The cognitive domains are listed below with my interpretation of whether or not there is an impairment or if performance was within normal limits. \par It should be noted that this testing is distinct from standard neuropsychological testing, which compares the patient's raw scores on different validated batteries of tests to those of their age-matched peers. Therefore subtle deficits may not be apparent on this testing, or instead some incorrect responses may overestimate deficits.\par \par Cognitive domains:\par 	Attention: impaired\par 	Working memory: not specifically tested\par 	Executive function: impaired in as much as i could assess\par 	Language: intermediate fluency, some agrammatism and decreased prosody but not effortful. semantic paraphasic errors in conversation. spelling error in writing sample and agrammatic error in reading sample\par 	Memory: impaired autobiographical memory. impaired recent memory.\par 	Visuospatial function: able to do a simple figure copy of [place]\par 	Praxis: not formally tested\par 	Behavior/Mood: appropriate/euthymic\par 	Other comments: perseveration affected ability to assess more comprehensively. disoriented. unclear degree of impaired facial recognition- variable replies \par \par Please refer to the assessment section of this encounter that documents how to incorporate the interpretation of these results into the patient's diagnosis and plan of care.\par \par 31 min were taken to administer and interpret this extended neurobehavioral evaluation and prepare the report. \par \par Testing start time: 3p\par Testing stop time: 3:16p\par Report time: 15 min\par

## 2019-11-01 NOTE — ASSESSMENT
[FreeTextEntry1] : The patient is a 89 year old right handed man with progressive cognitive decline since approx 2010, initially affecting memory early on, now with impaired ADLs and IADLs. On neurobehavioral status testing he has global decline with impaired autobiographical memory, recent memory, intermediate fluency with some mild agrammatism and prosody decline and semantic paraphasic errors. Significant perseveration. Gait is falls risk with wide based, unsteady, apraxic quality. Grasp reflex.\par \par Counseled that he has dementia of advanced stage. She reports he was dx'ed with Alzheimer's disease in 2015 as the cause of dementia but didn’t bring any of these records, I rec she send them so I can review. His early symptoms would fit with AD, and the prominent memory loss now would fit with that as well. He has frontal features on cognitive and neurologic exam now but more likely that this reflects natural progression of the disease to frontal lobes, rather than implicating a more frontally predominant type of dementia. \par \par Counseled on natural history of AD, lack of curative drugs, FDA approved medications and their risks/benefits/expectations. He is off aricept because of significant GIB 2017 requiring transfusion, I agree with remaining off. He is on namenda without side effects reported.\par \par He has hx of anxiety and depression, mood is well regulated. Prior he was on klonopin for yrs but no longer. He takes doxepin which we discussed should be changed because can cause cognitive side effects. \par \par He has a hx of heavy etoh use for many yrs, but not longer, and that could have contributed to his dementia as well. He snores and has unrefreshing sleep, easily falling asleep during the day.  We discussed how he most likely has obstructive sleep apnea, which could be contributing to his dementia but especially to his fatigue.  Breath sounds clear and equal bilaterally.

## 2022-03-11 NOTE — ED ADULT NURSE NOTE - PAIN: PRESENCE, MLM
@15:00- PD cathflow drained after 2hr dwell. Difficulty aspirating but able to remove 8mL. @15:05- 500mL of 1.5% Dianeal instilled. 1000units of heparin also dwelling with dianeal fluid. Will attempt to drain 500mL ~15:25. Pt c/o consistent pain in upper abdomin (3 of 10)- denied SOB.    @1545-  Manual drain of 600mL effluent attained from 500mL 1.5% dianeal filled. Net UF of 100mL removed. Effluent clear and dark yellow. No fibrin noted. Pt to have antibiotics dwell for 6hrs per MD orders. Report given to ADY Moncada RN. denies pain/discomfort

## 2023-01-06 NOTE — ED PROVIDER NOTE - PSH
Artificial cardiac pacemaker    H/O carotid endarterectomy  right  H/O shoulder surgery  left bypass  S/P hernia repair    S/P knee replacement  B/L  Stented coronary artery no

## 2024-04-11 NOTE — PROVIDER CONTACT NOTE (OTHER) - DATE AND TIME:
07-Sep-2017 03:10
08-Sep-2017 20:56
Marily Medley,  ([id number]). Reviewed schedule and rescheduled pt's appt for 4/12 to 4:30 pm due to transportation and canceled 4/19 as she has no transportation on Fridays. She is flexible with appt times Mon-Thurs. Confirmed that her appts will be with Maurice and pt reports that she is fine with seeing 2 PTs.  
06-Sep-2017 07:15
08-Sep-2017 17:15
18-Sep-2017 19:30
